# Patient Record
Sex: FEMALE | Race: BLACK OR AFRICAN AMERICAN | NOT HISPANIC OR LATINO | URBAN - METROPOLITAN AREA
[De-identification: names, ages, dates, MRNs, and addresses within clinical notes are randomized per-mention and may not be internally consistent; named-entity substitution may affect disease eponyms.]

---

## 2021-08-03 ENCOUNTER — EMERGENCY (EMERGENCY)
Facility: HOSPITAL | Age: 66
LOS: 1 days | Discharge: HOME | End: 2021-08-03
Attending: EMERGENCY MEDICINE
Payer: MEDICARE

## 2021-08-03 VITALS
DIASTOLIC BLOOD PRESSURE: 97 MMHG | HEART RATE: 73 BPM | OXYGEN SATURATION: 98 % | TEMPERATURE: 99 F | WEIGHT: 195.11 LBS | RESPIRATION RATE: 16 BRPM | SYSTOLIC BLOOD PRESSURE: 187 MMHG | HEIGHT: 67 IN

## 2021-08-03 DIAGNOSIS — Z98.89 OTHER SPECIFIED POSTPROCEDURAL STATES: Chronic | ICD-10-CM

## 2021-08-03 DIAGNOSIS — Z98.49 CATARACT EXTRACTION STATUS, UNSPECIFIED EYE: Chronic | ICD-10-CM

## 2021-08-03 DIAGNOSIS — Z99.2 DEPENDENCE ON RENAL DIALYSIS: Chronic | ICD-10-CM

## 2021-08-03 DIAGNOSIS — Z90.49 ACQUIRED ABSENCE OF OTHER SPECIFIED PARTS OF DIGESTIVE TRACT: Chronic | ICD-10-CM

## 2021-08-03 DIAGNOSIS — Z95.1 PRESENCE OF AORTOCORONARY BYPASS GRAFT: Chronic | ICD-10-CM

## 2021-08-03 DIAGNOSIS — Z95.5 PRESENCE OF CORONARY ANGIOPLASTY IMPLANT AND GRAFT: Chronic | ICD-10-CM

## 2021-08-03 PROCEDURE — 72125 CT NECK SPINE W/O DYE: CPT | Mod: 26,MH

## 2021-08-03 PROCEDURE — 70450 CT HEAD/BRAIN W/O DYE: CPT | Mod: 26,MH

## 2021-08-03 PROCEDURE — 73560 X-RAY EXAM OF KNEE 1 OR 2: CPT | Mod: 26,LT

## 2021-08-03 PROCEDURE — 70486 CT MAXILLOFACIAL W/O DYE: CPT | Mod: 26,MA

## 2021-08-03 PROCEDURE — 72125 CT NECK SPINE W/O DYE: CPT

## 2021-08-03 PROCEDURE — 73560 X-RAY EXAM OF KNEE 1 OR 2: CPT

## 2021-08-03 PROCEDURE — 12002 RPR S/N/AX/GEN/TRNK2.6-7.5CM: CPT

## 2021-08-03 PROCEDURE — 99284 EMERGENCY DEPT VISIT MOD MDM: CPT

## 2021-08-03 PROCEDURE — 13121 CMPLX RPR S/A/L 2.6-7.5 CM: CPT

## 2021-08-03 PROCEDURE — 12032 INTMD RPR S/A/T/EXT 2.6-7.5: CPT

## 2021-08-03 PROCEDURE — 99285 EMERGENCY DEPT VISIT HI MDM: CPT | Mod: 25

## 2021-08-03 PROCEDURE — 96374 THER/PROPH/DIAG INJ IV PUSH: CPT | Mod: XU

## 2021-08-03 PROCEDURE — 70450 CT HEAD/BRAIN W/O DYE: CPT

## 2021-08-03 PROCEDURE — 70486 CT MAXILLOFACIAL W/O DYE: CPT | Mod: MA

## 2021-08-03 PROCEDURE — 73562 X-RAY EXAM OF KNEE 3: CPT

## 2021-08-03 RX ORDER — HYDRALAZINE HCL 50 MG
1 TABLET ORAL
Qty: 0 | Refills: 0 | DISCHARGE

## 2021-08-03 RX ORDER — LABETALOL HCL 100 MG
1 TABLET ORAL
Qty: 0 | Refills: 0 | DISCHARGE

## 2021-08-03 RX ORDER — CLOPIDOGREL BISULFATE 75 MG/1
1 TABLET, FILM COATED ORAL
Qty: 0 | Refills: 0 | DISCHARGE

## 2021-08-03 RX ORDER — MONTELUKAST 4 MG/1
1 TABLET, CHEWABLE ORAL
Qty: 0 | Refills: 0 | DISCHARGE

## 2021-08-03 RX ORDER — SIMVASTATIN 20 MG/1
1 TABLET, FILM COATED ORAL
Qty: 0 | Refills: 0 | DISCHARGE

## 2021-08-03 RX ORDER — MORPHINE SULFATE 50 MG/1
2 CAPSULE, EXTENDED RELEASE ORAL ONCE
Refills: 0 | Status: DISCONTINUED | OUTPATIENT
Start: 2021-08-03 | End: 2021-08-03

## 2021-08-03 RX ORDER — ASPIRIN/CALCIUM CARB/MAGNESIUM 324 MG
1 TABLET ORAL
Qty: 0 | Refills: 0 | DISCHARGE

## 2021-08-03 RX ORDER — FOLIC ACID 0.8 MG
1 TABLET ORAL
Qty: 0 | Refills: 0 | DISCHARGE

## 2021-08-03 RX ADMIN — MORPHINE SULFATE 2 MILLIGRAM(S): 50 CAPSULE, EXTENDED RELEASE ORAL at 18:42

## 2021-08-03 NOTE — ED PROVIDER NOTE - CLINICAL SUMMARY MEDICAL DECISION MAKING FREE TEXT BOX
h/o esrd on plavix and aspirn with head/nasal injury; ct of head, maxillofacial , xray of left knee and pain meds

## 2021-08-03 NOTE — ED ADULT NURSE NOTE - OBJECTIVE STATEMENT
pt was done paying light bill and fell walking back to car on the sidewalk 'it was an uneven sidewalk'

## 2021-08-03 NOTE — ED PROVIDER NOTE - NSFOLLOWUPINSTRUCTIONS_ED_ALL_ED_FT
tylenol for pain  neosporin ointment 3 times a day  for 7 days  suture removal in 8 - 10 days  head injury instructions

## 2021-08-03 NOTE — ED ADULT NURSE NOTE - PMH
Asthma    CAD (coronary artery disease)    CKD (chronic kidney disease) requiring chronic dialysis    CVA (cerebral vascular accident)  2008  Diabetic retinopathy associated with diabetes mellitus due to underlying condition    HLD (hyperlipidemia)    Hypertension    Type 2 diabetes mellitus

## 2021-08-03 NOTE — ED ADULT TRIAGE NOTE - CHIEF COMPLAINT QUOTE
Pt A&Ox4 states "I fell on the side walk and hit my face and knees."  BIBA c/o fall having deep abrasion to left knee and small lac to bridge of nose. Patient denies LOC, no nausea no vomiting, denies syncopal episode, pt take asa 81 and Plavix.

## 2021-08-03 NOTE — ED ADULT NURSE NOTE - PSH
Acute hemodialysis patient  nothing in the left arm awaiting AV fistula placement - temporary cath in right upper chest quadrant  S/P CABG (coronary artery bypass graft)    S/P cataract extraction  OS with retained lens fragment 7/15/15  S/P  section    S/P cholecystectomy    S/P coronary artery stent placement  (1)

## 2021-08-03 NOTE — ED PROVIDER NOTE - PATIENT PORTAL LINK FT
You can access the FollowMyHealth Patient Portal offered by [facility] by registering at the following website: [email or web address]. By joining [employer]’s FollowMyHealth portal, you will also be able to view your health information using other applications (apps) compatible with our system.

## 2021-08-03 NOTE — ED PROVIDER NOTE - PHYSICAL EXAMINATION
Alert, lucid, and in no apparent distress. Pt is normocephalic, atraumatic.  Pupils are equal, round , nose 1cm laceration with bleeding lips pink, moist mucous membranes, tongue midline. Neck supple.   Lungs clear to auscultation. Heart regular rate and rhythm, normal S1, S2,   Abdomen is soft, nontender, no pulsatile mass, no masses,  Non-focal sensory, 5 out of 5 motor strength, left knee   deep abrasion with bleeding;   from of kneeno dysmetria, fluent, goal directed speech. CN2 to 12 intact. Skin without rash, purpura, eccyhmosis  No submandibular adenopathy. Normal mentation, does not appear agitated

## 2021-08-03 NOTE — ED PROVIDER NOTE - CARE PLAN
Principal Discharge DX:	Injury of nose, initial encounter  Secondary Diagnosis:	Abrasion of left knee, initial encounter

## 2022-09-20 ENCOUNTER — NON-APPOINTMENT (OUTPATIENT)
Age: 67
End: 2022-09-20

## 2022-09-20 ENCOUNTER — APPOINTMENT (OUTPATIENT)
Dept: TRANSPLANT | Facility: CLINIC | Age: 67
End: 2022-09-20

## 2022-09-20 ENCOUNTER — APPOINTMENT (OUTPATIENT)
Dept: NEPHROLOGY | Facility: CLINIC | Age: 67
End: 2022-09-20

## 2022-09-20 VITALS
SYSTOLIC BLOOD PRESSURE: 119 MMHG | RESPIRATION RATE: 16 BRPM | WEIGHT: 185 LBS | DIASTOLIC BLOOD PRESSURE: 59 MMHG | TEMPERATURE: 97.6 F | HEIGHT: 67 IN | BODY MASS INDEX: 29.03 KG/M2

## 2022-09-20 PROCEDURE — 99072 ADDL SUPL MATRL&STAF TM PHE: CPT

## 2022-09-20 PROCEDURE — 99205 OFFICE O/P NEW HI 60 MIN: CPT

## 2022-09-20 PROCEDURE — 99204 OFFICE O/P NEW MOD 45 MIN: CPT

## 2022-09-20 NOTE — PHYSICAL EXAM
[Well Developed] : well developed [No Acute Distress] : no acute distress [Scars] : scars [In Left Arm] : fistula/graft in left arm [] : right dorsalis pedis non-palpable [Normal] : normal

## 2022-09-20 NOTE — REVIEW OF SYSTEMS
[Recent Weight Loss (___ Lbs)] : recent [unfilled] ~Ulb weight loss [Can Walk (___ Blocks)] : can walk [unfilled] blocks [SOB] : shortness of breath [Urine Output: ____] : Urine Output: [unfilled] [Negative] : Neurological

## 2022-09-20 NOTE — ASSESSMENT
[FreeTextEntry1] : marginal candidate:\par  dialysis for 7 years\par  long term diabetes\par history of CVA\par CAD post CABG and multiple stenting\par however she has a reasonable functional status\par will proceed with transplant workup\par
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
## 2022-09-21 NOTE — PLAN
[FreeTextEntry1] : 1. ESRD on dialysis 2015 - Ms  EULOGIO NIELSEN  is a marginal candidate for kidney transplantation. \par diabetes for over 35 years, CAD s/p CABG and multiple PCI  most recently in 2021, CVA in 2008 with some residual weakness, functional status is fair) \par 2. Cardiac risk: Obtain echo, stress test and cardiac evaluation \par 3. Cancer screening: colonoscopy results,  Mammo, Pap \par 4. ID: Serology for acute and chronic viral infections. Screening for latent TB\par 5. Imaging: Renal/abdominal /chest /Iliac imaging\par 6. Diabetes Mellitus: check HbA1c \par  \par \par I have personally discussed the risks and benefits of transplantation and patient attended transplant education class where the following was disclosed:\par Reviewed factors affecting survival and morbidity while on dialysis, the transplant wait list and reviewed mandie-operative and long-term risk factors affecting outcome in kidney transplantation. \par One year SRTR outcomes for national and [facility] were discussed in regards to patient survival and graft survival after transplantation. \par Details of transplant surgery, including complications were discussed.\par Immunosuppression and complications including infection including life threatening sepsis and opportunistic infections, malignancy and new onset diabetes were discussed. \par Benefits of live donor transplantation as well as variability in wait times across regions and multiple listing were discussed. \par KDPI >85% and PHS high risk criteria donors were discussed. \par HCV kidney transplantation was discussed.\par Will proceed with completing/ updating work up and listing for transplant/ live donor transplant once work up is reviewed and found to be acceptable by multidisciplinary listing committee.\par
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
## 2022-09-22 ENCOUNTER — NON-APPOINTMENT (OUTPATIENT)
Age: 67
End: 2022-09-22

## 2022-09-28 LAB
ABO + RH PNL BLD: NORMAL
ABO + RH PNL BLD: NORMAL
ALBUMIN SERPL ELPH-MCNC: 4.2 G/DL
ALP BLD-CCNC: 100 U/L
ALT SERPL-CCNC: 10 U/L
ANION GAP SERPL CALC-SCNC: 20 MMOL/L
AST SERPL-CCNC: 14 U/L
BASOPHILS # BLD AUTO: 0.05 K/UL
BASOPHILS NFR BLD AUTO: 0.6 %
BILIRUB SERPL-MCNC: 0.2 MG/DL
BUN SERPL-MCNC: 70 MG/DL
C PEPTIDE SERPL-MCNC: 9.2 NG/ML
CALCIUM SERPL-MCNC: 9.9 MG/DL
CHLORIDE SERPL-SCNC: 96 MMOL/L
CHOLEST SERPL-MCNC: 225 MG/DL
CMV IGG SERPL QL: >10 U/ML
CMV IGG SERPL-IMP: POSITIVE
CO2 SERPL-SCNC: 25 MMOL/L
COVID-19 SPIKE DOMAIN ANTIBODY INTERPRETATION: POSITIVE
CREAT SERPL-MCNC: 8.46 MG/DL
EBV DNA SERPL NAA+PROBE-ACNC: NOT DETECTED IU/ML
EBV EA AB SER IA-ACNC: 36.4 U/ML
EBV EA AB TITR SER IF: POSITIVE
EBV EA IGG SER QL IA: 133 U/ML
EBV EA IGG SER-ACNC: POSITIVE
EBV EA IGM SER IA-ACNC: NEGATIVE
EBV PATRN SPEC IB-IMP: NORMAL
EBV VCA IGG SER IA-ACNC: >750 U/ML
EBV VCA IGM SER QL IA: <10 U/ML
EBVPCR LOG: NOT DETECTED LOG10IU/ML
EGFR: 5 ML/MIN/1.73M2
EOSINOPHIL # BLD AUTO: 0.19 K/UL
EOSINOPHIL NFR BLD AUTO: 2.4 %
EPSTEIN-BARR VIRUS CAPSID ANTIGEN IGG: POSITIVE
ESTIMATED AVERAGE GLUCOSE: 157 MG/DL
GLUCOSE SERPL-MCNC: 110 MG/DL
HAV IGM SER QL: NONREACTIVE
HBA1C MFR BLD HPLC: 7.1 %
HBV CORE IGG+IGM SER QL: NONREACTIVE
HBV SURFACE AB SER QL: REACTIVE
HBV SURFACE AB SERPL IA-ACNC: 39.8 MIU/ML
HBV SURFACE AG SER QL: NONREACTIVE
HCT VFR BLD CALC: 41.7 %
HCV AB SER QL: NONREACTIVE
HCV S/CO RATIO: 0.12 S/CO
HDLC SERPL-MCNC: 49 MG/DL
HGB BLD-MCNC: 12.4 G/DL
HIV1+2 AB SPEC QL IA.RAPID: NONREACTIVE
HSV 1+2 IGG SER IA-IMP: NEGATIVE
HSV 1+2 IGG SER IA-IMP: POSITIVE
HSV1 IGG SER QL: 43.9 INDEX
HSV2 IGG SER QL: 0.06 INDEX
IMM GRANULOCYTES NFR BLD AUTO: 0.4 %
LDLC SERPL CALC-MCNC: 130 MG/DL
LYMPHOCYTES # BLD AUTO: 1.97 K/UL
LYMPHOCYTES NFR BLD AUTO: 24.9 %
M TB IFN-G BLD-IMP: NEGATIVE
MAGNESIUM SERPL-MCNC: 2.4 MG/DL
MAN DIFF?: NORMAL
MCHC RBC-ENTMCNC: 28.4 PG
MCHC RBC-ENTMCNC: 29.7 GM/DL
MCV RBC AUTO: 95.4 FL
MONOCYTES # BLD AUTO: 0.53 K/UL
MONOCYTES NFR BLD AUTO: 6.7 %
NEUTROPHILS # BLD AUTO: 5.13 K/UL
NEUTROPHILS NFR BLD AUTO: 65 %
NONHDLC SERPL-MCNC: 177 MG/DL
PHOSPHATE SERPL-MCNC: 6.6 MG/DL
PLATELET # BLD AUTO: 365 K/UL
POTASSIUM SERPL-SCNC: 5.1 MMOL/L
PROT SERPL-MCNC: 7.4 G/DL
PSA SERPL-MCNC: <0.01 NG/ML
QUANTIFERON TB PLUS MITOGEN MINUS NIL: 2.82 IU/ML
QUANTIFERON TB PLUS NIL: 0.02 IU/ML
QUANTIFERON TB PLUS TB1 MINUS NIL: 0 IU/ML
QUANTIFERON TB PLUS TB2 MINUS NIL: 0 IU/ML
RBC # BLD: 4.37 M/UL
RBC # FLD: 13.2 %
RUBV IGG FLD-ACNC: 23.9 INDEX
RUBV IGG SER-IMP: POSITIVE
SARS-COV-2 AB SERPL IA-ACNC: >250 U/ML
SODIUM SERPL-SCNC: 141 MMOL/L
T GONDII AB SER-IMP: NEGATIVE
T GONDII IGG SER QL: <3 IU/ML
T PALLIDUM AB SER QL IA: NEGATIVE
TRIGL SERPL-MCNC: 235 MG/DL
URATE SERPL-MCNC: 6.5 MG/DL
VZV AB TITR SER: POSITIVE
VZV IGG SER IF-ACNC: 1789 INDEX
WBC # FLD AUTO: 7.9 K/UL

## 2022-11-10 ENCOUNTER — APPOINTMENT (OUTPATIENT)
Dept: CARDIOLOGY | Facility: CLINIC | Age: 67
End: 2022-11-10

## 2022-11-17 ENCOUNTER — APPOINTMENT (OUTPATIENT)
Dept: CARDIOLOGY | Facility: CLINIC | Age: 67
End: 2022-11-17

## 2022-11-17 ENCOUNTER — NON-APPOINTMENT (OUTPATIENT)
Age: 67
End: 2022-11-17

## 2022-11-17 VITALS
WEIGHT: 185 LBS | HEIGHT: 67 IN | HEART RATE: 67 BPM | SYSTOLIC BLOOD PRESSURE: 90 MMHG | DIASTOLIC BLOOD PRESSURE: 58 MMHG | BODY MASS INDEX: 29.03 KG/M2

## 2022-11-17 DIAGNOSIS — Z99.2 DEPENDENCE ON RENAL DIALYSIS: ICD-10-CM

## 2022-11-17 DIAGNOSIS — E11.9 TYPE 2 DIABETES MELLITUS W/OUT COMPLICATIONS: ICD-10-CM

## 2022-11-17 DIAGNOSIS — I10 ESSENTIAL (PRIMARY) HYPERTENSION: ICD-10-CM

## 2022-11-17 PROCEDURE — 93000 ELECTROCARDIOGRAM COMPLETE: CPT | Mod: NC

## 2022-11-17 PROCEDURE — 99205 OFFICE O/P NEW HI 60 MIN: CPT

## 2022-11-17 PROCEDURE — 99072 ADDL SUPL MATRL&STAF TM PHE: CPT

## 2022-11-22 NOTE — CARDIOLOGY SUMMARY
[de-identified] : 11/17/2022. Normal sinus rhythm with first degree AV delay, Jose = 220 msec. Normal axis. Fractionated QRS in inferior leads. RSR' in V1- nondiagnostic. Diffuse nonspecific T-abnormality. Poor R-wave progression.  [de-identified] : 8/2/2022. TTE. LVEF 55-60%. \par Moderate left ventricular hypertrophy. \par Hypokinesis of the basal mid anteroseptal myocardium. Akinesis of mid inferolateral myocardium. \par Right ventricle is mildly dilated. Right ventricular systolic function is mildly reduced. \par Trace mitral valve regurgitation.\par No pericardial effusion. \par
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
## 2022-11-22 NOTE — DISCUSSION/SUMMARY
[EKG obtained to assist in diagnosis and management of assessed problem(s)] : EKG obtained to assist in diagnosis and management of assessed problem(s) [FreeTextEntry1] : She is a 67 year old who presents today for preoperative cardiovascular evaluation prior to possible kidney transplant with known cardiac risk factors as above.\par \par ECG and recent echocardiogram as above.\par  A nuclear stress test will evaluate for any evidence of exercise induced ischemia. She has been provided a script to have this performed with her primary cardiologist and has been asked to forward the results of her test to this office for review.\par \par For a patient s/p CABG with multiple cardiac stents, would recommend high intensity statin therapy in addition to her daily low dose aspirin. She will discuss this with her primary cardiologist. \par \par Follow up pending results.

## 2022-11-22 NOTE — END OF VISIT
[FreeTextEntry3] : I saw the patient with Geovanna Smith NP and I agree with the findings and plan as above.  [Time Spent: ___ minutes] : I have spent [unfilled] minutes of time on the encounter.
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
## 2023-01-09 ENCOUNTER — NON-APPOINTMENT (OUTPATIENT)
Age: 68
End: 2023-01-09

## 2023-02-14 DIAGNOSIS — Z01.818 ENCOUNTER FOR OTHER PREPROCEDURAL EXAMINATION: ICD-10-CM

## 2023-02-25 ENCOUNTER — OUTPATIENT (OUTPATIENT)
Dept: OUTPATIENT SERVICES | Facility: HOSPITAL | Age: 68
LOS: 1 days | End: 2023-02-25
Payer: COMMERCIAL

## 2023-02-25 ENCOUNTER — APPOINTMENT (OUTPATIENT)
Dept: CT IMAGING | Facility: CLINIC | Age: 68
End: 2023-02-25
Payer: COMMERCIAL

## 2023-02-25 DIAGNOSIS — Z98.89 OTHER SPECIFIED POSTPROCEDURAL STATES: Chronic | ICD-10-CM

## 2023-02-25 DIAGNOSIS — Z01.818 ENCOUNTER FOR OTHER PREPROCEDURAL EXAMINATION: ICD-10-CM

## 2023-02-25 DIAGNOSIS — Z99.2 DEPENDENCE ON RENAL DIALYSIS: Chronic | ICD-10-CM

## 2023-02-25 DIAGNOSIS — Z98.49 CATARACT EXTRACTION STATUS, UNSPECIFIED EYE: Chronic | ICD-10-CM

## 2023-02-25 DIAGNOSIS — Z95.5 PRESENCE OF CORONARY ANGIOPLASTY IMPLANT AND GRAFT: Chronic | ICD-10-CM

## 2023-02-25 DIAGNOSIS — Z95.1 PRESENCE OF AORTOCORONARY BYPASS GRAFT: Chronic | ICD-10-CM

## 2023-02-25 DIAGNOSIS — Z90.49 ACQUIRED ABSENCE OF OTHER SPECIFIED PARTS OF DIGESTIVE TRACT: Chronic | ICD-10-CM

## 2023-02-25 PROCEDURE — 75635 CT ANGIO ABDOMINAL ARTERIES: CPT

## 2023-02-25 PROCEDURE — 75635 CT ANGIO ABDOMINAL ARTERIES: CPT | Mod: 26

## 2023-02-25 PROCEDURE — 71250 CT THORAX DX C-: CPT | Mod: 26

## 2023-02-25 PROCEDURE — 71250 CT THORAX DX C-: CPT

## 2023-03-10 ENCOUNTER — NON-APPOINTMENT (OUTPATIENT)
Age: 68
End: 2023-03-10

## 2023-07-10 ENCOUNTER — EMERGENCY (EMERGENCY)
Facility: HOSPITAL | Age: 68
LOS: 1 days | Discharge: HOME | End: 2023-07-10
Attending: EMERGENCY MEDICINE
Payer: MEDICARE

## 2023-07-10 VITALS
TEMPERATURE: 98 F | RESPIRATION RATE: 18 BRPM | SYSTOLIC BLOOD PRESSURE: 91 MMHG | HEART RATE: 102 BPM | WEIGHT: 186.95 LBS | OXYGEN SATURATION: 99 % | DIASTOLIC BLOOD PRESSURE: 60 MMHG | HEIGHT: 67 IN

## 2023-07-10 VITALS
SYSTOLIC BLOOD PRESSURE: 148 MMHG | HEART RATE: 70 BPM | RESPIRATION RATE: 18 BRPM | DIASTOLIC BLOOD PRESSURE: 67 MMHG | OXYGEN SATURATION: 100 % | TEMPERATURE: 98 F

## 2023-07-10 DIAGNOSIS — Z95.1 PRESENCE OF AORTOCORONARY BYPASS GRAFT: Chronic | ICD-10-CM

## 2023-07-10 DIAGNOSIS — Z90.49 ACQUIRED ABSENCE OF OTHER SPECIFIED PARTS OF DIGESTIVE TRACT: Chronic | ICD-10-CM

## 2023-07-10 DIAGNOSIS — Z99.2 DEPENDENCE ON RENAL DIALYSIS: Chronic | ICD-10-CM

## 2023-07-10 DIAGNOSIS — Z98.89 OTHER SPECIFIED POSTPROCEDURAL STATES: Chronic | ICD-10-CM

## 2023-07-10 DIAGNOSIS — Z95.5 PRESENCE OF CORONARY ANGIOPLASTY IMPLANT AND GRAFT: Chronic | ICD-10-CM

## 2023-07-10 DIAGNOSIS — Z98.49 CATARACT EXTRACTION STATUS, UNSPECIFIED EYE: Chronic | ICD-10-CM

## 2023-07-10 LAB
ALBUMIN SERPL ELPH-MCNC: 3.6 G/DL — SIGNIFICANT CHANGE UP (ref 3.3–5.2)
ALP SERPL-CCNC: 93 U/L — SIGNIFICANT CHANGE UP (ref 40–120)
ALT FLD-CCNC: <5 U/L — SIGNIFICANT CHANGE UP
ANION GAP SERPL CALC-SCNC: 15 MMOL/L — SIGNIFICANT CHANGE UP (ref 5–17)
AST SERPL-CCNC: 21 U/L — SIGNIFICANT CHANGE UP
BASOPHILS # BLD AUTO: 0.08 K/UL — SIGNIFICANT CHANGE UP (ref 0–0.2)
BASOPHILS NFR BLD AUTO: 0.8 % — SIGNIFICANT CHANGE UP (ref 0–2)
BILIRUB SERPL-MCNC: 0.4 MG/DL — SIGNIFICANT CHANGE UP (ref 0.4–2)
BUN SERPL-MCNC: 21.8 MG/DL — HIGH (ref 8–20)
CALCIUM SERPL-MCNC: 9.4 MG/DL — SIGNIFICANT CHANGE UP (ref 8.4–10.5)
CHLORIDE SERPL-SCNC: 95 MMOL/L — LOW (ref 96–108)
CO2 SERPL-SCNC: 28 MMOL/L — SIGNIFICANT CHANGE UP (ref 22–29)
CREAT SERPL-MCNC: 4.72 MG/DL — HIGH (ref 0.5–1.3)
EGFR: 10 ML/MIN/1.73M2 — LOW
EOSINOPHIL # BLD AUTO: 0.72 K/UL — HIGH (ref 0–0.5)
EOSINOPHIL NFR BLD AUTO: 7.5 % — HIGH (ref 0–6)
GLUCOSE SERPL-MCNC: 73 MG/DL — SIGNIFICANT CHANGE UP (ref 70–99)
HCT VFR BLD CALC: 31.6 % — LOW (ref 34.5–45)
HGB BLD-MCNC: 9.7 G/DL — LOW (ref 11.5–15.5)
IMM GRANULOCYTES NFR BLD AUTO: 0.5 % — SIGNIFICANT CHANGE UP (ref 0–0.9)
LYMPHOCYTES # BLD AUTO: 1.65 K/UL — SIGNIFICANT CHANGE UP (ref 1–3.3)
LYMPHOCYTES # BLD AUTO: 17.2 % — SIGNIFICANT CHANGE UP (ref 13–44)
MAGNESIUM SERPL-MCNC: 2.1 MG/DL — SIGNIFICANT CHANGE UP (ref 1.6–2.6)
MCHC RBC-ENTMCNC: 28.8 PG — SIGNIFICANT CHANGE UP (ref 27–34)
MCHC RBC-ENTMCNC: 30.7 GM/DL — LOW (ref 32–36)
MCV RBC AUTO: 93.8 FL — SIGNIFICANT CHANGE UP (ref 80–100)
MONOCYTES # BLD AUTO: 0.82 K/UL — SIGNIFICANT CHANGE UP (ref 0–0.9)
MONOCYTES NFR BLD AUTO: 8.6 % — SIGNIFICANT CHANGE UP (ref 2–14)
NEUTROPHILS # BLD AUTO: 6.26 K/UL — SIGNIFICANT CHANGE UP (ref 1.8–7.4)
NEUTROPHILS NFR BLD AUTO: 65.4 % — SIGNIFICANT CHANGE UP (ref 43–77)
NT-PROBNP SERPL-SCNC: HIGH PG/ML (ref 0–300)
PLATELET # BLD AUTO: 421 K/UL — HIGH (ref 150–400)
POTASSIUM SERPL-MCNC: 4 MMOL/L — SIGNIFICANT CHANGE UP (ref 3.5–5.3)
POTASSIUM SERPL-SCNC: 4 MMOL/L — SIGNIFICANT CHANGE UP (ref 3.5–5.3)
PROT SERPL-MCNC: 7.3 G/DL — SIGNIFICANT CHANGE UP (ref 6.6–8.7)
RBC # BLD: 3.37 M/UL — LOW (ref 3.8–5.2)
RBC # FLD: 15.1 % — HIGH (ref 10.3–14.5)
SODIUM SERPL-SCNC: 138 MMOL/L — SIGNIFICANT CHANGE UP (ref 135–145)
TROPONIN T SERPL-MCNC: 0.09 NG/ML — HIGH (ref 0–0.06)
TROPONIN T SERPL-MCNC: 0.1 NG/ML — HIGH (ref 0–0.06)
WBC # BLD: 9.58 K/UL — SIGNIFICANT CHANGE UP (ref 3.8–10.5)
WBC # FLD AUTO: 9.58 K/UL — SIGNIFICANT CHANGE UP (ref 3.8–10.5)

## 2023-07-10 PROCEDURE — 99285 EMERGENCY DEPT VISIT HI MDM: CPT | Mod: 25

## 2023-07-10 PROCEDURE — 71045 X-RAY EXAM CHEST 1 VIEW: CPT | Mod: 26

## 2023-07-10 PROCEDURE — 36415 COLL VENOUS BLD VENIPUNCTURE: CPT

## 2023-07-10 PROCEDURE — 99285 EMERGENCY DEPT VISIT HI MDM: CPT | Mod: GC

## 2023-07-10 PROCEDURE — 83735 ASSAY OF MAGNESIUM: CPT

## 2023-07-10 PROCEDURE — 94640 AIRWAY INHALATION TREATMENT: CPT

## 2023-07-10 PROCEDURE — 71045 X-RAY EXAM CHEST 1 VIEW: CPT

## 2023-07-10 PROCEDURE — 83880 ASSAY OF NATRIURETIC PEPTIDE: CPT

## 2023-07-10 PROCEDURE — 93010 ELECTROCARDIOGRAM REPORT: CPT

## 2023-07-10 PROCEDURE — 84484 ASSAY OF TROPONIN QUANT: CPT

## 2023-07-10 PROCEDURE — 85025 COMPLETE CBC W/AUTO DIFF WBC: CPT

## 2023-07-10 PROCEDURE — 93005 ELECTROCARDIOGRAM TRACING: CPT

## 2023-07-10 PROCEDURE — 80053 COMPREHEN METABOLIC PANEL: CPT

## 2023-07-10 RX ORDER — IPRATROPIUM/ALBUTEROL SULFATE 18-103MCG
3 AEROSOL WITH ADAPTER (GRAM) INHALATION ONCE
Refills: 0 | Status: COMPLETED | OUTPATIENT
Start: 2023-07-10 | End: 2023-07-10

## 2023-07-10 RX ADMIN — Medication 3 MILLILITER(S): at 15:01

## 2023-07-10 NOTE — ED PROVIDER NOTE - OBJECTIVE STATEMENT
Pt's guardian called to request a refill of      methylphenidate (CONCERTA) 27 MG CR tablet    Pharmacy: [address]   PH: [phone]  FAX: [phone]   Patient is a 68yo F with PMHx HTN, DMII, CAD s/p triple bypass, anemia, COPD, ESRD on dialysis MWF who presents to the ED complaining of shortness of breath after dialysis.  Patient states her oxygen was low before dialysis, the doctor put her on oxygen for dialysis, but she did not feel short of breath until after dialysis.  No associated chest pain, wheezing, headache, cough, fever.  Had some dizziness that she feels after dialysis normally, but improved after her blood pressure improved.  Still has a little bit of shortness of breath, but improved.  +RLE swelling after knee replacement recently (6/27), had a negative DVT study at [facility] today.     Cardiologist: Dr. Lyle, [place]

## 2023-07-10 NOTE — ED ADULT NURSE NOTE - NSFALLHARMRISKINTERV_ED_ALL_ED

## 2023-07-10 NOTE — ED PROVIDER NOTE - PATIENT PORTAL LINK FT
You can access the FollowMyHealth Patient Portal offered by [facility] by registering at the following website: [email or web address]. By joining [employer]’s FollowMyHealth portal, you will also be able to view your health information using other applications (apps) compatible with our system.

## 2023-07-10 NOTE — ED PROVIDER NOTE - NS ED ROS FT
General: No fever, chills.  Respiratory: + SOB  Cardiac: No chest pain  GI: No abdominal pain, nausea, vomiting  Neuro: No headache  MSK: No muscle pain. + chronic joint pain, back pain.  Psych: No known mental health issues.  Endocrine: No heat/cold intolerance, no polyuria/polydipsia.  Heme: No easy bruising or bleeding.  Allergic: No pruritis, dermatitis, or environmental allergies.

## 2023-07-10 NOTE — ED PROVIDER NOTE - NSICDXPASTMEDICALHX_GEN_ALL_CORE_FT
PAST MEDICAL HISTORY:  Asthma     CAD (coronary artery disease)     CKD (chronic kidney disease) requiring chronic dialysis     CVA (cerebral vascular accident) 2008    Diabetic retinopathy associated with diabetes mellitus due to underlying condition     HLD (hyperlipidemia)     Hypertension     Type 2 diabetes mellitus

## 2023-07-10 NOTE — ED PROVIDER NOTE - CLINICAL SUMMARY MEDICAL DECISION MAKING FREE TEXT BOX
Patient is a 68yo F with PMHx HTN, DMII, CAD s/p triple bypass, anemia, COPD, ESRD on dialysis MWF who presents to the ED complaining of shortness of breath after dialysis. Likely post dialysis side effects, symptoms improving with BP. Will do cardiac work up and trial duo-nebs.

## 2023-07-10 NOTE — ED PROVIDER NOTE - ATTENDING CONTRIBUTION TO CARE
Gaby: I performed a face to face evaluation of this patient and performed a full history and physical examination on the patient.  I agree with the resident's history, physical examination, and plan of the patient unless otherwise noted. My brief assessment is as follows: pmh as documented c/o sob after completing dialysis today. states had some dizziness/low bp as well. denies any pain. by my exam states feels completely better no sob. recent knee replacement end of june, had neg dvt study outside hospital today. no hx dvt/pe, on asa/plavix. non toxic, well appearing, nad, nl resp effort, clear lungs, nl rate, satting 99% on room air, rrr, abd benign, neuro intact. mild swelling LE, right knee c/d/i healing site Gaby: I performed a face to face evaluation of this patient and performed a full history and physical examination on the patient.  I agree with the resident's history, physical examination, and plan of the patient unless otherwise noted. My brief assessment is as follows: pmh as documented c/o sob after completing dialysis today. states had some dizziness/low bp as well. denies any pain. by my exam states feels completely better no sob. recent knee replacement end of june, had neg dvt study outside hospital today. no hx dvt/pe, on asa/plavix. non toxic, well appearing, nad, nl resp effort, clear lungs, nl rate, satting 99% on room air, rrr, abd benign, neuro intact. mild swelling LE, right knee c/d/i healing site. ekg non ischemic, will check cxr, labs, lower suspicion for pe given just neg dvt study today and symptoms only associated with brief period after dialysis with low bp and resolved shortly after.

## 2023-07-10 NOTE — ED PROVIDER NOTE - PROGRESS NOTE DETAILS
Gaby: pt has felt well entire time here, no sob. satting 100% entire time on RA. trops likely from ckd as peaked. return prefcautions. pt would like to go home.

## 2023-07-10 NOTE — ED PROVIDER NOTE - NSICDXPASTSURGICALHX_GEN_ALL_CORE_FT
PAST SURGICAL HISTORY:  Acute hemodialysis patient nothing in the left arm awaiting AV fistula placement - temporary cath in right upper chest quadrant    S/P CABG (coronary artery bypass graft)     S/P cataract extraction OS with retained lens fragment 7/15/15    S/P  section     S/P cholecystectomy     S/P coronary artery stent placement (1)

## 2023-07-10 NOTE — ED PROVIDER NOTE - PHYSICAL EXAMINATION
Gen: AAOx3, NAD, well nourished  HEENT: Normocephalic atraumatic. EOMI. No scleral icterus. Moist mucus membranes.  CV: RRR. Audible S1 and S2. No murmurs. 2+ radial and PT pulses.   Pulm: Clear to auscultation bilaterally. No wheezes, rales, or rhonchi. No accessory muscle use or respiratory distress.  Abdomen: soft, normoactive BS, non distended, nontender, no rebound, no guarding  Musculoskeletal:  Moving all extremities equally. +RLE with swelling, no tenderness. Knee incision site clean and well healing.   Neurologic: No focal neurological deficits. CN II-XII grossly intact.  Psych: Appropriate mood and affect. Cooperative.

## 2023-07-10 NOTE — ED ADULT NURSE NOTE - OBJECTIVE STATEMENT
pt care assumed @1330 in zone pt a&ox45 able to make needs known VSS on RA breathing regular and unlabored. Pt reports feeling sob after dialysis today denies chest pain or other pain. Currently able to speak in full sentences, lungs cta heart sounds normal +bs x4. Spo2 is 99% on RA. edema noted bilat +2 pt care assumed @1330 in zone pt a&ox45 able to make needs known VSS on RA breathing regular and unlabored. Pt reports feeling sob after dialysis today denies chest pain or other pain. Currently able to speak in full sentences, lungs cta heart sounds normal +bs x4. Spo2 is 99% on RA. edema noted bilat +2. cardiac monitor applied pt care assumed @1330 in zone pt a&ox4 able to make needs known VSS on RA breathing regular and unlabored. Pt reports feeling sob after dialysis today (MWF) denies chest pain or other pain. Currently able to speak in full sentences, lungs cta heart sounds normal +bs x4. Spo2 is 99% on RA. edema noted bilat +2. cardiac monitor applied

## 2023-07-17 ENCOUNTER — EMERGENCY (EMERGENCY)
Facility: HOSPITAL | Age: 68
LOS: 1 days | Discharge: HOME | End: 2023-07-17
Attending: EMERGENCY MEDICINE
Payer: MEDICARE

## 2023-07-17 VITALS
RESPIRATION RATE: 18 BRPM | TEMPERATURE: 98 F | OXYGEN SATURATION: 98 % | HEART RATE: 65 BPM | DIASTOLIC BLOOD PRESSURE: 85 MMHG | SYSTOLIC BLOOD PRESSURE: 140 MMHG | HEIGHT: 67 IN | WEIGHT: 186.95 LBS

## 2023-07-17 DIAGNOSIS — Z95.1 PRESENCE OF AORTOCORONARY BYPASS GRAFT: Chronic | ICD-10-CM

## 2023-07-17 DIAGNOSIS — Z99.2 DEPENDENCE ON RENAL DIALYSIS: Chronic | ICD-10-CM

## 2023-07-17 DIAGNOSIS — Z90.49 ACQUIRED ABSENCE OF OTHER SPECIFIED PARTS OF DIGESTIVE TRACT: Chronic | ICD-10-CM

## 2023-07-17 DIAGNOSIS — Z98.89 OTHER SPECIFIED POSTPROCEDURAL STATES: Chronic | ICD-10-CM

## 2023-07-17 DIAGNOSIS — Z95.5 PRESENCE OF CORONARY ANGIOPLASTY IMPLANT AND GRAFT: Chronic | ICD-10-CM

## 2023-07-17 DIAGNOSIS — Z98.49 CATARACT EXTRACTION STATUS, UNSPECIFIED EYE: Chronic | ICD-10-CM

## 2023-07-17 PROCEDURE — 99284 EMERGENCY DEPT VISIT MOD MDM: CPT | Mod: 25

## 2023-07-17 PROCEDURE — 99285 EMERGENCY DEPT VISIT HI MDM: CPT

## 2023-07-17 PROCEDURE — 93971 EXTREMITY STUDY: CPT | Mod: 26,RT

## 2023-07-17 PROCEDURE — 73562 X-RAY EXAM OF KNEE 3: CPT | Mod: 26,RT

## 2023-07-17 PROCEDURE — 93971 EXTREMITY STUDY: CPT

## 2023-07-17 PROCEDURE — 73562 X-RAY EXAM OF KNEE 3: CPT

## 2023-07-17 RX ORDER — MORPHINE SULFATE 50 MG/1
2 CAPSULE, EXTENDED RELEASE ORAL ONCE
Refills: 0 | Status: DISCONTINUED | OUTPATIENT
Start: 2023-07-17 | End: 2023-07-17

## 2023-07-17 RX ADMIN — MORPHINE SULFATE 2 MILLIGRAM(S): 50 CAPSULE, EXTENDED RELEASE ORAL at 13:20

## 2023-07-17 NOTE — ED STATDOCS - CLINICAL SUMMARY MEDICAL DECISION MAKING FREE TEXT BOX
pt s/p TKR on 6/27 with leg swelling and calf tenderness on exam, will obtain US to r/o DVT, x-ray to evaluate prosthesis pt s/p TKR on 6/27 with leg swelling and calf tenderness on exam, will obtain US to r/o DVT, x-ray to evaluate prosthesis    no evidence of DVT on US, indicated hematoma and effusion. meds given for pain.

## 2023-07-17 NOTE — ED STATDOCS - PATIENT PORTAL LINK FT
You can access the FollowMyHealth Patient Portal offered by [facility] by registering at the following website: [email or web address]. By joining [employer]’s FollowMyHealth portal, you will also be able to view your health information using other applications (apps) compatible with our system.

## 2023-07-17 NOTE — ED ADULT NURSE NOTE - NSFALLHARMRISKINTERV_ED_ALL_ED

## 2023-07-17 NOTE — ED STATDOCS - MUSCULOSKELETAL, MLM
2 + pitting edema right lower extremity, right calf tenderness, right knee effusion with warmth over anterior aspect of knee, decreased ROM, no erythema

## 2023-07-17 NOTE — ED STATDOCS - CARE PROVIDER_API CALL
Barak Lanier  Orthopaedic Surgery  [address] [place] [id number]  Phone: [phone]  Fax: [phone]  Follow Up Time:   
detailed exam

## 2023-07-17 NOTE — ED STATDOCS - NS_ ATTENDINGSCRIBEDETAILS _ED_A_ED_FT
I, William Smith, performed the initial face to face bedside interview with this patient regarding history of present illness, review of symptoms and relevant past medical, social and family history.  I completed an independent physical examination.  I was the provider who initially evaluated this patient.  The history, relevant review of systems, past medical and surgical history, medical decision making, and physical examination was documented by the scribe in my presence and I attest to the accuracy of the documentation. Follow-up on ordered tests (ie labs, radiologic studies) and re-evaluation of the patient's status has been communicated to the ACP.  Disposition of the patient will be based on test outcome and response to ED interventions.

## 2023-07-17 NOTE — ED STATDOCS - OBJECTIVE STATEMENT
66 y/o female with PMHx of HTN, DM, CAD s/p 3 vessel CABG on aspirin and plavix, COPD, CVAx2, cholecystectomy ESRD on dialysis, total knee replacement at [place] with Dr. Faizan Flores on 6/27 presents to the ED c/o right lower extremity pain and swelling, states she is unable to walk secondary to pain. Pt took 2 5mg oxycodone at 03:30. Pt did not attend her dialysis today 66 y/o female with PMHx of HTN, DM, CAD s/p 3 vessel CABG on aspirin and plavix, COPD, CVAx2, cholecystectomy ESRD on dialysis, total knee replacement at [place] with Dr. Faizan Flores on 6/27 presents to the ED c/o right lower extremity pain and swelling since yesterday. Denies fever. States she is unable to walk secondary to pain. Denies known trauma or injury.  Pt took 2 5mg oxycodone at 03:30. No prior problems with knee since surgery.  Called orthopedic surgeon and was advised to go to ER for evaluation.  Pt did not attend her dialysis today.  Takes plavix and baby aspirin

## 2023-07-17 NOTE — ED ADULT NURSE NOTE - OBJECTIVE STATEMENT
PMHx of HTN, DM, CAD s/p 3 vessel CABG on aspirin and plavix, COPD, CVAx2, cholecystectomy ESRD on dialysis, total knee replacement at [place] with Dr. Faizan Flores on 6/27 presents to the ED c/o right lower extremity pain and swelling, states she is unable to walk secondary to pain. Pt took 2 5mg oxycodone at 03:30. Pt did not attend her dialysis today

## 2023-07-22 ENCOUNTER — INPATIENT (INPATIENT)
Facility: HOSPITAL | Age: 68
LOS: 3 days | Discharge: NURSING, REHAB OR LONG-TERM CARE | DRG: 562 | End: 2023-07-26
Attending: HOSPITALIST | Admitting: INTERNAL MEDICINE
Payer: MEDICARE

## 2023-07-22 VITALS
SYSTOLIC BLOOD PRESSURE: 92 MMHG | WEIGHT: 186.95 LBS | DIASTOLIC BLOOD PRESSURE: 52 MMHG | TEMPERATURE: 98 F | RESPIRATION RATE: 18 BRPM | HEART RATE: 114 BPM | HEIGHT: 67 IN | OXYGEN SATURATION: 98 %

## 2023-07-22 DIAGNOSIS — Z99.2 DEPENDENCE ON RENAL DIALYSIS: Chronic | ICD-10-CM

## 2023-07-22 DIAGNOSIS — Z98.49 CATARACT EXTRACTION STATUS, UNSPECIFIED EYE: Chronic | ICD-10-CM

## 2023-07-22 DIAGNOSIS — Z95.5 PRESENCE OF CORONARY ANGIOPLASTY IMPLANT AND GRAFT: Chronic | ICD-10-CM

## 2023-07-22 DIAGNOSIS — Z98.89 OTHER SPECIFIED POSTPROCEDURAL STATES: Chronic | ICD-10-CM

## 2023-07-22 DIAGNOSIS — Z95.1 PRESENCE OF AORTOCORONARY BYPASS GRAFT: Chronic | ICD-10-CM

## 2023-07-22 DIAGNOSIS — Z90.49 ACQUIRED ABSENCE OF OTHER SPECIFIED PARTS OF DIGESTIVE TRACT: Chronic | ICD-10-CM

## 2023-07-22 LAB
ALBUMIN SERPL ELPH-MCNC: 3.3 G/DL — SIGNIFICANT CHANGE UP (ref 3.3–5.2)
ALP SERPL-CCNC: 124 U/L — HIGH (ref 40–120)
ALT FLD-CCNC: <5 U/L — SIGNIFICANT CHANGE UP
ANION GAP SERPL CALC-SCNC: 12 MMOL/L — SIGNIFICANT CHANGE UP (ref 5–17)
APTT BLD: 27.2 SEC — LOW (ref 27.5–35.5)
AST SERPL-CCNC: 17 U/L — SIGNIFICANT CHANGE UP
BASOPHILS # BLD AUTO: 0.05 K/UL — SIGNIFICANT CHANGE UP (ref 0–0.2)
BASOPHILS NFR BLD AUTO: 0.8 % — SIGNIFICANT CHANGE UP (ref 0–2)
BILIRUB SERPL-MCNC: 0.3 MG/DL — LOW (ref 0.4–2)
BLD GP AB SCN SERPL QL: SIGNIFICANT CHANGE UP
BUN SERPL-MCNC: 20.6 MG/DL — HIGH (ref 8–20)
CALCIUM SERPL-MCNC: 9.4 MG/DL — SIGNIFICANT CHANGE UP (ref 8.4–10.5)
CHLORIDE SERPL-SCNC: 96 MMOL/L — SIGNIFICANT CHANGE UP (ref 96–108)
CO2 SERPL-SCNC: 30 MMOL/L — HIGH (ref 22–29)
CREAT SERPL-MCNC: 5.6 MG/DL — HIGH (ref 0.5–1.3)
EGFR: 8 ML/MIN/1.73M2 — LOW
EOSINOPHIL # BLD AUTO: 0.71 K/UL — HIGH (ref 0–0.5)
EOSINOPHIL NFR BLD AUTO: 11.3 % — HIGH (ref 0–6)
GLUCOSE SERPL-MCNC: 94 MG/DL — SIGNIFICANT CHANGE UP (ref 70–99)
HCT VFR BLD CALC: 31 % — LOW (ref 34.5–45)
HGB BLD-MCNC: 9.2 G/DL — LOW (ref 11.5–15.5)
IMM GRANULOCYTES NFR BLD AUTO: 0.3 % — SIGNIFICANT CHANGE UP (ref 0–0.9)
INR BLD: 0.99 RATIO — SIGNIFICANT CHANGE UP (ref 0.88–1.16)
LYMPHOCYTES # BLD AUTO: 1.73 K/UL — SIGNIFICANT CHANGE UP (ref 1–3.3)
LYMPHOCYTES # BLD AUTO: 27.4 % — SIGNIFICANT CHANGE UP (ref 13–44)
MCHC RBC-ENTMCNC: 28.4 PG — SIGNIFICANT CHANGE UP (ref 27–34)
MCHC RBC-ENTMCNC: 29.7 GM/DL — LOW (ref 32–36)
MCV RBC AUTO: 95.7 FL — SIGNIFICANT CHANGE UP (ref 80–100)
MONOCYTES # BLD AUTO: 0.56 K/UL — SIGNIFICANT CHANGE UP (ref 0–0.9)
MONOCYTES NFR BLD AUTO: 8.9 % — SIGNIFICANT CHANGE UP (ref 2–14)
NEUTROPHILS # BLD AUTO: 3.24 K/UL — SIGNIFICANT CHANGE UP (ref 1.8–7.4)
NEUTROPHILS NFR BLD AUTO: 51.3 % — SIGNIFICANT CHANGE UP (ref 43–77)
PLATELET # BLD AUTO: 428 K/UL — HIGH (ref 150–400)
POTASSIUM SERPL-MCNC: 3.8 MMOL/L — SIGNIFICANT CHANGE UP (ref 3.5–5.3)
POTASSIUM SERPL-SCNC: 3.8 MMOL/L — SIGNIFICANT CHANGE UP (ref 3.5–5.3)
PROT SERPL-MCNC: 7.1 G/DL — SIGNIFICANT CHANGE UP (ref 6.6–8.7)
PROTHROM AB SERPL-ACNC: 11.5 SEC — SIGNIFICANT CHANGE UP (ref 10.5–13.4)
RBC # BLD: 3.24 M/UL — LOW (ref 3.8–5.2)
RBC # FLD: 15 % — HIGH (ref 10.3–14.5)
SODIUM SERPL-SCNC: 138 MMOL/L — SIGNIFICANT CHANGE UP (ref 135–145)
WBC # BLD: 6.31 K/UL — SIGNIFICANT CHANGE UP (ref 3.8–10.5)
WBC # FLD AUTO: 6.31 K/UL — SIGNIFICANT CHANGE UP (ref 3.8–10.5)

## 2023-07-22 PROCEDURE — 73564 X-RAY EXAM KNEE 4 OR MORE: CPT | Mod: 26,RT

## 2023-07-22 PROCEDURE — 99285 EMERGENCY DEPT VISIT HI MDM: CPT | Mod: GC

## 2023-07-22 RX ORDER — POLYETHYLENE GLYCOL 3350 17 G/17G
17 POWDER, FOR SOLUTION ORAL ONCE
Refills: 0 | Status: COMPLETED | OUTPATIENT
Start: 2023-07-22 | End: 2023-07-22

## 2023-07-22 RX ORDER — OXYCODONE HYDROCHLORIDE 5 MG/1
5 TABLET ORAL ONCE
Refills: 0 | Status: DISCONTINUED | OUTPATIENT
Start: 2023-07-22 | End: 2023-07-22

## 2023-07-22 RX ORDER — SENNA PLUS 8.6 MG/1
1 TABLET ORAL ONCE
Refills: 0 | Status: COMPLETED | OUTPATIENT
Start: 2023-07-22 | End: 2023-07-22

## 2023-07-22 RX ADMIN — OXYCODONE HYDROCHLORIDE 5 MILLIGRAM(S): 5 TABLET ORAL at 19:30

## 2023-07-22 RX ADMIN — POLYETHYLENE GLYCOL 3350 17 GRAM(S): 17 POWDER, FOR SOLUTION ORAL at 19:02

## 2023-07-22 RX ADMIN — SENNA PLUS 1 TABLET(S): 8.6 TABLET ORAL at 19:00

## 2023-07-22 RX ADMIN — OXYCODONE HYDROCHLORIDE 5 MILLIGRAM(S): 5 TABLET ORAL at 19:00

## 2023-07-22 NOTE — ED ADULT NURSE NOTE - NSFALLHARMRISKINTERV_ED_ALL_ED

## 2023-07-22 NOTE — ED ADULT NURSE NOTE - NSICDXPASTSURGICALHX_GEN_ALL_CORE_FT
Detail Level: Generalized
Detail Level: Detailed
PAST SURGICAL HISTORY:  Acute hemodialysis patient nothing in the left arm awaiting AV fistula placement - temporary cath in right upper chest quadrant    S/P CABG (coronary artery bypass graft)     S/P cataract extraction OS with retained lens fragment 7/15/15    S/P  section     S/P cholecystectomy     S/P coronary artery stent placement (1)

## 2023-07-22 NOTE — ED PROVIDER NOTE - PROGRESS NOTE DETAILS
Bo: lengthy d/w patient and primary orthopedic team; Dr. Simms; [facility] ; [facility] of [facility]; practice number [phone]; reviewed outpt notes [facility]; patient has known post operative periprosthetic frtacture of medial condyle of tibial plateau; plan is to maintain hinge brace and non weight bearing; patient will also need [facility] for rehab that can also accommodate dialysis, will plan to admit; d/w hospitalist

## 2023-07-22 NOTE — ED PROVIDER NOTE - OBJECTIVE STATEMENT
67yF with pmh ESRD (HD MWF), CAD s/p CABG, COPD (no home O2), HTN, DMII presenting with right knee pain. Patient reports that she had a total knee replacement at [place] with Dr. Faizan Flores on 6/27. She came to [facility] ED for right knee pain. U/S at the time did not show a DVT. Patient recently had xrays done by her surgeon for persistent knee pain and was found to have a fracture. She is unsure where the fracture is but was told that it would not need surgery to correct. She reports that despite taking oxycodone at home she has not been able to ambulate and has had difficulty taking care of herself. Patient is asking for rehab placement. She denies fevers, chills, chest pain, abd pain/N/V. Of note patient does endorses constipation since starting her pain medication regimen with the last BM occurring ~5 days ago.

## 2023-07-22 NOTE — ED PROVIDER NOTE - CLINICAL SUMMARY MEDICAL DECISION MAKING FREE TEXT BOX
67yF with pmh ESRD (HD MWF), CAD s/p CABG, COPD (no home O2), HTN, DMII presenting with right knee pain. Patient is overall well appearing and afebrile. Low suspicion for septic knee. Pain likely 2/2 fracture the patient developed after surgery. She is asking for rehab placement. She confirms that her last dialysis was Friday. Will check labs and xray of right knee

## 2023-07-22 NOTE — ED PROVIDER NOTE - ATTENDING CONTRIBUTION TO CARE
I performed a history and physical exam of the patient and discussed their management with the resident. I reviewed the resident's note and agree with the documented findings and plan of care. My medical decision making and observations are found below.     66yo F with PMH ESRD on HD, s/p R knee replacement approx 1 month ago at [facility] with R knee pain and inability to ambulate 2/2 pain. Patient requesting rehab. No fevers/chills/redness. Plan to obtain imaging R knee, likely will need admission for GENESIS arrangement and safe dispo planning. Dior Carlos DO

## 2023-07-22 NOTE — ED ADULT NURSE NOTE - OBJECTIVE STATEMENT
assumed care of pt at 1815. sent in by surgeon for eval. pt states" they said I have a fracture, my surgery was  6/27/23". c/o worsening right knee pain. denies numbness/tingling or falls. pt receives dialysis m,w,f, last dialysis Friday. pt also c/o no bowel movement for last 5 days. denies chest pain, sob or dizziness. denies abd pain, n, or vomiting. anox4. graft present In left upper extremity, pink band in place. pt educated on plan of care, pt able to successfully teach back plan of care to RN, RN will continue to reeducate pt during hospital stay.

## 2023-07-22 NOTE — ED PROVIDER NOTE - PHYSICAL EXAMINATION
Gen: no acute distress  Head: normocephalic, atraumatic  EENT: EOMI  Lung: no increased work of breathing, CTABL  CV: normal s1/s2,   Abd: soft, non-tender, non-distended  MSK: right knee without erythema; mildly swollen, no warmth; compartments are soft  Neuro: A&Ox4; No focal neurologic deficits

## 2023-07-23 DIAGNOSIS — M97.8XXA PERIPROSTHETIC FRACTURE AROUND OTHER INTERNAL PROSTHETIC JOINT, INITIAL ENCOUNTER: ICD-10-CM

## 2023-07-23 LAB
GLUCOSE BLDC GLUCOMTR-MCNC: 151 MG/DL — HIGH (ref 70–99)
GLUCOSE BLDC GLUCOMTR-MCNC: 158 MG/DL — HIGH (ref 70–99)
GLUCOSE BLDC GLUCOMTR-MCNC: 326 MG/DL — HIGH (ref 70–99)
GLUCOSE BLDC GLUCOMTR-MCNC: 90 MG/DL — SIGNIFICANT CHANGE UP (ref 70–99)

## 2023-07-23 PROCEDURE — 99222 1ST HOSP IP/OBS MODERATE 55: CPT

## 2023-07-23 PROCEDURE — 99223 1ST HOSP IP/OBS HIGH 75: CPT

## 2023-07-23 RX ORDER — MONTELUKAST 4 MG/1
10 TABLET, CHEWABLE ORAL DAILY
Refills: 0 | Status: DISCONTINUED | OUTPATIENT
Start: 2023-07-23 | End: 2023-07-26

## 2023-07-23 RX ORDER — SIMVASTATIN 20 MG/1
40 TABLET, FILM COATED ORAL AT BEDTIME
Refills: 0 | Status: DISCONTINUED | OUTPATIENT
Start: 2023-07-23 | End: 2023-07-26

## 2023-07-23 RX ORDER — CLOPIDOGREL BISULFATE 75 MG/1
75 TABLET, FILM COATED ORAL DAILY
Refills: 0 | Status: DISCONTINUED | OUTPATIENT
Start: 2023-07-23 | End: 2023-07-26

## 2023-07-23 RX ORDER — SENNA PLUS 8.6 MG/1
2 TABLET ORAL AT BEDTIME
Refills: 0 | Status: DISCONTINUED | OUTPATIENT
Start: 2023-07-23 | End: 2023-07-26

## 2023-07-23 RX ORDER — SODIUM CHLORIDE 9 MG/ML
1000 INJECTION, SOLUTION INTRAVENOUS
Refills: 0 | Status: DISCONTINUED | OUTPATIENT
Start: 2023-07-23 | End: 2023-07-26

## 2023-07-23 RX ORDER — LABETALOL HCL 100 MG
200 TABLET ORAL
Refills: 0 | Status: DISCONTINUED | OUTPATIENT
Start: 2023-07-23 | End: 2023-07-26

## 2023-07-23 RX ORDER — HYDRALAZINE HCL 50 MG
50 TABLET ORAL EVERY 8 HOURS
Refills: 0 | Status: DISCONTINUED | OUTPATIENT
Start: 2023-07-23 | End: 2023-07-26

## 2023-07-23 RX ORDER — ACETAMINOPHEN 500 MG
650 TABLET ORAL EVERY 6 HOURS
Refills: 0 | Status: DISCONTINUED | OUTPATIENT
Start: 2023-07-23 | End: 2023-07-26

## 2023-07-23 RX ORDER — INSULIN LISPRO 100/ML
VIAL (ML) SUBCUTANEOUS
Refills: 0 | Status: DISCONTINUED | OUTPATIENT
Start: 2023-07-23 | End: 2023-07-26

## 2023-07-23 RX ORDER — INSULIN LISPRO 100/ML
VIAL (ML) SUBCUTANEOUS AT BEDTIME
Refills: 0 | Status: DISCONTINUED | OUTPATIENT
Start: 2023-07-23 | End: 2023-07-26

## 2023-07-23 RX ORDER — HYDRALAZINE HCL 50 MG
10 TABLET ORAL EVERY 6 HOURS
Refills: 0 | Status: DISCONTINUED | OUTPATIENT
Start: 2023-07-23 | End: 2023-07-26

## 2023-07-23 RX ORDER — HYDROMORPHONE HYDROCHLORIDE 2 MG/ML
0.5 INJECTION INTRAMUSCULAR; INTRAVENOUS; SUBCUTANEOUS EVERY 6 HOURS
Refills: 0 | Status: DISCONTINUED | OUTPATIENT
Start: 2023-07-23 | End: 2023-07-24

## 2023-07-23 RX ORDER — DEXTROSE 50 % IN WATER 50 %
25 SYRINGE (ML) INTRAVENOUS ONCE
Refills: 0 | Status: DISCONTINUED | OUTPATIENT
Start: 2023-07-23 | End: 2023-07-26

## 2023-07-23 RX ORDER — DEXTROSE 50 % IN WATER 50 %
15 SYRINGE (ML) INTRAVENOUS ONCE
Refills: 0 | Status: DISCONTINUED | OUTPATIENT
Start: 2023-07-23 | End: 2023-07-26

## 2023-07-23 RX ORDER — HYDRALAZINE HCL 50 MG
10 TABLET ORAL ONCE
Refills: 0 | Status: COMPLETED | OUTPATIENT
Start: 2023-07-23 | End: 2023-07-23

## 2023-07-23 RX ORDER — OXYCODONE HYDROCHLORIDE 5 MG/1
10 TABLET ORAL EVERY 6 HOURS
Refills: 0 | Status: DISCONTINUED | OUTPATIENT
Start: 2023-07-23 | End: 2023-07-24

## 2023-07-23 RX ORDER — ASPIRIN/CALCIUM CARB/MAGNESIUM 324 MG
81 TABLET ORAL DAILY
Refills: 0 | Status: DISCONTINUED | OUTPATIENT
Start: 2023-07-23 | End: 2023-07-26

## 2023-07-23 RX ORDER — MAGNESIUM HYDROXIDE 400 MG/1
30 TABLET, CHEWABLE ORAL DAILY
Refills: 0 | Status: DISCONTINUED | OUTPATIENT
Start: 2023-07-23 | End: 2023-07-26

## 2023-07-23 RX ORDER — ALBUTEROL 90 UG/1
2 AEROSOL, METERED ORAL
Refills: 0 | DISCHARGE

## 2023-07-23 RX ORDER — HEPARIN SODIUM 5000 [USP'U]/ML
5000 INJECTION INTRAVENOUS; SUBCUTANEOUS EVERY 12 HOURS
Refills: 0 | Status: DISCONTINUED | OUTPATIENT
Start: 2023-07-23 | End: 2023-07-26

## 2023-07-23 RX ORDER — ERYTHROPOIETIN 10000 [IU]/ML
5000 INJECTION, SOLUTION INTRAVENOUS; SUBCUTANEOUS
Refills: 0 | Status: DISCONTINUED | OUTPATIENT
Start: 2023-07-24 | End: 2023-07-26

## 2023-07-23 RX ORDER — POLYETHYLENE GLYCOL 3350 17 G/17G
17 POWDER, FOR SOLUTION ORAL DAILY
Refills: 0 | Status: DISCONTINUED | OUTPATIENT
Start: 2023-07-23 | End: 2023-07-24

## 2023-07-23 RX ORDER — DEXTROSE 50 % IN WATER 50 %
12.5 SYRINGE (ML) INTRAVENOUS ONCE
Refills: 0 | Status: DISCONTINUED | OUTPATIENT
Start: 2023-07-23 | End: 2023-07-26

## 2023-07-23 RX ORDER — GLUCAGON INJECTION, SOLUTION 0.5 MG/.1ML
1 INJECTION, SOLUTION SUBCUTANEOUS ONCE
Refills: 0 | Status: DISCONTINUED | OUTPATIENT
Start: 2023-07-23 | End: 2023-07-26

## 2023-07-23 RX ORDER — HYDRALAZINE HCL 50 MG
50 TABLET ORAL
Refills: 0 | Status: DISCONTINUED | OUTPATIENT
Start: 2023-07-23 | End: 2023-07-23

## 2023-07-23 RX ORDER — INSULIN DETEMIR 100/ML (3)
10 INSULIN PEN (ML) SUBCUTANEOUS
Qty: 0 | Refills: 0 | DISCHARGE

## 2023-07-23 RX ORDER — ALBUTEROL 90 UG/1
3 AEROSOL, METERED ORAL
Refills: 0 | DISCHARGE

## 2023-07-23 RX ORDER — INSULIN ASPART 100 [IU]/ML
0 INJECTION, SOLUTION SUBCUTANEOUS
Qty: 0 | Refills: 0 | DISCHARGE

## 2023-07-23 RX ORDER — FOLIC ACID 0.8 MG
1 TABLET ORAL DAILY
Refills: 0 | Status: DISCONTINUED | OUTPATIENT
Start: 2023-07-23 | End: 2023-07-26

## 2023-07-23 RX ORDER — ONDANSETRON 8 MG/1
4 TABLET, FILM COATED ORAL EVERY 8 HOURS
Refills: 0 | Status: DISCONTINUED | OUTPATIENT
Start: 2023-07-23 | End: 2023-07-26

## 2023-07-23 RX ADMIN — POLYETHYLENE GLYCOL 3350 17 GRAM(S): 17 POWDER, FOR SOLUTION ORAL at 11:54

## 2023-07-23 RX ADMIN — HEPARIN SODIUM 5000 UNIT(S): 5000 INJECTION INTRAVENOUS; SUBCUTANEOUS at 18:15

## 2023-07-23 RX ADMIN — Medication 50 MILLIGRAM(S): at 06:47

## 2023-07-23 RX ADMIN — CLOPIDOGREL BISULFATE 75 MILLIGRAM(S): 75 TABLET, FILM COATED ORAL at 11:53

## 2023-07-23 RX ADMIN — SIMVASTATIN 40 MILLIGRAM(S): 20 TABLET, FILM COATED ORAL at 21:41

## 2023-07-23 RX ADMIN — Medication 4: at 18:11

## 2023-07-23 RX ADMIN — Medication 10 MILLIGRAM(S): at 11:53

## 2023-07-23 RX ADMIN — Medication 81 MILLIGRAM(S): at 11:54

## 2023-07-23 RX ADMIN — MONTELUKAST 10 MILLIGRAM(S): 4 TABLET, CHEWABLE ORAL at 11:53

## 2023-07-23 RX ADMIN — Medication 200 MILLIGRAM(S): at 06:47

## 2023-07-23 RX ADMIN — Medication 1: at 13:19

## 2023-07-23 RX ADMIN — Medication 1 MILLIGRAM(S): at 11:54

## 2023-07-23 RX ADMIN — SENNA PLUS 2 TABLET(S): 8.6 TABLET ORAL at 21:41

## 2023-07-23 RX ADMIN — HEPARIN SODIUM 5000 UNIT(S): 5000 INJECTION INTRAVENOUS; SUBCUTANEOUS at 06:47

## 2023-07-23 NOTE — H&P ADULT - HISTORY OF PRESENT ILLNESS
67yoF hx ESRD on HD, CAD s/p CABG, HTN, DM, R-knee replacement on 6/27/2023 performed by Dr. Chico Simms at [facility] presenting with intractable knee pain.  Pt states she was discharged the following day after surgery to home with outpatient PT. Pt eventually developed severe knee pain for the past few weeks and went to follow up with Dr. Simms in mid July who ordered X ray that showed a fracture and pt was placed in a large brace to be worn for about 6 weeks and prescribed short course of oxycodone 5mg and 10mg PRN.  Pt came to [facility] due to persistent R-knee pain and is also reporting constipation.

## 2023-07-23 NOTE — CONSULT NOTE ADULT - SUBJECTIVE AND OBJECTIVE BOX
67 year old female with PMH of DM, HTN, CAD s/p CABG, COPD and ESRD on HD MWF with recent R total knee replacement at [place] in 2023 presents with R knee pain. Imaging showed fracture of both the medial and lateral tibial plateau in the region of the prosthetic, with medial angulation. There is loosening of the prosthetic laterally. Admitted for further work up. Nephrology is consulted for resumption of hemodialysis.     PAST MEDICAL & SURGICAL HISTORY:  CAD (coronary artery disease)      CKD (chronic kidney disease) requiring chronic dialysis      Type 2 diabetes mellitus      Diabetic retinopathy associated with diabetes mellitus due to underlying condition      Hypertension      HLD (hyperlipidemia)      CVA (cerebral vascular accident)        Asthma      S/P CABG (coronary artery bypass graft)      S/P coronary artery stent placement  (1)       S/P cataract extraction  OS with retained lens fragment 7/15/15      Acute hemodialysis patient  nothing in the left arm awaiting AV fistula placement - temporary cath in right upper chest quadrant      S/P  section      S/P cholecystectomy    Allergies    shellfish (Swelling; Hives)  erythromycin (Vomiting)  lip swelling with lobster (Swelling)    Intolerances    Home Medications:  Albuterol (Eqv-ProAir HFA) 90 mcg/inh inhalation aerosol: 2 puff(s) inhaled every 6 hours as needed for  shortness of breath and/or wheezing (2023 05:40)  albuterol 2.5 mg/3 mL (0.083%) inhalation solution: 3 milliliter(s) by nebulizer every 6 hours as needed for  shortness of breath and/or wheezing (2023 05:40)  Aspir 81 oral delayed release tablet: 1 tab(s) orally once a day (03 Aug 2021 18:50)  folic acid 1 mg oral tablet: 1 tab(s) orally once a day (03 Aug 2021 18:50)  hydrALAZINE 50 mg oral tablet: 1 tab(s) orally 2 times a day (03 Aug 2021 18:50)  labetalol 200 mg oral tablet: 1 tab(s) orally 2 times a day (03 Aug 2021 18:50)  Levemir 100 units/mL subcutaneous solution: 6 unit(s) subcutaneous once a day (at bedtime) (2023 05:41)  NovoLOG 100 units/mL subcutaneous solution: subcutaneous sliding scale (2023 05:41)  Plavix 75 mg oral tablet: 1 tab(s) orally once a day (03 Aug 2021 18:50)  Singulair 10 mg oral tablet: 1 tab(s) orally once a day (03 Aug 2021 18:50)  Zocor 40 mg oral tablet: 1 tab(s) orally once a day (at bedtime) (03 Aug 2021 18:50)    I&O's Summary    Physical Exam  General: WDWN female in NAD  HEENT: Normocephalic  Cardiac: S1S2 RRR  Respiratory: CTAB  Abdomen: Soft, NT  Extremities: No appreciable edema  Neuro: AAOx3  Psych: Calm         138  |  96  |  20.6<H>  ----------------------------<  94  3.8   |  30.0<H>  |  5.60<H>    Ca    9.4      2023 18:35    TPro  7.1  /  Alb  3.3  /  TBili  0.3<L>  /  DBili  x   /  AST  17  /  ALT  <5  /  AlkPhos  124<H>                          9.2    6.31  )-----------( 428      ( 2023 18:35 )             31.0     MEDICATIONS  (STANDING):  aspirin enteric coated 81 milliGRAM(s) Oral daily  clopidogrel Tablet 75 milliGRAM(s) Oral daily  dextrose 5%. 1000 milliLiter(s) (50 mL/Hr) IV Continuous <Continuous>  dextrose 5%. 1000 milliLiter(s) (100 mL/Hr) IV Continuous <Continuous>  dextrose 50% Injectable 25 Gram(s) IV Push once  dextrose 50% Injectable 12.5 Gram(s) IV Push once  dextrose 50% Injectable 25 Gram(s) IV Push once  folic acid 1 milliGRAM(s) Oral daily  glucagon  Injectable 1 milliGRAM(s) IntraMuscular once  heparin   Injectable 5000 Unit(s) SubCutaneous every 12 hours  hydrALAZINE 50 milliGRAM(s) Oral every 8 hours  insulin lispro (ADMELOG) corrective regimen sliding scale   SubCutaneous three times a day before meals  insulin lispro (ADMELOG) corrective regimen sliding scale   SubCutaneous at bedtime  labetalol 200 milliGRAM(s) Oral two times a day  montelukast 10 milliGRAM(s) Oral daily  polyethylene glycol 3350 17 Gram(s) Oral daily  senna 2 Tablet(s) Oral at bedtime  simvastatin 40 milliGRAM(s) Oral at bedtime    MEDICATIONS  (PRN):  acetaminophen     Tablet .. 650 milliGRAM(s) Oral every 6 hours PRN Temp greater or equal to 38C (100.4F), Mild Pain (1 - 3)  bisacodyl Suppository 10 milliGRAM(s) Rectal daily PRN Constipation  dextrose Oral Gel 15 Gram(s) Oral once PRN Blood Glucose LESS THAN 70 milliGRAM(s)/deciliter  hydrALAZINE Injectable 10 milliGRAM(s) IV Push every 6 hours PRN SBP >160  HYDROmorphone  Injectable 0.5 milliGRAM(s) IV Push every 6 hours PRN Severe Pain (7 - 10)  magnesium hydroxide Suspension 30 milliLiter(s) Oral daily PRN Constipation  ondansetron Injectable 4 milliGRAM(s) IV Push every 8 hours PRN Nausea and/or Vomiting  oxyCODONE    IR 10 milliGRAM(s) Oral every 6 hours PRN Moderate Pain (4 - 6)

## 2023-07-23 NOTE — H&P ADULT - NSHPPHYSICALEXAM_GEN_ALL_CORE
Vital Signs Last 24 Hrs  T(C): 36.8 (23 Jul 2023 06:40), Max: 36.8 (23 Jul 2023 00:00)  T(F): 98.2 (23 Jul 2023 06:40), Max: 98.2 (23 Jul 2023 00:00)  HR: 69 (23 Jul 2023 06:40) (65 - 114)  BP: 192/67 (23 Jul 2023 06:40) (92/52 - 192/67)  BP(mean): 108 (23 Jul 2023 06:40) (108 - 108)  RR: 18 (23 Jul 2023 06:40) (18 - 18)  SpO2: 100% (23 Jul 2023 06:40) (95% - 100%)    Parameters below as of 23 Jul 2023 06:40  Patient On (Oxygen Delivery Method): room air    GENERAL:  Well-appearing, not in acute distress  EYES:  Clear conjunctiva, extraocular movement intact  ENT: Moist mucous membranes  RESP:  Non-labored breathing pattern, lungs clear to ausculation in anterior fields  CV: Regular rate and rhythm, no murmurs appreciated, no lower extremity edema, LUE AV graft with palpable thrill   GI: Soft, non-tender, non-distended  MSK: RLE in full brace  NEURO: Awake, alert, conversant, UE strength 5/5, able to move R-foot, but unable to assess full strength as in full brace, LLE 5/5, light touch sensation grossly intact  PSYCH: Calm, cooperative  SKIN: No rash or lesions, warm and dry

## 2023-07-23 NOTE — H&P ADULT - ASSESSMENT
67yoF hx ESRD on HD, CAD s/p CABG, HTN, DM, R-knee replacement on 6/27/2023 performed by Dr. Chico Simms at [facility] presenting with intractable knee pain    Intractable knee pain due to knee fracture  -ED attending, Dr. Soriano spoke with orthopedic surgeon, Dr. Simms who performed surgery, who is aware of post-operative periprosthetic fracture, recommended hinge brace, NWB and GENESIS  -RLE US shows small cystic structure, suspect post-surgical collection, probable small hematoma  -Pain control with oxycodone 10mg PRN and IV dilaudid 0.5mg PRN  -Bowel regimen (senna, miralax) for constipation  -PT consult    Hx ESRD with anemia of chronic disease  -Hgb around baseline, monitor   -Nephrology consulted by ED    Hx CAD  -Resume ASA, statin and plavix    Hx COPD  -Resume montelukast     Hx HTN  -Labetalol 200mg BID  -Hydralazine 50mg BID    Hx DM  -On Levemir 6U daily, will hold due to low normal serum glucose  -ISS for now, monitor FS closely    Prophylactic measure   -Heparin 5000units q12hr 67yoF hx ESRD on HD, CAD s/p CABG, HTN, DM, R-knee replacement on 6/27/2023 performed by Dr. Chico Simms at [facility] presenting with intractable knee pain    Intractable knee pain due to knee fracture  -ED attending, Dr. Soriano spoke with orthopedic surgeon, Dr. Simms who performed surgery, who is aware of post-operative periprosthetic fracture, recommended hinge brace, NWB and GENESIS  -RLE US shows small cystic structure, suspect post-surgical collection, probable small hematoma  -Pain control with oxycodone 10mg PRN and IV dilaudid 0.5mg PRN  -Bowel regimen (senna, miralax) for constipation  -PT consult    Hx ESRD with anemia of chronic disease  -Hgb around baseline, monitor   -Nephrology consulted by ED    Hx CAD  -Resume ASA, statin and plavix    Hx COPD  -Resume montelukast     Hx HTN  -Labetalol 200mg BID  -Hydralazine 50mg BID    Hx DM  -On Levemir 6U daily, will hold due to low normal serum glucose  -ISS for now, monitor FS closely    Prophylactic measure   -Heparin 5000units q12hr

## 2023-07-23 NOTE — PATIENT PROFILE ADULT - FALL HARM RISK - HARM RISK INTERVENTIONS

## 2023-07-23 NOTE — CONSULT NOTE ADULT - ASSESSMENT
67 year old female with PMH of DM, HTN, CAD s/p CABG, COPD and ESRD on HD MWF with recent R total knee replacement at [place] in June 2023 presents with R knee pain. Imaging showed fracture of both the medial and lateral tibial plateau in the region of the prosthetic, with medial angulation. There is loosening of the prosthetic laterally. Admitted for further work up. Nephrology is consulted for resumption of hemodialysis.  67 year old female with PMH of DM, HTN, CAD s/p CABG, COPD and ESRD on HD MWF with recent R total knee replacement at [place] in June 2023 presents with R knee pain. Imaging showed fracture of both the medial and lateral tibial plateau in the region of the prosthetic, with medial angulation. There is loosening of the prosthetic laterally. Admitted for further work up. Nephrology is consulted for resumption of hemodialysis.     -Access: L AV graft  -Outpatient dialysis days are Mondays Wednesdays Fridays  -Outpatient dialysis unit is at [place]; outpatient nephrologist is Dr Brandon  -Will dialyze tomorrow as per her usual outpatient dialysis schedule  -BP suboptimal - will add   -Anemia - hemoglobin is below goal; however BP suboptimal - will hold HOWARD for now    Adonis Delgado DO  Nephrology  67 year old female with PMH of DM, HTN, CAD s/p CABG, COPD and ESRD on HD MWF with recent R total knee replacement at [place] in June 2023 presents with R knee pain. Imaging showed fracture of both the medial and lateral tibial plateau in the region of the prosthetic, with medial angulation. There is loosening of the prosthetic laterally. Admitted for further work up. Nephrology is consulted for resumption of hemodialysis.     -Access: L AV graft  -Outpatient dialysis days are Mondays Wednesdays Fridays  -Outpatient dialysis unit is at [place]; outpatient nephrologist is Dr Brandon  -Will dialyze tomorrow as per her usual outpatient dialysis schedule  -BP suboptimal - will add   -Anemia - hemoglobin is below goal; however BP suboptimal - will hold HOWARD for now  -Consent for dialysis was signed by patient, witnessed by staff, scanned into chart and placed into paper chart    Adonis Delgado DO  Nephrology  67 year old female with PMH of DM, HTN, CAD s/p CABG, COPD and ESRD on HD MWF with recent R total knee replacement at [place] in June 2023 presents with R knee pain. Imaging showed fracture of both the medial and lateral tibial plateau in the region of the prosthetic, with medial angulation. There is loosening of the prosthetic laterally. Admitted for further work up. Nephrology is consulted for resumption of hemodialysis.     -Access: L AV graft  -Outpatient dialysis days are Mondays Wednesdays Fridays  -Outpatient dialysis unit is at [place]; outpatient nephrologist is Dr Brandon  -Will dialyze tomorrow as per her usual outpatient dialysis schedule  -BP - latest SBP in the 130s mmHg - continue current antihypertensive regimen   -Anemia - will add HOWARD with HD   -Consent for dialysis was signed by patient, witnessed by staff, scanned into chart and placed into paper chart    Adonis Delgado DO  Nephrology

## 2023-07-23 NOTE — H&P ADULT - NSHPLABSRESULTS_GEN_ALL_CORE
07-22    138  |  96  |  20.6<H>  ----------------------------<  94  3.8   |  30.0<H>  |  5.60<H>    Ca    9.4      22 Jul 2023 18:35    TPro  7.1  /  Alb  3.3  /  TBili  0.3<L>  /  DBili  x   /  AST  17  /  ALT  <5  /  AlkPhos  124<H>  07-22                            9.2    6.31  )-----------( 428      ( 22 Jul 2023 18:35 )             31.0

## 2023-07-23 NOTE — CHART NOTE - NSCHARTNOTEFT_GEN_A_CORE
admitted overnight  feels better  no BM  pain improved  requesting Jnaia placement    labs/imaging/vitals reviewed    increase hydralazine to 50mg TID  renal to see to restart MWF HD  d/w sw, needs 3 day stay for JANIA

## 2023-07-24 LAB
A1C WITH ESTIMATED AVERAGE GLUCOSE RESULT: 5.5 % — SIGNIFICANT CHANGE UP (ref 4–5.6)
ANION GAP SERPL CALC-SCNC: 15 MMOL/L — SIGNIFICANT CHANGE UP (ref 5–17)
BASOPHILS # BLD AUTO: 0.04 K/UL — SIGNIFICANT CHANGE UP (ref 0–0.2)
BASOPHILS NFR BLD AUTO: 0.7 % — SIGNIFICANT CHANGE UP (ref 0–2)
BUN SERPL-MCNC: 38.9 MG/DL — HIGH (ref 8–20)
CALCIUM SERPL-MCNC: 8.9 MG/DL — SIGNIFICANT CHANGE UP (ref 8.4–10.5)
CHLORIDE SERPL-SCNC: 96 MMOL/L — SIGNIFICANT CHANGE UP (ref 96–108)
CO2 SERPL-SCNC: 27 MMOL/L — SIGNIFICANT CHANGE UP (ref 22–29)
CREAT SERPL-MCNC: 8.13 MG/DL — HIGH (ref 0.5–1.3)
EGFR: 5 ML/MIN/1.73M2 — LOW
EOSINOPHIL # BLD AUTO: 0.89 K/UL — HIGH (ref 0–0.5)
EOSINOPHIL NFR BLD AUTO: 15.3 % — HIGH (ref 0–6)
ESTIMATED AVERAGE GLUCOSE: 111 MG/DL — SIGNIFICANT CHANGE UP (ref 68–114)
GLUCOSE BLDC GLUCOMTR-MCNC: 105 MG/DL — HIGH (ref 70–99)
GLUCOSE BLDC GLUCOMTR-MCNC: 112 MG/DL — HIGH (ref 70–99)
GLUCOSE BLDC GLUCOMTR-MCNC: 115 MG/DL — HIGH (ref 70–99)
GLUCOSE SERPL-MCNC: 136 MG/DL — HIGH (ref 70–99)
HCT VFR BLD CALC: 31.5 % — LOW (ref 34.5–45)
HGB BLD-MCNC: 9.2 G/DL — LOW (ref 11.5–15.5)
IMM GRANULOCYTES NFR BLD AUTO: 0.3 % — SIGNIFICANT CHANGE UP (ref 0–0.9)
LYMPHOCYTES # BLD AUTO: 1.69 K/UL — SIGNIFICANT CHANGE UP (ref 1–3.3)
LYMPHOCYTES # BLD AUTO: 29.1 % — SIGNIFICANT CHANGE UP (ref 13–44)
MCHC RBC-ENTMCNC: 28.3 PG — SIGNIFICANT CHANGE UP (ref 27–34)
MCHC RBC-ENTMCNC: 29.2 GM/DL — LOW (ref 32–36)
MCV RBC AUTO: 96.9 FL — SIGNIFICANT CHANGE UP (ref 80–100)
MONOCYTES # BLD AUTO: 0.58 K/UL — SIGNIFICANT CHANGE UP (ref 0–0.9)
MONOCYTES NFR BLD AUTO: 10 % — SIGNIFICANT CHANGE UP (ref 2–14)
MRSA PCR RESULT.: SIGNIFICANT CHANGE UP
NEUTROPHILS # BLD AUTO: 2.59 K/UL — SIGNIFICANT CHANGE UP (ref 1.8–7.4)
NEUTROPHILS NFR BLD AUTO: 44.6 % — SIGNIFICANT CHANGE UP (ref 43–77)
PLATELET # BLD AUTO: 397 K/UL — SIGNIFICANT CHANGE UP (ref 150–400)
POTASSIUM SERPL-MCNC: 4.6 MMOL/L — SIGNIFICANT CHANGE UP (ref 3.5–5.3)
POTASSIUM SERPL-SCNC: 4.6 MMOL/L — SIGNIFICANT CHANGE UP (ref 3.5–5.3)
RBC # BLD: 3.25 M/UL — LOW (ref 3.8–5.2)
RBC # FLD: 15.2 % — HIGH (ref 10.3–14.5)
S AUREUS DNA NOSE QL NAA+PROBE: SIGNIFICANT CHANGE UP
SODIUM SERPL-SCNC: 137 MMOL/L — SIGNIFICANT CHANGE UP (ref 135–145)
WBC # BLD: 5.81 K/UL — SIGNIFICANT CHANGE UP (ref 3.8–10.5)
WBC # FLD AUTO: 5.81 K/UL — SIGNIFICANT CHANGE UP (ref 3.8–10.5)

## 2023-07-24 PROCEDURE — 99232 SBSQ HOSP IP/OBS MODERATE 35: CPT

## 2023-07-24 PROCEDURE — 90935 HEMODIALYSIS ONE EVALUATION: CPT

## 2023-07-24 RX ORDER — OXYCODONE HYDROCHLORIDE 5 MG/1
5 TABLET ORAL EVERY 4 HOURS
Refills: 0 | Status: DISCONTINUED | OUTPATIENT
Start: 2023-07-24 | End: 2023-07-26

## 2023-07-24 RX ORDER — CHLORHEXIDINE GLUCONATE 213 G/1000ML
1 SOLUTION TOPICAL
Refills: 0 | Status: DISCONTINUED | OUTPATIENT
Start: 2023-07-24 | End: 2023-07-26

## 2023-07-24 RX ORDER — OXYCODONE HYDROCHLORIDE 5 MG/1
10 TABLET ORAL EVERY 6 HOURS
Refills: 0 | Status: DISCONTINUED | OUTPATIENT
Start: 2023-07-24 | End: 2023-07-26

## 2023-07-24 RX ORDER — POLYETHYLENE GLYCOL 3350 17 G/17G
17 POWDER, FOR SOLUTION ORAL
Refills: 0 | Status: DISCONTINUED | OUTPATIENT
Start: 2023-07-24 | End: 2023-07-26

## 2023-07-24 RX ORDER — HYDROMORPHONE HYDROCHLORIDE 2 MG/ML
0.5 INJECTION INTRAMUSCULAR; INTRAVENOUS; SUBCUTANEOUS EVERY 4 HOURS
Refills: 0 | Status: DISCONTINUED | OUTPATIENT
Start: 2023-07-24 | End: 2023-07-26

## 2023-07-24 RX ADMIN — POLYETHYLENE GLYCOL 3350 17 GRAM(S): 17 POWDER, FOR SOLUTION ORAL at 18:51

## 2023-07-24 RX ADMIN — Medication 200 MILLIGRAM(S): at 05:06

## 2023-07-24 RX ADMIN — Medication 1 MILLIGRAM(S): at 12:16

## 2023-07-24 RX ADMIN — HYDROMORPHONE HYDROCHLORIDE 0.5 MILLIGRAM(S): 2 INJECTION INTRAMUSCULAR; INTRAVENOUS; SUBCUTANEOUS at 13:48

## 2023-07-24 RX ADMIN — Medication 10 MILLIGRAM(S): at 06:07

## 2023-07-24 RX ADMIN — HEPARIN SODIUM 5000 UNIT(S): 5000 INJECTION INTRAVENOUS; SUBCUTANEOUS at 18:51

## 2023-07-24 RX ADMIN — HEPARIN SODIUM 5000 UNIT(S): 5000 INJECTION INTRAVENOUS; SUBCUTANEOUS at 05:07

## 2023-07-24 RX ADMIN — CLOPIDOGREL BISULFATE 75 MILLIGRAM(S): 75 TABLET, FILM COATED ORAL at 12:16

## 2023-07-24 RX ADMIN — HYDROMORPHONE HYDROCHLORIDE 0.5 MILLIGRAM(S): 2 INJECTION INTRAMUSCULAR; INTRAVENOUS; SUBCUTANEOUS at 10:41

## 2023-07-24 RX ADMIN — Medication 81 MILLIGRAM(S): at 12:16

## 2023-07-24 RX ADMIN — MAGNESIUM HYDROXIDE 30 MILLILITER(S): 400 TABLET, CHEWABLE ORAL at 12:16

## 2023-07-24 RX ADMIN — HYDROMORPHONE HYDROCHLORIDE 0.5 MILLIGRAM(S): 2 INJECTION INTRAMUSCULAR; INTRAVENOUS; SUBCUTANEOUS at 14:18

## 2023-07-24 RX ADMIN — ERYTHROPOIETIN 5000 UNIT(S): 10000 INJECTION, SOLUTION INTRAVENOUS; SUBCUTANEOUS at 20:43

## 2023-07-24 RX ADMIN — HYDROMORPHONE HYDROCHLORIDE 0.5 MILLIGRAM(S): 2 INJECTION INTRAMUSCULAR; INTRAVENOUS; SUBCUTANEOUS at 11:11

## 2023-07-24 RX ADMIN — Medication 50 MILLIGRAM(S): at 05:05

## 2023-07-24 RX ADMIN — MONTELUKAST 10 MILLIGRAM(S): 4 TABLET, CHEWABLE ORAL at 12:15

## 2023-07-24 NOTE — PHYSICAL THERAPY INITIAL EVALUATION ADULT - PERTINENT HX OF CURRENT PROBLEM, REHAB EVAL
67yoF hx ESRD on HD, CAD s/p CABG, HTN, DM, R-knee replacement on 6/27/2023 performed by Dr. Faizan Simms at [facility] presenting with intractable knee pain. Pt. with tibial plateau fx and brace from outpatient ortho visit with Dr. Simms

## 2023-07-25 LAB
GLUCOSE BLDC GLUCOMTR-MCNC: 109 MG/DL — HIGH (ref 70–99)
GLUCOSE BLDC GLUCOMTR-MCNC: 119 MG/DL — HIGH (ref 70–99)
GLUCOSE BLDC GLUCOMTR-MCNC: 122 MG/DL — HIGH (ref 70–99)
GLUCOSE BLDC GLUCOMTR-MCNC: 135 MG/DL — HIGH (ref 70–99)
GLUCOSE BLDC GLUCOMTR-MCNC: 143 MG/DL — HIGH (ref 70–99)
GLUCOSE BLDC GLUCOMTR-MCNC: 99 MG/DL — SIGNIFICANT CHANGE UP (ref 70–99)
HBV CORE AB SER-ACNC: SIGNIFICANT CHANGE UP
HBV CORE IGM SER-ACNC: SIGNIFICANT CHANGE UP
HBV SURFACE AB SER-ACNC: 33.6 MIU/ML — SIGNIFICANT CHANGE UP
HBV SURFACE AG SER-ACNC: SIGNIFICANT CHANGE UP
HCV AB S/CO SERPL IA: 0.33 S/CO — SIGNIFICANT CHANGE UP (ref 0–0.99)
HCV AB SERPL-IMP: SIGNIFICANT CHANGE UP

## 2023-07-25 PROCEDURE — 71045 X-RAY EXAM CHEST 1 VIEW: CPT | Mod: 26

## 2023-07-25 PROCEDURE — 99232 SBSQ HOSP IP/OBS MODERATE 35: CPT

## 2023-07-25 RX ORDER — BACITRACIN ZINC 500 UNIT/G
1 OINTMENT IN PACKET (EA) TOPICAL ONCE
Refills: 0 | Status: COMPLETED | OUTPATIENT
Start: 2023-07-25 | End: 2023-07-25

## 2023-07-25 RX ORDER — MINERAL OIL
133 OIL (ML) MISCELLANEOUS DAILY
Refills: 0 | Status: DISCONTINUED | OUTPATIENT
Start: 2023-07-25 | End: 2023-07-26

## 2023-07-25 RX ADMIN — Medication 50 MILLIGRAM(S): at 00:50

## 2023-07-25 RX ADMIN — POLYETHYLENE GLYCOL 3350 17 GRAM(S): 17 POWDER, FOR SOLUTION ORAL at 05:23

## 2023-07-25 RX ADMIN — Medication 200 MILLIGRAM(S): at 05:31

## 2023-07-25 RX ADMIN — SIMVASTATIN 40 MILLIGRAM(S): 20 TABLET, FILM COATED ORAL at 00:50

## 2023-07-25 RX ADMIN — OXYCODONE HYDROCHLORIDE 5 MILLIGRAM(S): 5 TABLET ORAL at 09:08

## 2023-07-25 RX ADMIN — Medication 50 MILLIGRAM(S): at 13:29

## 2023-07-25 RX ADMIN — Medication 133 MILLILITER(S): at 11:00

## 2023-07-25 RX ADMIN — Medication 81 MILLIGRAM(S): at 12:17

## 2023-07-25 RX ADMIN — MONTELUKAST 10 MILLIGRAM(S): 4 TABLET, CHEWABLE ORAL at 12:17

## 2023-07-25 RX ADMIN — Medication 50 MILLIGRAM(S): at 05:23

## 2023-07-25 RX ADMIN — Medication 1 APPLICATION(S): at 21:25

## 2023-07-25 RX ADMIN — OXYCODONE HYDROCHLORIDE 5 MILLIGRAM(S): 5 TABLET ORAL at 08:08

## 2023-07-25 RX ADMIN — CLOPIDOGREL BISULFATE 75 MILLIGRAM(S): 75 TABLET, FILM COATED ORAL at 12:17

## 2023-07-25 RX ADMIN — HEPARIN SODIUM 5000 UNIT(S): 5000 INJECTION INTRAVENOUS; SUBCUTANEOUS at 05:23

## 2023-07-25 RX ADMIN — HEPARIN SODIUM 5000 UNIT(S): 5000 INJECTION INTRAVENOUS; SUBCUTANEOUS at 21:25

## 2023-07-25 RX ADMIN — SENNA PLUS 2 TABLET(S): 8.6 TABLET ORAL at 00:50

## 2023-07-25 RX ADMIN — Medication 1 MILLIGRAM(S): at 12:17

## 2023-07-25 RX ADMIN — CHLORHEXIDINE GLUCONATE 1 APPLICATION(S): 213 SOLUTION TOPICAL at 05:23

## 2023-07-25 RX ADMIN — MAGNESIUM HYDROXIDE 30 MILLILITER(S): 400 TABLET, CHEWABLE ORAL at 21:25

## 2023-07-25 RX ADMIN — SIMVASTATIN 40 MILLIGRAM(S): 20 TABLET, FILM COATED ORAL at 21:25

## 2023-07-25 RX ADMIN — Medication 650 MILLIGRAM(S): at 21:24

## 2023-07-25 RX ADMIN — Medication 650 MILLIGRAM(S): at 22:24

## 2023-07-25 RX ADMIN — Medication 50 MILLIGRAM(S): at 21:25

## 2023-07-25 RX ADMIN — SENNA PLUS 2 TABLET(S): 8.6 TABLET ORAL at 21:25

## 2023-07-25 RX ADMIN — Medication 200 MILLIGRAM(S): at 17:17

## 2023-07-26 ENCOUNTER — TRANSCRIPTION ENCOUNTER (OUTPATIENT)
Age: 68
End: 2023-07-26

## 2023-07-26 VITALS
SYSTOLIC BLOOD PRESSURE: 148 MMHG | OXYGEN SATURATION: 96 % | DIASTOLIC BLOOD PRESSURE: 80 MMHG | HEART RATE: 86 BPM | RESPIRATION RATE: 16 BRPM

## 2023-07-26 LAB
GLUCOSE BLDC GLUCOMTR-MCNC: 92 MG/DL — SIGNIFICANT CHANGE UP (ref 70–99)
HAV IGM SER-ACNC: SIGNIFICANT CHANGE UP
HBV CORE IGM SER-ACNC: SIGNIFICANT CHANGE UP
HBV SURFACE AG SER-ACNC: SIGNIFICANT CHANGE UP
HCV AB S/CO SERPL IA: 0.21 S/CO — SIGNIFICANT CHANGE UP (ref 0–0.99)
HCV AB SERPL-IMP: SIGNIFICANT CHANGE UP

## 2023-07-26 PROCEDURE — 85610 PROTHROMBIN TIME: CPT

## 2023-07-26 PROCEDURE — 85025 COMPLETE CBC W/AUTO DIFF WBC: CPT

## 2023-07-26 PROCEDURE — 86704 HEP B CORE ANTIBODY TOTAL: CPT

## 2023-07-26 PROCEDURE — 82962 GLUCOSE BLOOD TEST: CPT

## 2023-07-26 PROCEDURE — 99261: CPT

## 2023-07-26 PROCEDURE — 90935 HEMODIALYSIS ONE EVALUATION: CPT

## 2023-07-26 PROCEDURE — 80053 COMPREHEN METABOLIC PANEL: CPT

## 2023-07-26 PROCEDURE — 86705 HEP B CORE ANTIBODY IGM: CPT

## 2023-07-26 PROCEDURE — 86803 HEPATITIS C AB TEST: CPT

## 2023-07-26 PROCEDURE — 99239 HOSP IP/OBS DSCHRG MGMT >30: CPT

## 2023-07-26 PROCEDURE — 86900 BLOOD TYPING SEROLOGIC ABO: CPT

## 2023-07-26 PROCEDURE — 86850 RBC ANTIBODY SCREEN: CPT

## 2023-07-26 PROCEDURE — 87340 HEPATITIS B SURFACE AG IA: CPT

## 2023-07-26 PROCEDURE — 87641 MR-STAPH DNA AMP PROBE: CPT

## 2023-07-26 PROCEDURE — 36415 COLL VENOUS BLD VENIPUNCTURE: CPT

## 2023-07-26 PROCEDURE — 85730 THROMBOPLASTIN TIME PARTIAL: CPT

## 2023-07-26 PROCEDURE — 83036 HEMOGLOBIN GLYCOSYLATED A1C: CPT

## 2023-07-26 PROCEDURE — 71045 X-RAY EXAM CHEST 1 VIEW: CPT

## 2023-07-26 PROCEDURE — 87640 STAPH A DNA AMP PROBE: CPT

## 2023-07-26 PROCEDURE — 80074 ACUTE HEPATITIS PANEL: CPT

## 2023-07-26 PROCEDURE — 99285 EMERGENCY DEPT VISIT HI MDM: CPT | Mod: 25

## 2023-07-26 PROCEDURE — 86901 BLOOD TYPING SEROLOGIC RH(D): CPT

## 2023-07-26 PROCEDURE — 86706 HEP B SURFACE ANTIBODY: CPT

## 2023-07-26 PROCEDURE — 73564 X-RAY EXAM KNEE 4 OR MORE: CPT

## 2023-07-26 PROCEDURE — 80048 BASIC METABOLIC PNL TOTAL CA: CPT

## 2023-07-26 RX ORDER — INSULIN ASPART 100 [IU]/ML
0 INJECTION, SOLUTION SUBCUTANEOUS
Refills: 0 | DISCHARGE

## 2023-07-26 RX ORDER — SENNA PLUS 8.6 MG/1
2 TABLET ORAL
Qty: 0 | Refills: 0 | DISCHARGE
Start: 2023-07-26

## 2023-07-26 RX ORDER — OXYCODONE HYDROCHLORIDE 5 MG/1
1 TABLET ORAL
Qty: 0 | Refills: 0 | DISCHARGE
Start: 2023-07-26

## 2023-07-26 RX ORDER — INSULIN DETEMIR 100/ML (3)
6 INSULIN PEN (ML) SUBCUTANEOUS
Refills: 0 | DISCHARGE

## 2023-07-26 RX ORDER — ACETAMINOPHEN 500 MG
2 TABLET ORAL
Qty: 0 | Refills: 0 | DISCHARGE
Start: 2023-07-26

## 2023-07-26 RX ORDER — POLYETHYLENE GLYCOL 3350 17 G/17G
17 POWDER, FOR SOLUTION ORAL
Qty: 0 | Refills: 0 | DISCHARGE
Start: 2023-07-26

## 2023-07-26 RX ORDER — MAGNESIUM HYDROXIDE 400 MG/1
30 TABLET, CHEWABLE ORAL
Qty: 0 | Refills: 0 | DISCHARGE
Start: 2023-07-26

## 2023-07-26 RX ADMIN — ERYTHROPOIETIN 5000 UNIT(S): 10000 INJECTION, SOLUTION INTRAVENOUS; SUBCUTANEOUS at 10:26

## 2023-07-26 RX ADMIN — Medication 1 MILLIGRAM(S): at 14:21

## 2023-07-26 RX ADMIN — CHLORHEXIDINE GLUCONATE 1 APPLICATION(S): 213 SOLUTION TOPICAL at 05:08

## 2023-07-26 RX ADMIN — Medication 50 MILLIGRAM(S): at 14:20

## 2023-07-26 RX ADMIN — MONTELUKAST 10 MILLIGRAM(S): 4 TABLET, CHEWABLE ORAL at 14:21

## 2023-07-26 RX ADMIN — Medication 10 MILLIGRAM(S): at 14:21

## 2023-07-26 RX ADMIN — Medication 81 MILLIGRAM(S): at 14:20

## 2023-07-26 RX ADMIN — Medication 200 MILLIGRAM(S): at 05:06

## 2023-07-26 RX ADMIN — Medication 50 MILLIGRAM(S): at 05:06

## 2023-07-26 RX ADMIN — CLOPIDOGREL BISULFATE 75 MILLIGRAM(S): 75 TABLET, FILM COATED ORAL at 14:21

## 2023-07-26 NOTE — ADVANCED PRACTICE NURSE CONSULT - ASSESSMENT
Patient laying supine in bed, turned to left (pillow under side), HOB elevated 30 degrees. Pt awake, A&Ox3, made aware of purpose of WOCN visit, agreeable to consult.  Ordered for DASH/TLC diet, current Jarrod score of 17. With assistance, patient turned patient to side for skin assessment. Skin assessment as below:    ·	Right Buttock - Stage 2 Pressure Injury (1.5x1cm) - red/pink moist wound bed with well-defined edges, moderate serosanguinous drainage.  Periwound red/pink with blanching hyperpigmentation (chronic tissue injury), moist, fissuring, skin with evolving blister formation.

## 2023-07-26 NOTE — PROGRESS NOTE ADULT - ASSESSMENT
67F with PMH of DM, HTN, CAD s/p CABG, COPD and ESRD on HD MWF with recent R total knee replacement at [place] in June 2023 presents with R knee pain. Imaging showed fracture of both the medial and lateral tibial plateau in the region of the prosthetic, with medial angulation. There is loosening of the prosthetic laterally. Admitted for further work up. Nephrology is consulted for resumption of hemodialysis.     -Access: L AV graft  -Outpatient dialysis days are Mondays Wednesdays Fridays  -Outpatient dialysis unit is at [place]; outpatient nephrologist is Dr Brandon  -Will dialyze today as per her usual outpatient dialysis schedule  -BP - WNL - continue current antihypertensive regimen   -Anemia - HOWARD with HD     Later, Seen on HD.  Qd, Qb, UF rate reviewed.  Vitals stable.  Access working well.  Patient tolerating HD well.
67yoF hx ESRD on HD, CAD s/p CABG, HTN, DM, R-knee replacement on 6/27/2023 performed by Dr. Faizan Simms at [facility] presenting with intractable knee pain.     Intractable knee pain due to knee fracture  -ED attending, Dr. Soriano spoke with orthopedic surgeon, Dr. Simms who performed surgery, who is aware of post-operative periprosthetic fracture, recommended hinge brace, NWB and PATRICIA  -RLE US shows small cystic structure, suspect post-surgical collection, probable small hematoma  -Pain control with oxycodone 5mg/10mg PRN and IV dilaudid 0.5mg PRN  aggressive bowel regimen   enema today  -PT consult--patricia     Hx ESRD with anemia of chronic disease  -Hgb around baseline, monitor   nephrology following     Hx CAD  -ASA, statin and plavix    Hx COPD  -montelukast     Hx HTN  -Labetalol 200mg BID  -Hydralazine 50mg BID--> 50TID    Hx DM  -On Levemir 6U daily, will hold due to low normal serum glucose  sliding scale     Prophylactic measure   -Heparin 5000units q12hr    medically stable for dc  dc planning 
67F with PMH of DM, HTN, CAD s/p CABG, COPD and ESRD on HD MWF with recent R total knee replacement at [place] in June 2023 presents with R knee pain. Imaging showed fracture of both the medial and lateral tibial plateau in the region of the prosthetic, with medial angulation. There is loosening of the prosthetic laterally. Admitted for further work up. Nephrology is consulted for resumption of hemodialysis.     -Access: L AV graft  -Outpatient dialysis days are Mondays Wednesdays Fridays  -Outpatient dialysis unit is at [place]; outpatient nephrologist is Dr Brandon  -Will dialyze today as per her usual outpatient dialysis schedule  -BP - WNL - continue current antihypertensive regimen   -Anemia - HOWARD with HD     Seen on HD.  Qd, Qb, UF rate reviewed.  Vitals stable.  Access working well.  Patient tolerating HD well.
67yoF hx ESRD on HD, CAD s/p CABG, HTN, DM, R-knee replacement on 6/27/2023 performed by Dr. Faizan Simms at [facility] presenting with intractable knee pain.     Intractable knee pain due to knee fracture  -ED attending, Dr. Soriano spoke with orthopedic surgeon, Dr. Simms who performed surgery, who is aware of post-operative periprosthetic fracture, recommended hinge brace, NWB and GENESIS  -RLE US shows small cystic structure, suspect post-surgical collection, probable small hematoma  -Pain control with oxycodone 5mg/10mg PRN and IV dilaudid 0.5mg PRN  aggressive bowel regimen  -PT consult    Hx ESRD with anemia of chronic disease  -Hgb around baseline, monitor   nephrology following     Hx CAD  -Resume ASA, statin and plavix    Hx COPD  -Resume montelukast     Hx HTN  -Labetalol 200mg BID  -Hydralazine 50mg BID--> 50TID    Hx DM  -On Levemir 6U daily, will hold due to low normal serum glucose  sliding scale     Prophylactic measure   -Heparin 5000units q12hr

## 2023-07-26 NOTE — DISCHARGE NOTE PROVIDER - NSDCCPCAREPLAN_GEN_ALL_CORE_FT
PRINCIPAL DISCHARGE DIAGNOSIS  Diagnosis: Periprosthetic fracture of proximal end of tibia  Assessment and Plan of Treatment: discharge to [facility]  follow up with your orthopedic surgeon      SECONDARY DISCHARGE DIAGNOSES  Diagnosis: Diabetes mellitus  Assessment and Plan of Treatment: please use insulin sliding scale and monitor blood glucose before meals    Diagnosis: ESRD on dialysis  Assessment and Plan of Treatment: continue dialysis per your schedule

## 2023-07-26 NOTE — PROGRESS NOTE ADULT - REASON FOR ADMISSION
Intractable knee pain, periprosthetic fracture

## 2023-07-26 NOTE — DISCHARGE NOTE PROVIDER - YES NO FOR MLM POSITIVE OR NEGATIVE COVID RESULT
VIDEO VISIT PROGRESS NOTE      CHIEF COMPLAINT  Fever 9 Weeks to 74 years      SUBJECTIVE  The patient is a 65 year old male who is requesting a Video Visit (V-Visit) for evaluation of  Fever 9 Weeks to 74 years   started 3 days ago.  Symptoms are worsened.  Associated symptoms include cough, sinus pressure.  For symptom relief Aayan Guerra has tried over-the-counter Tylenol and tessalon perles, mucinex. Did get flu vaccine. Fever up to 102, just started today.    Reports getting bronchitis once a year. Uses rescue inhaler as needed. History of blood clot in 90's, not recent but on warfarin. No recent travel. No contact with anyone +COVID19.      MEDICATIONS    The medication list in the medical record as well as updates to the medication list submitted by the patient with this V-Visit were reviewed and updated today.      HISTORIES  I have personally reviewed and updated the following electronic medical record sections: Current medications, Allergies and Problem list    REVIEW OF SYSTEMS  Ears:  Denies ear symptoms in Both ears  Nose:  clear drainage  Throat:  Normal  Cough:  Dry, nonproductive. Denies wheezing and SOB. Tried albuterol dose just today. Maybe helped a little.  Sinus:  No pain     Family/Social History:  not ill    Energy:  No Change  Appetite:  No Change    PHYSICAL EXAM  He is alert, nontoxic with fluent speech      ASSESSMENT/PLAN  Acute bronchitis, unspecified organism  - benzonatate (TESSALON PERLES) 200 MG capsule; Take 1 capsule by mouth 3 times daily as needed for Cough.  Dispense: 21 capsule; Refill: 0    Acute bacterial rhinosinusitis  - amoxicillin-clavulanate (AUGMENTIN) 875-125 MG per tablet; Take 1 tablet by mouth 2 times daily for 10 days.  Dispense: 20 tablet; Refill: 0    Does not need refill of albuterol.  FOLLOW UP  Return if symptoms worsen or fail to improve.  
,

## 2023-07-26 NOTE — DISCHARGE NOTE NURSING/CASE MANAGEMENT/SOCIAL WORK - PATIENT PORTAL LINK FT
You can access the FollowMyHealth Patient Portal offered by [facility] by registering at the following website: [email or web address]. By joining [facility]’s FollowMyHealth portal, you will also be able to view your health information using other applications (apps) compatible with our system.

## 2023-07-26 NOTE — PROGRESS NOTE ADULT - SUBJECTIVE AND OBJECTIVE BOX
Reason for visit: ESRD    Subjective: No acute overnight event. Pain controlled. Seen on HD     ROS: All systems were reviewed in detail pertinent positive and negative mentioned above, rest are negative.    Physical Exam:  Gen: no acute distress  MS: alert, conversing normally  HENT: NCAT, MMM  CV: rhythm reg reg, rate normal, no m/g/r  Chest: CTAB, no w/r/r,  Abd: soft, NT, ND  Extremities: No edema    =======================================================  Vital Signs Last 24 Hrs  T(C): 36.9 (26 Jul 2023 08:04), Max: 37.1 (25 Jul 2023 11:14)  T(F): 98.4 (26 Jul 2023 08:04), Max: 98.8 (25 Jul 2023 11:14)  HR: 70 (26 Jul 2023 08:04) (69 - 92)  BP: 126/62 (26 Jul 2023 08:04) (95/55 - 178/72)  BP(mean): 107 (26 Jul 2023 04:02) (107 - 107)  RR: 18 (26 Jul 2023 08:04) (17 - 18)  SpO2: 98% (26 Jul 2023 08:04) (96% - 100%)    Parameters below as of 26 Jul 2023 08:04  Patient On (Oxygen Delivery Method): room air      I&O's Summary    25 Jul 2023 07:01  -  26 Jul 2023 07:00  --------------------------------------------------------  IN: 120 mL / OUT: 0 mL / NET: 120 mL      =======================================================  Current Antibiotics:    Other medications:  aspirin enteric coated 81 milliGRAM(s) Oral daily  chlorhexidine 2% Cloths 1 Application(s) Topical <User Schedule>  clopidogrel Tablet 75 milliGRAM(s) Oral daily  dextrose 5%. 1000 milliLiter(s) IV Continuous <Continuous>  dextrose 5%. 1000 milliLiter(s) IV Continuous <Continuous>  dextrose 50% Injectable 12.5 Gram(s) IV Push once  dextrose 50% Injectable 25 Gram(s) IV Push once  dextrose 50% Injectable 25 Gram(s) IV Push once  epoetin dereck-epbx (RETACRIT) Injectable 5000 Unit(s) IV Push <User Schedule>  folic acid 1 milliGRAM(s) Oral daily  glucagon  Injectable 1 milliGRAM(s) IntraMuscular once  heparin   Injectable 5000 Unit(s) SubCutaneous every 12 hours  hydrALAZINE 50 milliGRAM(s) Oral every 8 hours  insulin lispro (ADMELOG) corrective regimen sliding scale   SubCutaneous at bedtime  insulin lispro (ADMELOG) corrective regimen sliding scale   SubCutaneous three times a day before meals  labetalol 200 milliGRAM(s) Oral two times a day  montelukast 10 milliGRAM(s) Oral daily  polyethylene glycol 3350 17 Gram(s) Oral two times a day  senna 2 Tablet(s) Oral at bedtime  simvastatin 40 milliGRAM(s) Oral at bedtime    =======================================================        Creatinine: 8.13 mg/dL (07-24-23 @ 02:40)  Creatinine: 5.60 mg/dL (07-22-23 @ 18:35)      =======================================================      
  pain ok  constipated  ros negative    MEDICATIONS  (STANDING):  aspirin enteric coated 81 milliGRAM(s) Oral daily  chlorhexidine 2% Cloths 1 Application(s) Topical <User Schedule>  clopidogrel Tablet 75 milliGRAM(s) Oral daily  dextrose 5%. 1000 milliLiter(s) (50 mL/Hr) IV Continuous <Continuous>  dextrose 5%. 1000 milliLiter(s) (100 mL/Hr) IV Continuous <Continuous>  dextrose 50% Injectable 25 Gram(s) IV Push once  dextrose 50% Injectable 12.5 Gram(s) IV Push once  dextrose 50% Injectable 25 Gram(s) IV Push once  epoetin dereck-epbx (RETACRIT) Injectable 5000 Unit(s) IV Push <User Schedule>  folic acid 1 milliGRAM(s) Oral daily  glucagon  Injectable 1 milliGRAM(s) IntraMuscular once  heparin   Injectable 5000 Unit(s) SubCutaneous every 12 hours  hydrALAZINE 50 milliGRAM(s) Oral every 8 hours  insulin lispro (ADMELOG) corrective regimen sliding scale   SubCutaneous three times a day before meals  insulin lispro (ADMELOG) corrective regimen sliding scale   SubCutaneous at bedtime  labetalol 200 milliGRAM(s) Oral two times a day  montelukast 10 milliGRAM(s) Oral daily  polyethylene glycol 3350 17 Gram(s) Oral two times a day  senna 2 Tablet(s) Oral at bedtime  simvastatin 40 milliGRAM(s) Oral at bedtime    MEDICATIONS  (PRN):  acetaminophen     Tablet .. 650 milliGRAM(s) Oral every 6 hours PRN Temp greater or equal to 38C (100.4F), Mild Pain (1 - 3)  bisacodyl Suppository 10 milliGRAM(s) Rectal daily PRN Constipation  dextrose Oral Gel 15 Gram(s) Oral once PRN Blood Glucose LESS THAN 70 milliGRAM(s)/deciliter  hydrALAZINE Injectable 10 milliGRAM(s) IV Push every 6 hours PRN SBP >160  HYDROmorphone  Injectable 0.5 milliGRAM(s) IV Push every 6 hours PRN Severe Pain (7 - 10)  magnesium hydroxide Suspension 30 milliLiter(s) Oral daily PRN Constipation  ondansetron Injectable 4 milliGRAM(s) IV Push every 8 hours PRN Nausea and/or Vomiting  oxyCODONE    IR 10 milliGRAM(s) Oral every 6 hours PRN Moderate Pain (4 - 6)      Allergies    shellfish (Swelling; Hives)  erythromycin (Vomiting)  lip swelling with lobster (Swelling)    Vital Signs Last 24 Hrs  T(C): 36.7 (24 Jul 2023 10:59), Max: 36.9 (24 Jul 2023 03:50)  T(F): 98 (24 Jul 2023 10:59), Max: 98.4 (24 Jul 2023 03:50)  HR: 64 (24 Jul 2023 10:59) (60 - 76)  BP: 117/71 (24 Jul 2023 10:59) (104/57 - 174/72)  BP(mean): 72 (23 Jul 2023 17:06) (72 - 72)  RR: 17 (24 Jul 2023 11:01) (16 - 187)  SpO2: 99% (24 Jul 2023 10:59) (94% - 100%)    Parameters below as of 24 Jul 2023 11:01  Patient On (Oxygen Delivery Method): room air        PHYSICAL EXAM:    GENERAL: NAD  CHEST/LUNG: Clear to ausculation bilaterally  HEART: Regular rate and rhythm; S1 S2  ABDOMEN: Soft, Nontender,  Bowel sounds present  EXTREMITIES: no edema   right knee incision c/d/i   NERVOUS SYSTEM:  Alert & Oriented X3, nonfocal      LABS:                        9.2    5.81  )-----------( 397      ( 24 Jul 2023 02:40 )             31.5     07-24    137  |  96  |  38.9<H>  ----------------------------<  136<H>  4.6   |  27.0  |  8.13<H>    Ca    8.9      24 Jul 2023 02:40    TPro  7.1  /  Alb  3.3  /  TBili  0.3<L>  /  DBili  x   /  AST  17  /  ALT  <5  /  AlkPhos  124<H>  07-22    PT/INR - ( 22 Jul 2023 18:35 )   PT: 11.5 sec;   INR: 0.99 ratio         PTT - ( 22 Jul 2023 18:35 )  PTT:27.2 sec  Urinalysis Basic - ( 24 Jul 2023 02:40 )    Color: x / Appearance: x / SG: x / pH: x  Gluc: 136 mg/dL / Ketone: x  / Bili: x / Urobili: x   Blood: x / Protein: x / Nitrite: x   Leuk Esterase: x / RBC: x / WBC x   Sq Epi: x / Non Sq Epi: x / Bacteria: x        CAPILLARY BLOOD GLUCOSE      POCT Blood Glucose.: 115 mg/dL (24 Jul 2023 09:25)  POCT Blood Glucose.: 151 mg/dL (23 Jul 2023 21:37)  POCT Blood Glucose.: 326 mg/dL (23 Jul 2023 17:43)  POCT Blood Glucose.: 158 mg/dL (23 Jul 2023 13:17)        RADIOLOGY & ADDITIONAL TESTS:  
Reason for visit: ESRD    Subjective: No acute overnight event. Pain controlled. Seen on HD     ROS: All systems were reviewed in detail pertinent positive and negative mentioned above, rest are negative.    Physical Exam:  Gen: no acute distress  MS: alert, conversing normally  HENT: NCAT, MMM  CV: rhythm reg reg, rate normal, no m/g/r  Chest: CTAB, no w/r/r,  Abd: soft, NT, ND  Extremities: No edema    =======================================================  Vital Signs Last 24 Hrs  T(C): 36.6 (24 Jul 2023 23:10), Max: 36.9 (24 Jul 2023 03:50)  T(F): 97.8 (24 Jul 2023 23:10), Max: 98.4 (24 Jul 2023 03:50)  HR: 77 (24 Jul 2023 23:10) (64 - 77)  BP: 126/57 (24 Jul 2023 23:10) (117/71 - 174/72)  BP(mean): 97 (24 Jul 2023 16:33) (97 - 97)  RR: 18 (24 Jul 2023 23:10) (16 - 187)  SpO2: 98% (24 Jul 2023 23:10) (93% - 100%)    Parameters below as of 24 Jul 2023 23:10  Patient On (Oxygen Delivery Method): room air      I&O's Summary    24 Jul 2023 07:01  -  24 Jul 2023 23:27  --------------------------------------------------------  IN: 0 mL / OUT: 2000 mL / NET: -2000 mL      =======================================================  Current Antibiotics:    Other medications:  aspirin enteric coated 81 milliGRAM(s) Oral daily  chlorhexidine 2% Cloths 1 Application(s) Topical <User Schedule>  clopidogrel Tablet 75 milliGRAM(s) Oral daily  dextrose 5%. 1000 milliLiter(s) IV Continuous <Continuous>  dextrose 5%. 1000 milliLiter(s) IV Continuous <Continuous>  dextrose 50% Injectable 25 Gram(s) IV Push once  dextrose 50% Injectable 12.5 Gram(s) IV Push once  dextrose 50% Injectable 25 Gram(s) IV Push once  epoetin dereck-epbx (RETACRIT) Injectable 5000 Unit(s) IV Push <User Schedule>  folic acid 1 milliGRAM(s) Oral daily  glucagon  Injectable 1 milliGRAM(s) IntraMuscular once  heparin   Injectable 5000 Unit(s) SubCutaneous every 12 hours  hydrALAZINE 50 milliGRAM(s) Oral every 8 hours  insulin lispro (ADMELOG) corrective regimen sliding scale   SubCutaneous three times a day before meals  insulin lispro (ADMELOG) corrective regimen sliding scale   SubCutaneous at bedtime  labetalol 200 milliGRAM(s) Oral two times a day  montelukast 10 milliGRAM(s) Oral daily  polyethylene glycol 3350 17 Gram(s) Oral two times a day  senna 2 Tablet(s) Oral at bedtime  simvastatin 40 milliGRAM(s) Oral at bedtime    =======================================================  07-24    137  |  96  |  38.9<H>  ----------------------------<  136<H>  4.6   |  27.0  |  8.13<H>    Ca    8.9      24 Jul 2023 02:40      Creatinine: 8.13 mg/dL (07-24-23 @ 02:40)  Creatinine: 5.60 mg/dL (07-22-23 @ 18:35)    Urinalysis Basic - ( 24 Jul 2023 02:40 )    Color: x / Appearance: x / SG: x / pH: x  Gluc: 136 mg/dL / Ketone: x  / Bili: x / Urobili: x   Blood: x / Protein: x / Nitrite: x   Leuk Esterase: x / RBC: x / WBC x   Sq Epi: x / Non Sq Epi: x / Bacteria: x      =======================================================      
  seen for esrd, knee pain    + constipation  pain controlled  ros negative     MEDICATIONS  (STANDING):  aspirin enteric coated 81 milliGRAM(s) Oral daily  chlorhexidine 2% Cloths 1 Application(s) Topical <User Schedule>  clopidogrel Tablet 75 milliGRAM(s) Oral daily  dextrose 5%. 1000 milliLiter(s) (50 mL/Hr) IV Continuous <Continuous>  dextrose 5%. 1000 milliLiter(s) (100 mL/Hr) IV Continuous <Continuous>  dextrose 50% Injectable 25 Gram(s) IV Push once  dextrose 50% Injectable 12.5 Gram(s) IV Push once  dextrose 50% Injectable 25 Gram(s) IV Push once  epoetin dereck-epbx (RETACRIT) Injectable 5000 Unit(s) IV Push <User Schedule>  folic acid 1 milliGRAM(s) Oral daily  glucagon  Injectable 1 milliGRAM(s) IntraMuscular once  heparin   Injectable 5000 Unit(s) SubCutaneous every 12 hours  hydrALAZINE 50 milliGRAM(s) Oral every 8 hours  insulin lispro (ADMELOG) corrective regimen sliding scale   SubCutaneous three times a day before meals  insulin lispro (ADMELOG) corrective regimen sliding scale   SubCutaneous at bedtime  labetalol 200 milliGRAM(s) Oral two times a day  montelukast 10 milliGRAM(s) Oral daily  polyethylene glycol 3350 17 Gram(s) Oral two times a day  senna 2 Tablet(s) Oral at bedtime  simvastatin 40 milliGRAM(s) Oral at bedtime    MEDICATIONS  (PRN):  acetaminophen     Tablet .. 650 milliGRAM(s) Oral every 6 hours PRN Temp greater or equal to 38C (100.4F), Mild Pain (1 - 3)  bisacodyl Suppository 10 milliGRAM(s) Rectal daily PRN Constipation  dextrose Oral Gel 15 Gram(s) Oral once PRN Blood Glucose LESS THAN 70 milliGRAM(s)/deciliter  hydrALAZINE Injectable 10 milliGRAM(s) IV Push every 6 hours PRN SBP >160  HYDROmorphone  Injectable 0.5 milliGRAM(s) IV Push every 4 hours PRN breakthrough pain  magnesium hydroxide Suspension 30 milliLiter(s) Oral daily PRN Constipation  mineral oil enema 133 milliLiter(s) Rectal daily PRN constipatoin  ondansetron Injectable 4 milliGRAM(s) IV Push every 8 hours PRN Nausea and/or Vomiting  oxyCODONE    IR 10 milliGRAM(s) Oral every 6 hours PRN Severe Pain (7 - 10)  oxyCODONE    IR 5 milliGRAM(s) Oral every 4 hours PRN Moderate Pain (4 - 6)      Allergies    shellfish (Swelling; Hives)  erythromycin (Vomiting)  lip swelling with lobster (Swelling)      Vital Signs Last 24 Hrs  T(C): 36.9 (25 Jul 2023 07:53), Max: 36.9 (24 Jul 2023 19:34)  T(F): 98.4 (25 Jul 2023 07:53), Max: 98.5 (25 Jul 2023 04:53)  HR: 69 (25 Jul 2023 07:53) (64 - 77)  BP: 149/72 (25 Jul 2023 07:53) (117/71 - 158/66)  BP(mean): 97 (24 Jul 2023 16:33) (97 - 97)  RR: 17 (25 Jul 2023 07:53) (16 - 187)  SpO2: 95% (25 Jul 2023 07:53) (93% - 100%)    Parameters below as of 25 Jul 2023 07:53  Patient On (Oxygen Delivery Method): room air        PHYSICAL EXAM:    GENERAL: NAD  CHEST/LUNG: Clear to ausculation bilaterall  HEART: Regular rate and rhythm; S1 S2;  ABDOMEN: Soft, Nontender,  Bowel sounds present  EXTREMITIES: no edema   NERVOUS SYSTEM:  Alert & Oriented X3, gen weakness    LABS:                        9.2    5.81  )-----------( 397      ( 24 Jul 2023 02:40 )             31.5     07-24    137  |  96  |  38.9<H>  ----------------------------<  136<H>  4.6   |  27.0  |  8.13<H>    Ca    8.9      24 Jul 2023 02:40        Urinalysis Basic - ( 24 Jul 2023 02:40 )    Color: x / Appearance: x / SG: x / pH: x  Gluc: 136 mg/dL / Ketone: x  / Bili: x / Urobili: x   Blood: x / Protein: x / Nitrite: x   Leuk Esterase: x / RBC: x / WBC x   Sq Epi: x / Non Sq Epi: x / Bacteria: x        CAPILLARY BLOOD GLUCOSE      POCT Blood Glucose.: 99 mg/dL (25 Jul 2023 08:19)  POCT Blood Glucose.: 122 mg/dL (25 Jul 2023 00:44)  POCT Blood Glucose.: 143 mg/dL (24 Jul 2023 23:03)  POCT Blood Glucose.: 105 mg/dL (24 Jul 2023 17:48)  POCT Blood Glucose.: 112 mg/dL (24 Jul 2023 12:13)        RADIOLOGY & ADDITIONAL TESTS:

## 2023-07-26 NOTE — DISCHARGE NOTE PROVIDER - ATTENDING DISCHARGE PHYSICAL EXAMINATION:
Vital Signs Last 24 Hrs  T(C): 36.6 (26 Jul 2023 12:13), Max: 36.9 (26 Jul 2023 08:04)  T(F): 97.8 (26 Jul 2023 12:13), Max: 98.4 (26 Jul 2023 08:04)  HR: 86 (26 Jul 2023 13:57) (69 - 90)  BP: 148/80 (26 Jul 2023 13:57) (95/55 - 178/72)  BP(mean): 107 (26 Jul 2023 04:02) (107 - 107)  RR: 16 (26 Jul 2023 13:57) (16 - 18)  SpO2: 96% (26 Jul 2023 13:57) (93% - 100%)    Parameters below as of 26 Jul 2023 13:57  Patient On (Oxygen Delivery Method): room air    PHYSICAL EXAM:  Constitutional: No acute distress, alert and oriented by 3  HEENT: AT/NC, EOMI, PERRLA, Normal conjunctiva, no pharyngeal erythema, moist oral mucosa  Respiratory: CTA BL, equal breath sounds, no crackles or wheezing  Cardiovascular: RRR, no edema  Gastrointestinal: soft, Non-tender, Non-distended + Bowel sounds, no rebound or guarding  Genitourinary: no steward  Musculoskeletal: rigtht knee swelling, incision healing  Neurological: CN 2-12 grossly intact, no focal deficits  Skin: warm, dry and intact  Psychiatric: normal mood and affect

## 2023-07-26 NOTE — DISCHARGE NOTE PROVIDER - NSDCMRMEDTOKEN_GEN_ALL_CORE_FT
acetaminophen 325 mg oral tablet: 2 tab(s) orally every 6 hours As needed Temp greater or equal to 38C (100.4F), Mild Pain (1 - 3)  Albuterol (Eqv-ProAir HFA) 90 mcg/inh inhalation aerosol: 2 puff(s) inhaled every 6 hours as needed for  shortness of breath and/or wheezing  albuterol 2.5 mg/3 mL (0.083%) inhalation solution: 3 milliliter(s) by nebulizer every 6 hours as needed for  shortness of breath and/or wheezing  Aspir 81 oral delayed release tablet: 1 tab(s) orally once a day  bisacodyl 10 mg rectal suppository: 1 suppository(ies) rectal once a day As needed Constipation  bisacodyl 10 mg rectal suppository: 1 suppository(ies) rectal once  folic acid 1 mg oral tablet: 1 tab(s) orally once a day  hydrALAZINE 50 mg oral tablet: 1 tab(s) orally 2 times a day  labetalol 200 mg oral tablet: 1 tab(s) orally 2 times a day  magnesium hydroxide 8% oral suspension: 30 milliliter(s) orally once a day As needed Constipation  oxyCODONE 5 mg oral tablet: 1 tab(s) orally every 4 hours As needed Moderate Pain (4 - 6)  Plavix 75 mg oral tablet: 1 tab(s) orally once a day  polyethylene glycol 3350 oral powder for reconstitution: 17 gram(s) orally 2 times a day  senna leaf extract oral tablet: 2 tab(s) orally once a day (at bedtime)  Singulair 10 mg oral tablet: 1 tab(s) orally once a day  Zocor 40 mg oral tablet: 1 tab(s) orally once a day (at bedtime)

## 2023-07-26 NOTE — DISCHARGE NOTE PROVIDER - HOSPITAL COURSE
67yoF hx ESRD on HD, CAD s/p CABG, HTN, DM, R-knee replacement on 6/27/2023 performed by Dr. Chico Simms at [facility] presenting with intractable knee pain.  Pt states she was discharged the following day after surgery to home with outpatient PT. Pt eventually developed severe knee pain for the past few weeks and went to follow up with Dr. Simms in mid July who ordered X ray that showed a fracture and pt was placed in a large brace to be worn for about 6 weeks and prescribed short course of oxycodone 5mg and 10mg PRN.  Pt came to [facility] due to persistent R-knee pain and is also reporting constipation. ED attending, Dr. Soriano spoke with orthopedic surgeon, Dr. Simms who performed surgery, who is aware of post-operative periprosthetic fracture, recommended hinge brace, NWB and GENESIS. RLE US shows small cystic structure, suspect post-surgical collection, probable small hematoma. Pain control with oxycodone 5mg/10mg PRN and IV dilaudid 0.5mg PRN. Patient seen by PT who rec GENESIS. Patient discharged to [facility] with HD.

## 2023-07-26 NOTE — ADVANCED PRACTICE NURSE CONSULT - RECOMMEDATIONS
·	Cavilon ADVANCED liquid skin barrier - daily  ·	If patient has profuse incontinence - Cleanse with normal saline, Pat dry, paint periwound with Cavilon liquid skin barrier, cover wound with XEROFORM nonadherent dressing, secure with Allevyn - date and initial dressing changes - (DO NOT PUT ALLEVYN DIRECTLY ONTO OPEN SKIN).    ·	Turn and Reposition Q2-4H  ·	Offload sacral-coccygeal area with positioner pillow, alternate sides Q2-4H  ·	Incontinence care with repositioning Q2-4H  ·	Apply Heel-Offloading cAIRboots or vertical pillows under each leg at all times while in bed. - provide skin checks and foot placement Q8H  ·	Green Waffle Seat cushion at all times when patient is out of bed in chair    -Continue turning/positioning patient from side-to-side q2h while in bed, q1h when/if OOB chair, or in accordance w/ pt's plan of care. Utilize pillows and/or Spry positioner pillow to assist w/ turning/positioning. When/if OOB chair, utilize pillows or chair cushion to offload pressure.   -Continue to offload heels from bed surface with soft pillow under calfs or by applying offloading boots to BLEs.    -Continue utilizing one underpad underneath patient to contain incontinence episodes; change pad when saturated/soiled.    -Continue nutrition consult for optimal wound healing & nutritional status.   -Assess skin/wound qshift, report changes to primary provider.     Plan of Care: Primary RN made aware of above recs. Spoke w/ provider, Dr. Queen in regards to above. No further needs/recs from [facility] service at this time. Staff RN to perform routine skin/wound assessment and manage wound care. Questions or concerns or if wound worsens and reconsult needed, please contact CN.

## 2023-07-26 NOTE — DISCHARGE NOTE NURSING/CASE MANAGEMENT/SOCIAL WORK - NSDCPEFALRISK_GEN_ALL_CORE
For information on Fall & Injury Prevention, visit: [email or web address] OR  [email or web address] OR  https://www.cdc.gov/steadi/patient.html

## 2023-08-05 ENCOUNTER — EMERGENCY (EMERGENCY)
Facility: HOSPITAL | Age: 68
LOS: 1 days | Discharge: OTHER INSTITUTION | End: 2023-08-05
Attending: EMERGENCY MEDICINE
Payer: MEDICARE

## 2023-08-05 VITALS
TEMPERATURE: 99 F | RESPIRATION RATE: 18 BRPM | SYSTOLIC BLOOD PRESSURE: 164 MMHG | OXYGEN SATURATION: 100 % | DIASTOLIC BLOOD PRESSURE: 70 MMHG | HEART RATE: 64 BPM

## 2023-08-05 VITALS
RESPIRATION RATE: 16 BRPM | HEIGHT: 67 IN | OXYGEN SATURATION: 99 % | HEART RATE: 76 BPM | DIASTOLIC BLOOD PRESSURE: 54 MMHG | SYSTOLIC BLOOD PRESSURE: 139 MMHG | TEMPERATURE: 98 F

## 2023-08-05 DIAGNOSIS — Z95.1 PRESENCE OF AORTOCORONARY BYPASS GRAFT: Chronic | ICD-10-CM

## 2023-08-05 DIAGNOSIS — Z95.5 PRESENCE OF CORONARY ANGIOPLASTY IMPLANT AND GRAFT: Chronic | ICD-10-CM

## 2023-08-05 DIAGNOSIS — Z98.49 CATARACT EXTRACTION STATUS, UNSPECIFIED EYE: Chronic | ICD-10-CM

## 2023-08-05 DIAGNOSIS — Z99.2 DEPENDENCE ON RENAL DIALYSIS: Chronic | ICD-10-CM

## 2023-08-05 DIAGNOSIS — Z90.49 ACQUIRED ABSENCE OF OTHER SPECIFIED PARTS OF DIGESTIVE TRACT: Chronic | ICD-10-CM

## 2023-08-05 DIAGNOSIS — Z98.89 OTHER SPECIFIED POSTPROCEDURAL STATES: Chronic | ICD-10-CM

## 2023-08-05 PROCEDURE — 73562 X-RAY EXAM OF KNEE 3: CPT | Mod: 26,RT

## 2023-08-05 PROCEDURE — 99285 EMERGENCY DEPT VISIT HI MDM: CPT | Mod: GC

## 2023-08-05 PROCEDURE — 73562 X-RAY EXAM OF KNEE 3: CPT

## 2023-08-05 PROCEDURE — 99283 EMERGENCY DEPT VISIT LOW MDM: CPT

## 2023-08-05 PROCEDURE — 99285 EMERGENCY DEPT VISIT HI MDM: CPT

## 2023-08-05 NOTE — CONSULT NOTE ADULT - NS ATTEND AMEND GEN_ALL_CORE FT
Patient with distal femur fracture with increasing pain after event this am and presented to ER for further eval.  Patient with known fracture and treating with private ortho.  Patient to continue NWB and knee immobilizer and will follow up as outpatient.

## 2023-08-05 NOTE — PROGRESS NOTE ADULT - SUBJECTIVE AND OBJECTIVE BOX
Pt Name: EULOGIO NIELSEN  MRN: [id number]      Patient is a being followed for right total knee arthroplasty periprosthetic fracture, in KI. Patient seen and examined, doing well. Pain controlled. Denies CP, SOB. Patient denies motor or sensory changes. No other orthopedic complaint       PAST MEDICAL & SURGICAL HISTORY:  PAST MEDICAL & SURGICAL HISTORY:  CAD (coronary artery disease)      CKD (chronic kidney disease) requiring chronic dialysis      Type 2 diabetes mellitus      Diabetic retinopathy associated with diabetes mellitus due to underlying condition      Hypertension      HLD (hyperlipidemia)      CVA (cerebral vascular accident)        Asthma      S/P CABG (coronary artery bypass graft)      S/P coronary artery stent placement  (1)       S/P cataract extraction  OS with retained lens fragment 7/15/15      Acute hemodialysis patient  nothing in the left arm awaiting AV fistula placement - temporary cath in right upper chest quadrant      S/P  section      S/P cholecystectomy          Allergies: lip swelling with lobster (Swelling)  shellfish (Swelling; Hives)  erythromycin (Vomiting)          PHYSICAL EXAM:    Vital Signs Last 24 Hrs  T(C): 36.8 (05 Aug 2023 08:28), Max: 36.8 (05 Aug 2023 02:55)  T(F): 98.2 (05 Aug 2023 08:28), Max: 98.3 (05 Aug 2023 02:55)  HR: 71 (05 Aug 2023 08:28) (71 - 76)  BP: 137/65 (05 Aug 2023 08:28) (137/65 - 139/54)  BP(mean): --  RR: 18 (05 Aug 2023 08:28) (16 - 18)  SpO2: 100% (05 Aug 2023 08:28) (99% - 100%)    Parameters below as of 05 Aug 2023 08:28  Patient On (Oxygen Delivery Method): room air      Daily Height in cm: 170.18 (05 Aug 2023 02:55)    Daily     Appearance: Alert, responsive, in no acute distress.  Musculoskeletal:    RLE:  Skin: no rash on visible skin. Skin is clean, dry and intact. No bleeding. No abrasions. No ulcerations. Incision healing well. No drainage or discharge. +TTP knee  Neurological: Sensation is grossly intact to light touch.   motor: +DF/PF/EHL/FHL No focal deficits or weaknesses found.  Vascular: 2+ distal pulses. Cap refill < 2 sec. No signs of venous insufficiency or stasis. No extremity ulcerations. No cyanosis.    SPLINTING   PROCEDURE NOTE: Splinting    Performed by: Az Bajwa PA-C     Indication: right knee periprosthetic fracture  The RLE was appropriately positioned. A posterior splint was applied. Distally, the extremity was neurovascular intact following the procedure. The patient tolerated the procedure well.      A/P:  Pt is a 67y Female with right total knee arthroplasty periprosthetic fracture, now with a posterior splint     PLAN:   - NWB RLE   - Posterior splint   - Continue splint as applied  - Pain control  - Final plan pending  Pt Name: EULOGIO NIELSEN  MRN: [id number]      Patient is a being followed for right total knee arthroplasty periprosthetic fracture, in KI. Patient seen and examined, doing well. Pain controlled. Denies CP, SOB. Patient denies motor or sensory changes. No other orthopedic complaint       PAST MEDICAL & SURGICAL HISTORY:  PAST MEDICAL & SURGICAL HISTORY:  CAD (coronary artery disease)      CKD (chronic kidney disease) requiring chronic dialysis      Type 2 diabetes mellitus      Diabetic retinopathy associated with diabetes mellitus due to underlying condition      Hypertension      HLD (hyperlipidemia)      CVA (cerebral vascular accident)        Asthma      S/P CABG (coronary artery bypass graft)      S/P coronary artery stent placement  (1)       S/P cataract extraction  OS with retained lens fragment 7/15/15      Acute hemodialysis patient  nothing in the left arm awaiting AV fistula placement - temporary cath in right upper chest quadrant      S/P  section      S/P cholecystectomy          Allergies: lip swelling with lobster (Swelling)  shellfish (Swelling; Hives)  erythromycin (Vomiting)          PHYSICAL EXAM:    Vital Signs Last 24 Hrs  T(C): 36.8 (05 Aug 2023 08:28), Max: 36.8 (05 Aug 2023 02:55)  T(F): 98.2 (05 Aug 2023 08:28), Max: 98.3 (05 Aug 2023 02:55)  HR: 71 (05 Aug 2023 08:28) (71 - 76)  BP: 137/65 (05 Aug 2023 08:28) (137/65 - 139/54)  BP(mean): --  RR: 18 (05 Aug 2023 08:28) (16 - 18)  SpO2: 100% (05 Aug 2023 08:28) (99% - 100%)    Parameters below as of 05 Aug 2023 08:28  Patient On (Oxygen Delivery Method): room air      Daily Height in cm: 170.18 (05 Aug 2023 02:55)    Daily     Appearance: Alert, responsive, in no acute distress.  Musculoskeletal:    RLE:  Skin: no rash on visible skin. Skin is clean, dry and intact. No bleeding. No abrasions. No ulcerations. Incision healing well. No drainage or discharge. +TTP knee  Neurological: Sensation is grossly intact to light touch.   motor: +DF/PF/EHL/FHL No focal deficits or weaknesses found.  Vascular: 2+ distal pulses. Cap refill < 2 sec. No signs of venous insufficiency or stasis. No extremity ulcerations. No cyanosis.    SPLINTING   PROCEDURE NOTE: Splinting    Performed by: Az Bajwa PA-C     Indication: right knee periprosthetic fracture  The RLE was appropriately positioned. A posterior splint was applied. Distally, the extremity was neurovascular intact following the procedure. The patient tolerated the procedure well.      A/P:  Pt is a 67y Female with right total knee arthroplasty periprosthetic fracture, now with a posterior splint     PLAN:   - NWB RLE   - Posterior splint   - Continue splint as applied  - Pain control  - Transfer to Dr. Simms at [facility]

## 2023-08-05 NOTE — ED ADULT NURSE REASSESSMENT NOTE - NS ED NURSE REASSESS COMMENT FT1
patient received from PM RN; care assumed at this time. patient resting in hospital stretcher reports her pain has partially been relieved and she is comfortable at this time. patient alert and oriented x, breathing even and unlabored. r/ leg immobilizer in place

## 2023-08-05 NOTE — ED PROVIDER NOTE - MDM ORDERS SUBMITTED SELECTION
[facility]  Referral was made to [facility] for home PT and OT and was accepted  They were informed that the patient will be staying at her daughter's house at [facility] [address], [place], [address]   Patient has elected to secure a RW on her own  DME [facility] was wheelchair and platform commode  Patient financial responsibility for wheelchair rental is $37 10 initial and $10 monthly after  Platform attachment for walker is $56 self pay  Patient was made aware of all of the above and stated that she was agreeable to co-pays  Imaging Studies/Medications

## 2023-08-05 NOTE — CONSULT NOTE ADULT - SUBJECTIVE AND OBJECTIVE BOX
Pt Name: EULOGIO NIELSEN  MRN: [id number]      Patient is a 67y Female presenting to the emergency department with a chief complaint of right knee pain. Patient reports that she had a total knee replacement with Dr. Simms in 2023. Post op course complicated with periprosthetic fracture after fall. Patient was seen by Dr. Simms after fall and was treated non op, nonweight bearing of the RLE. Patient reports that she was transferring from wheel chair to bed this morning and felt discomfort in knee and came to ER for eval. Denies acute sensory/motor changes. no other orthopedic complaints at this time. No new trauma since initial fall.       REVIEW OF SYSTEMS  General: Alert, responsive, in NAD  Skin/Breast: No rashes, no pruritis 	  Ophthalmologic: No visual changes. No redness. 	  ENMT:	No discharge. No swelling.  Respiratory and Thorax: No difficulty breathing. No cough.	   Cardiovascular:	No chest pain. No palpitations.  Gastrointestinal:	 No abdominal pain. No diarrhea.   Genitourinary: No dysuria. No bleeding.  Musculoskeletal: SEE HPI.  Neurological: No sensory or motor changes.   ROS is otherwise negative.    PAST MEDICAL & SURGICAL HISTORY:  PAST MEDICAL & SURGICAL HISTORY:  CAD (coronary artery disease)  CKD (chronic kidney disease) requiring chronic dialysi  Type 2 diabetes mellitus  Diabetic retinopathy associated with diabetes mellitus due to underlying condition  Hypertension  HLD (hyperlipidemia)  CVA (cerebral vascular accident)    Asthma  S/P CABG (coronary artery bypass graft)  S/P coronary artery stent placement  (1)   S/P cataract extraction  OS with retained lens fragment 7/15/15  Acute hemodialysis patient  nothing in the left arm awaiting AV fistula placement - temporary cath in right upper chest quadrant  S/P  section  S/P cholecystectomy          Allergies: lip swelling with lobster (Swelling)  shellfish (Swelling; Hives)  erythromycin (Vomiting)      Medications:     FAMILY HISTORY:  No pertinent family history in first degree relatives    : non-contributory    Social History:         Vital Signs Last 24 Hrs  T(C): 36.8 (05 Aug 2023 02:55), Max: 36.8 (05 Aug 2023 02:55)  T(F): 98.3 (05 Aug 2023 02:55), Max: 98.3 (05 Aug 2023 02:55)  HR: 76 (05 Aug 2023 02:55) (76 - 76)  BP: 139/54 (05 Aug 2023 02:55) (139/54 - 139/54)  BP(mean): --  RR: 16 (05 Aug 2023 02:55) (16 - 16)  SpO2: 99% (05 Aug 2023 02:55) (99% - 99%)    Parameters below as of 05 Aug 2023 02:55  Patient On (Oxygen Delivery Method): room air        Daily Height in cm: 170.18 (05 Aug 2023 02:55)    Daily       PHYSICAL EXAM:  Appearance: Alert, responsive, in no acute distress.    RLE:  Skin: no rash on visible skin. Skin is clean, dry and intact. No bleeding. No abrasions. No ulcerations. Incision healing well. No drainage or discharge. +TTP knee  Neurological: Sensation is grossly intact to light touch.   motor: +DF/PF/EHL/FHL No focal deficits or weaknesses found.  Vascular: 2+ distal pulses. Cap refill < 2 sec. No signs of venous insufficiency or stasis. No extremity ulcerations. No cyanosis.    Imaging Studies:    A/P:  Pt is a 67y Female with right tka periprosthetic fracture      PLAN:   * KI in place  * Pain control  * NWB RLE  * will update note

## 2023-08-05 NOTE — ED PROVIDER NOTE - PROGRESS NOTE DETAILS
Waqas: X-rays reviewed. Case discussed with ortho team; they will contact pt's primary surgeon Dr. Renee to determine final recommendations.

## 2023-08-05 NOTE — ED ADULT TRIAGE NOTE - CHIEF COMPLAINT QUOTE
right knee pain, twisted it while attempting to stand up, had knee immobilizer on and took off prior, no fall,

## 2023-08-05 NOTE — ED PROVIDER NOTE - ATTENDING CONTRIBUTION TO CARE
67y F w/ hx ESRD on HD, CAD s/p CABG, HTN, DM, [facility]. Pt just had R knee replacement done on 6/27 by Dr. Flores at [place]; afterward was diagnosed with periprosthetic fracture on 7/17 and was advised to be in knee brace for 6 weeks. Pt was later admitted to this hospital and discharged to rehab. Pt says that she had finished PT at rehab yesterday, and had taken off her brace. She went to transfer from wheelchair to bed when she felt increased pain in her R knee. Denies any fall. Was given oxycodone and motrin at rehab without relief. Last completed dialysis yesterday. On exam, pt in no acute distress. +Swelling and tenderness to R knee over medial aspect, no obvious deformity, distal neurosensory exam intact. Limited ROM 2/2 pain and swelling. Surgical incisions appear clean/dry/intact. Will treat for pain, obtain repeat X-rays, reassess.

## 2023-08-05 NOTE — ED ADULT NURSE NOTE - OBJECTIVE STATEMENT
assumed care of pt from stephanie, pt AAOX3, resp. even and unlabored on RA, pt states she was getting up from her wheelchair to get back into bed when she hit her knee on the bed, pt denies fall, denies LOC, pt had a RT knee replacement on 06/27/23, pt states she saw the surgeon this week after hitting it another time previously and he told her she has a patella fx, pt endorses she is due to followup on 08/31/23, pt currently in rehab at [facility], pos. swelling to RT knee, pos. hematoma to right shin

## 2023-08-05 NOTE — ED ADULT NURSE NOTE - NS ED NOTE ABUSE RESPONSE YN
Patient Education        Please decrease Brandon's basal insulin overnight by 30%. Please call endocrinology tomorrow or return with worsening symptoms. Nausea and Vomiting in Children: Care Instructions  Your Care Instructions    Most of the time, nausea and vomiting in children is not serious. It often is caused by a viral stomach flu. A child with the stomach flu also may have other symptoms. These may include diarrhea, fever, and stomach cramps. With home treatment, the vomiting will likely stop within 12 hours. Diarrhea may last for a few days or more. In most cases, home treatment will ease nausea and vomiting. With babies, vomiting should not be confused with spitting up. Vomiting is forceful. The child often keeps vomiting. And he or she may feel some pain. Spitting up may seem forceful. But it often occurs shortly after feeding. And it doesn't continue. Spitting up is effortless. The doctor has checked your child carefully, but problems can develop later. If you notice any problems or new symptoms, get medical treatment right away. Follow-up care is a key part of your child's treatment and safety. Be sure to make and go to all appointments, and call your doctor if your child is having problems. It's also a good idea to know your child's test results and keep a list of the medicines your child takes. How can you care for your child at home? [place] to 6 months  · Be sure to watch your baby closely for dehydration. These signs include sunken eyes with few tears, a dry mouth with little or no spit, and no wet diapers for 6 hours. · Do not give your baby plain water. · If your baby is , keep breastfeeding. Offer each breast to your baby for 1 to 2 minutes every 10 minutes. · If your baby still isn't getting enough fluids from the breast or from formula, ask your doctor if you need to use an oral rehydration solution (ORS). Examples are Pedialyte and Infalyte.  These drinks contain a mix of salt, sugar, and minerals. You can buy them at drugstores or grocery stores. · The amount of ORS your baby needs depends on your baby's age and size. You can give the ORS in a dropper, spoon, or bottle. · Do not give your child over-the-counter antidiarrhea or upset-stomach medicines without talking to your doctor first. Arnold Rist not give Pepto-Bismol or other medicines that contain salicylates, a form of aspirin, or aspirin. Aspirin has been linked to Reye syndrome, a serious illness. 7 months to 3 years  · Offer your child small sips of water. Let your child drink as much as he or she wants. · Ask your doctor if your child needs an oral rehydration solution (ORS) such as Pedialyte or Infalyte. These drinks contain a mix of salt, sugar, and minerals. You can buy them at drugstores or grocery stores. · Slowly start to offer your child regular foods after 6 hours with no vomiting.  ? Offer your child solid foods if he or she usually eats solid foods. ? Allow your child to eat small amounts of what he or she prefers. ? Avoid high-fiber foods, such as beans. And avoid foods with a lot of sugar, such as candy or ice cream.  · Do not give your child over-the-counter antidiarrhea or upset-stomach medicines without talking to your doctor first. Arnold Rist not give Pepto-Bismol or other medicines that contain salicylates, a form of aspirin, or aspirin. Aspirin has been linked to Reye syndrome, a serious illness. Over 3 years  · Watch for and treat signs of dehydration, which means that the body has lost too much water. Your child's mouth may feel very dry. He or she may have sunken eyes with few tears when crying. Your child may lack energy and want to be held a lot. He or she may not urinate as often as usual.  · Offer your child small sips of water. Let your child drink as much as he or she wants. · Ask your doctor if your child needs an oral rehydration solution (ORS) such as Pedialyte or Infalyte.  These drinks contain a mix of salt, sugar, and minerals. You can buy them at drugstores or grocery stores. · Have your child rest in bed until he or she feels better. · When your child is feeling better, offer the type of food he or she usually eats. Avoid high-fiber foods, such as beans. And avoid foods with a lot of sugar, such as candy or ice cream.  · Do not give your child over-the-counter antidiarrhea or upset-stomach medicines without talking to your doctor first. Jerry Ge not give Pepto-Bismol or other medicines that contain salicylates, a form of aspirin, or aspirin. Aspirin has been linked to Reye syndrome, a serious illness. When should you call for help? Call 911 anytime you think your child may need emergency care. For example, call if:    · Your child passes out (loses consciousness).     · Your child seems very sick or is hard to wake up.   [facility] your doctor now or seek immediate medical care if:    · Your child has new or worse belly pain.     · Your child has a fever with a stiff neck or a severe headache.     · Your child has signs of needing more fluids. These signs include sunken eyes with few tears, a dry mouth with little or no spit, and little or no urine for 6 hours.     · Your child vomits blood or what looks like coffee grounds.     · Your child's vomiting gets worse.    Watch closely for changes in your child's health, and be sure to contact your doctor if:    · The vomiting is not better in 1 day (24 hours).     · Your child does not get better as expected. Where can you learn more? Go to http://rosalinda-thom.info/. Enter M674 in the search box to learn more about \"Nausea and Vomiting in Children: Care Instructions. \"  Current as of: September 23, 2018  Content Version: 11.9  © 7954-0548 [employer]. Care instructions adapted under license by [employer] (which disclaims liability or warranty for this information).  If you have questions about a medical condition or this instruction, always ask your healthcare professional. [place] [address] any warranty or liability for your use of this information. Patient Education        Learning About Low Blood Sugar (Hypoglycemia) in Diabetes  What is low blood sugar (hypoglycemia)? Hypoglycemia means that your blood sugar is low and your body (especially your brain) is not getting enough fuel. If you have diabetes, your blood sugar can go too low if you take too much of some diabetes medicines. It can also go too low if you miss a meal. And it can happen if you exercise too hard without eating enough food. Some medicines used to treat other health problems can cause low blood sugar too. What are the symptoms? Symptoms of low blood sugar can start quickly. It may take just 10 to 15 minutes. If you have had diabetes for many years, you may not realize that your blood sugar is low until it drops very low. · If your blood sugar level drops below 70 (mild low blood sugar), you may feel tired, anxious, dizzy, weak, shaky, or sweaty. You may have a fast heartbeat or blurry vision. · If your blood sugar level continues to drop (usually below 40), your behavior may change. You may feel more irritable. You may find it hard to concentrate or talk. And you may feel unsteady when you stand or walk. You may become too weak or confused to eat something with sugar to raise your blood sugar level. · If your blood sugar level drops very low (usually below 20), you may pass out (lose consciousness). Or you may have a seizure or stroke. If you have symptoms of severe low blood sugar, you need to get medical care right away. If you had a low blood sugar level during the night, you may wake up tired or with a headache. Or you may sweat so much during the night that your pajamas or sheets are damp when you wake up. How is low blood sugar treated?   You can treat low blood sugar by eating or drinking something that has 15 grams of carbohydrate. These should be quick-sugar foods. Check your blood sugar level again 15 minutes after having a quick-sugar food to make sure your level is getting back to your target range. Here are examples of quick-sugar foods that have 15 grams of carbohydrate:  · 3 to 4 glucose tablets  · 1 tube of glucose gel  · Hard candy (such as 3 Jolly Ranchers or 5 to 7 Life Savers)  · 1 tablespoon honey  · 2 tablespoons of raisins  · ½ cup to ¾ cup (4 to 6 ounces) of fruit juice or regular (not diet) soda  · 1 tablespoon of sugar  · 1 cup of fat-free milk  If you have problems with severe low blood sugar, someone else may have to give you a shot of glucagon. This is a hormone that raises blood sugar levels quickly. How can you prevent low blood sugar? You can take steps to prevent low blood sugar. · Follow your treatment plan. Take your insulin or other diabetes medicine exactly as your doctor prescribed it. Talk with your doctor if you're having low blood sugar often. Your medicine may need to be adjusted if it's causing your low blood sugar. · Check your blood sugar levels often. This helps you find early changes before an emergency happens. · Keep a quick-sugar food with you in case your blood sugar level drops low. · Eat small meals more often so that you don't get too hungry between meals. Don't skip meals. · Balance extra exercise with eating more. Check your blood sugar and learn how it changes after exercise. If your blood sugar stays at a normal level, you may not need to eat after you exercise. · Limit how much alcohol you drink. Alcohol can make low blood sugar go even lower. Don't drink alcohol if you have problems recognizing the early signs of low blood sugar. · Keep a diary of your symptoms. This helps you learn when changes in your body may signal low blood sugar. And keep track of how often you have low blood sugar, including when you last ate and what you ate.  This will help you learn what causes your blood sugar to drop. · Learn about diabetes and low blood sugar. Support groups or a diabetes education center can help you understand how medicines, diet, and exercise affect your blood sugar levels. Since low blood sugar levels can quickly become an emergency, be sure to wear medical alert jewelry, such as a medical alert bracelet. This is to let people know you have diabetes so they can get help for you. You can buy this at most drugstores. And make sure your family, friends, and coworkers know the symptoms of low blood sugar. Teach them what to do to get your sugar level up. Follow-up care is a key part of your treatment and safety. Be sure to make and go to all appointments, and call your doctor if you are having problems. It's also a good idea to know your test results and keep a list of the medicines you take. Where can you learn more? Go to http://rosalinda-thom.info/. Enter D762 in the search box to learn more about \"Learning About Low Blood Sugar (Hypoglycemia) in Diabetes. \"  Current as of: July 25, 2018  Content Version: 11.9  © 0767-7251 [employer]. Care instructions adapted under license by [employer] (which disclaims liability or warranty for this information). If you have questions about a medical condition or this instruction, always ask your healthcare professional. [address] any warranty or liability for your use of this information. Yes

## 2023-08-05 NOTE — ED PROVIDER NOTE - PHYSICAL EXAMINATION
Gen: Well appearing in NAD  Head: NC/AT  Neck: trachea midline  Card: regular rate and rhythm  Resp:  CTAB  Abd: soft, non-distended, non-tender  Ext: TTP R lateral knee, reduced ROM 2/2 pain  Neuro:  A&O, no motor or sensory deficits above reported baseline  Skin:  Warm and dry as visualized  Psych:  Normal affect and mood

## 2023-08-05 NOTE — ED ADULT NURSE REASSESSMENT NOTE - NS ED NURSE REASSESS COMMENT FT1
Pt resting comfortably in stretcher, pt AAOX3, resp even and unlabored on RA, pt endorses relief after pain med administration, pt waiting for ortho consult, pt educated about plan of care, pt demonstrates understanding through teach back method, safety maintained

## 2023-08-05 NOTE — ED PROVIDER NOTE - OBJECTIVE STATEMENT
68 y/o F pt w/ PMHx of ESRD on dialysis Tues, Thurs, Sat presents w/ R knee pain. Pt has known periprosthetic fx and is at rehab. She is supposed to wear a brace but took it off when trying to get into bed this evening. pt had sudden onset of R knee pain on the medial side. She did not fall or hit her knee on anything. She denies fever, chills, numbness, tingling, focal weakness, or any other complaints.

## 2023-08-05 NOTE — ED ADULT NURSE NOTE - NSFALLHARMRISKINTERV_ED_ALL_ED

## 2023-09-05 NOTE — CONSULT NOTE ADULT - CONSULT REASON
Resumption of Hemodialysis Cephalexin Counseling: I counseled the patient regarding use of cephalexin as an antibiotic for prophylactic and/or therapeutic purposes. Cephalexin (commonly prescribed under brand name Keflex) is a cephalosporin antibiotic which is active against numerous classes of bacteria, including most skin bacteria. Side effects may include nausea, diarrhea, gastrointestinal upset, rash, hives, yeast infections, and in rare cases, hepatitis, kidney disease, seizures, fever, confusion, neurologic symptoms, and others. Patients with severe allergies to penicillin medications are cautioned that there is about a 10% incidence of cross-reactivity with cephalosporins. When possible, patients with penicillin allergies should use alternatives to cephalosporins for antibiotic therapy.

## 2025-01-13 ENCOUNTER — APPOINTMENT (OUTPATIENT)
Dept: ORTHOPEDIC SURGERY | Facility: CLINIC | Age: 70
End: 2025-01-13
Payer: MEDICARE

## 2025-01-13 VITALS
HEIGHT: 67 IN | DIASTOLIC BLOOD PRESSURE: 84 MMHG | BODY MASS INDEX: 26.68 KG/M2 | SYSTOLIC BLOOD PRESSURE: 142 MMHG | HEART RATE: 72 BPM | WEIGHT: 170 LBS

## 2025-01-13 DIAGNOSIS — Z96.651 PRESENCE OF RIGHT ARTIFICIAL KNEE JOINT: ICD-10-CM

## 2025-01-13 DIAGNOSIS — S83.004A UNSPECIFIED DISLOCATION OF RIGHT PATELLA, INITIAL ENCOUNTER: ICD-10-CM

## 2025-01-13 DIAGNOSIS — T84.012A BROKEN INTERNAL RIGHT KNEE PROSTHESIS, INITIAL ENCOUNTER: ICD-10-CM

## 2025-01-13 PROCEDURE — G2211 COMPLEX E/M VISIT ADD ON: CPT

## 2025-01-13 PROCEDURE — 73562 X-RAY EXAM OF KNEE 3: CPT | Mod: RT

## 2025-01-13 PROCEDURE — 99205 OFFICE O/P NEW HI 60 MIN: CPT

## 2025-02-05 ENCOUNTER — APPOINTMENT (OUTPATIENT)
Age: 70
End: 2025-02-05
Payer: MEDICARE

## 2025-02-05 VITALS
HEIGHT: 67 IN | DIASTOLIC BLOOD PRESSURE: 52 MMHG | BODY MASS INDEX: 26.68 KG/M2 | SYSTOLIC BLOOD PRESSURE: 91 MMHG | WEIGHT: 170 LBS

## 2025-02-05 DIAGNOSIS — T84.012A BROKEN INTERNAL RIGHT KNEE PROSTHESIS, INITIAL ENCOUNTER: ICD-10-CM

## 2025-02-05 DIAGNOSIS — Z79.01 LONG TERM (CURRENT) USE OF ANTICOAGULANTS: ICD-10-CM

## 2025-02-05 DIAGNOSIS — S83.004A UNSPECIFIED DISLOCATION OF RIGHT PATELLA, INITIAL ENCOUNTER: ICD-10-CM

## 2025-02-05 DIAGNOSIS — Z96.651 PRESENCE OF RIGHT ARTIFICIAL KNEE JOINT: ICD-10-CM

## 2025-02-05 DIAGNOSIS — E11.9 TYPE 2 DIABETES MELLITUS W/OUT COMPLICATIONS: ICD-10-CM

## 2025-02-05 PROCEDURE — 99215 OFFICE O/P EST HI 40 MIN: CPT | Mod: 25

## 2025-02-05 PROCEDURE — 20610 DRAIN/INJ JOINT/BURSA W/O US: CPT | Mod: RT

## 2025-02-15 ENCOUNTER — INPATIENT (INPATIENT)
Facility: HOSPITAL | Age: 70
LOS: 16 days | Discharge: NURSING, REHAB OR LONG-TERM CARE | DRG: 552 | End: 2025-03-04
Attending: STUDENT IN AN ORGANIZED HEALTH CARE EDUCATION/TRAINING PROGRAM | Admitting: HOSPITALIST
Payer: MEDICARE

## 2025-02-15 VITALS
TEMPERATURE: 98 F | DIASTOLIC BLOOD PRESSURE: 91 MMHG | RESPIRATION RATE: 16 BRPM | OXYGEN SATURATION: 95 % | SYSTOLIC BLOOD PRESSURE: 189 MMHG | HEART RATE: 70 BPM

## 2025-02-15 DIAGNOSIS — Z95.1 PRESENCE OF AORTOCORONARY BYPASS GRAFT: Chronic | ICD-10-CM

## 2025-02-15 DIAGNOSIS — Z99.2 DEPENDENCE ON RENAL DIALYSIS: Chronic | ICD-10-CM

## 2025-02-15 DIAGNOSIS — Z95.5 PRESENCE OF CORONARY ANGIOPLASTY IMPLANT AND GRAFT: Chronic | ICD-10-CM

## 2025-02-15 DIAGNOSIS — Z98.89 OTHER SPECIFIED POSTPROCEDURAL STATES: Chronic | ICD-10-CM

## 2025-02-15 DIAGNOSIS — Z90.49 ACQUIRED ABSENCE OF OTHER SPECIFIED PARTS OF DIGESTIVE TRACT: Chronic | ICD-10-CM

## 2025-02-15 DIAGNOSIS — Z98.49 CATARACT EXTRACTION STATUS, UNSPECIFIED EYE: Chronic | ICD-10-CM

## 2025-02-15 LAB
ANION GAP SERPL CALC-SCNC: 14 MMOL/L — SIGNIFICANT CHANGE UP (ref 5–17)
BASOPHILS # BLD AUTO: 0.02 K/UL — SIGNIFICANT CHANGE UP (ref 0–0.2)
BASOPHILS NFR BLD AUTO: 0.4 % — SIGNIFICANT CHANGE UP (ref 0–2)
BUN SERPL-MCNC: 33.7 MG/DL — HIGH (ref 8–20)
CALCIUM SERPL-MCNC: 8.5 MG/DL — SIGNIFICANT CHANGE UP (ref 8.4–10.5)
CHLORIDE SERPL-SCNC: 94 MMOL/L — LOW (ref 96–108)
CO2 SERPL-SCNC: 27 MMOL/L — SIGNIFICANT CHANGE UP (ref 22–29)
CREAT SERPL-MCNC: 6.37 MG/DL — HIGH (ref 0.5–1.3)
CRP SERPL-MCNC: 10 MG/L — HIGH
EGFR: 7 ML/MIN/1.73M2 — LOW
EGFR: 7 ML/MIN/1.73M2 — LOW
EOSINOPHIL # BLD AUTO: 0.16 K/UL — SIGNIFICANT CHANGE UP (ref 0–0.5)
EOSINOPHIL NFR BLD AUTO: 3.4 % — SIGNIFICANT CHANGE UP (ref 0–6)
ERYTHROCYTE [SEDIMENTATION RATE] IN BLOOD: 60 MM/HR — HIGH (ref 0–20)
FLUAV AG NPH QL: SIGNIFICANT CHANGE UP
FLUBV AG NPH QL: SIGNIFICANT CHANGE UP
GLUCOSE SERPL-MCNC: 196 MG/DL — HIGH (ref 70–99)
HCT VFR BLD CALC: 33.3 % — LOW (ref 34.5–45)
HGB BLD-MCNC: 9.9 G/DL — LOW (ref 11.5–15.5)
IMM GRANULOCYTES # BLD AUTO: 0.02 K/UL — SIGNIFICANT CHANGE UP (ref 0–0.07)
IMM GRANULOCYTES NFR BLD AUTO: 0.4 % — SIGNIFICANT CHANGE UP (ref 0–0.9)
LYMPHOCYTES # BLD AUTO: 1.33 K/UL — SIGNIFICANT CHANGE UP (ref 1–3.3)
LYMPHOCYTES NFR BLD AUTO: 28.1 % — SIGNIFICANT CHANGE UP (ref 13–44)
MCHC RBC-ENTMCNC: 27.4 PG — SIGNIFICANT CHANGE UP (ref 27–34)
MCHC RBC-ENTMCNC: 29.7 G/DL — LOW (ref 32–36)
MCV RBC AUTO: 92.2 FL — SIGNIFICANT CHANGE UP (ref 80–100)
MONOCYTES # BLD AUTO: 0.43 K/UL — SIGNIFICANT CHANGE UP (ref 0–0.9)
MONOCYTES NFR BLD AUTO: 9.1 % — SIGNIFICANT CHANGE UP (ref 2–14)
NEUTROPHILS # BLD AUTO: 2.77 K/UL — SIGNIFICANT CHANGE UP (ref 1.8–7.4)
NEUTROPHILS NFR BLD AUTO: 58.6 % — SIGNIFICANT CHANGE UP (ref 43–77)
NRBC # BLD AUTO: 0 K/UL — SIGNIFICANT CHANGE UP (ref 0–0)
NRBC # FLD: 0 K/UL — SIGNIFICANT CHANGE UP (ref 0–0)
NRBC BLD AUTO-RTO: 0 /100 WBCS — SIGNIFICANT CHANGE UP (ref 0–0)
PLATELET # BLD AUTO: 330 K/UL — SIGNIFICANT CHANGE UP (ref 150–400)
PMV BLD: 10.9 FL — SIGNIFICANT CHANGE UP (ref 7–13)
POTASSIUM SERPL-MCNC: 4.4 MMOL/L — SIGNIFICANT CHANGE UP (ref 3.5–5.3)
POTASSIUM SERPL-SCNC: 4.4 MMOL/L — SIGNIFICANT CHANGE UP (ref 3.5–5.3)
RBC # BLD: 3.61 M/UL — LOW (ref 3.8–5.2)
RBC # FLD: 14.9 % — HIGH (ref 10.3–14.5)
RSV RNA NPH QL NAA+NON-PROBE: SIGNIFICANT CHANGE UP
SARS-COV-2 RNA SPEC QL NAA+PROBE: SIGNIFICANT CHANGE UP
SODIUM SERPL-SCNC: 135 MMOL/L — SIGNIFICANT CHANGE UP (ref 135–145)
WBC # BLD: 4.73 K/UL — SIGNIFICANT CHANGE UP (ref 3.8–10.5)
WBC # FLD AUTO: 4.73 K/UL — SIGNIFICANT CHANGE UP (ref 3.8–10.5)

## 2025-02-15 PROCEDURE — 99285 EMERGENCY DEPT VISIT HI MDM: CPT

## 2025-02-15 PROCEDURE — 73562 X-RAY EXAM OF KNEE 3: CPT | Mod: 26,50

## 2025-02-15 PROCEDURE — 72131 CT LUMBAR SPINE W/O DYE: CPT | Mod: 26

## 2025-02-15 PROCEDURE — 93970 EXTREMITY STUDY: CPT | Mod: 26

## 2025-02-15 PROCEDURE — 72192 CT PELVIS W/O DYE: CPT | Mod: 26

## 2025-02-15 RX ORDER — ACETAMINOPHEN 500 MG/5ML
975 LIQUID (ML) ORAL ONCE
Refills: 0 | Status: COMPLETED | OUTPATIENT
Start: 2025-02-15 | End: 2025-02-15

## 2025-02-15 RX ORDER — LIDOCAINE HYDROCHLORIDE 20 MG/ML
2 JELLY TOPICAL ONCE
Refills: 0 | Status: COMPLETED | OUTPATIENT
Start: 2025-02-15 | End: 2025-02-15

## 2025-02-15 RX ORDER — METHOCARBAMOL 500 MG/1
750 TABLET, FILM COATED ORAL ONCE
Refills: 0 | Status: COMPLETED | OUTPATIENT
Start: 2025-02-15 | End: 2025-02-15

## 2025-02-15 RX ORDER — OXYCODONE HYDROCHLORIDE AND ACETAMINOPHEN 10; 325 MG/1; MG/1
1 TABLET ORAL ONCE
Refills: 0 | Status: DISCONTINUED | OUTPATIENT
Start: 2025-02-15 | End: 2025-02-15

## 2025-02-15 RX ADMIN — OXYCODONE HYDROCHLORIDE AND ACETAMINOPHEN 1 TABLET(S): 10; 325 TABLET ORAL at 14:00

## 2025-02-15 RX ADMIN — METHOCARBAMOL 750 MILLIGRAM(S): 500 TABLET, FILM COATED ORAL at 13:03

## 2025-02-15 RX ADMIN — Medication 975 MILLIGRAM(S): at 13:03

## 2025-02-15 RX ADMIN — Medication 975 MILLIGRAM(S): at 14:00

## 2025-02-15 RX ADMIN — OXYCODONE HYDROCHLORIDE AND ACETAMINOPHEN 1 TABLET(S): 10; 325 TABLET ORAL at 13:03

## 2025-02-15 RX ADMIN — LIDOCAINE HYDROCHLORIDE 2 PATCH: 20 JELLY TOPICAL at 13:04

## 2025-02-15 NOTE — ED ADULT NURSE NOTE - OBJECTIVE STATEMENT
Pt reports 3 days of mid lower back pain when attempting to get up from sitting and ambulating with walker.  She denies any recently trauma to the area.  No signs of trauma to lower back present. moves all four extremities.  Has right knee pain s/p replacement last year.

## 2025-02-15 NOTE — ED ADULT NURSE REASSESSMENT NOTE - NSFALLHARMRISKINTERV_ED_ALL_ED
Assistance OOB with selected safe patient handling equipment if applicable/Assistance with ambulation/Communicate risk of Fall with Harm to all staff, patient, and family/Monitor gait and stability/Provide patient with walking aids/Provide visual cue: red socks, yellow wristband, yellow gown, etc/Reinforce activity limits and safety measures with patient and family/Use of alarms - bed, stretcher, chair and/or video monitoring/Bed in lowest position, wheels locked, appropriate side rails in place/Call bell, personal items and telephone in reach/Instruct patient to call for assistance before getting out of bed/chair/stretcher/Non-slip footwear applied when patient is off stretcher/[place] to call system/Physically safe environment - no spills, clutter or unnecessary equipment/Purposeful Proactive Rounding/Room/bathroom lighting operational, light cord in reach

## 2025-02-15 NOTE — ED PROVIDER NOTE - OBJECTIVE STATEMENT
The patient is a 69 year old female presents with low back pain for few days.  The patient has history of DM, HTN, ESRD on HD (Monday, Wed, Friday)  No trauma No fever, No chill, No CP, No SOB, No abd pain, No motor No sensory loss  The patient had knee surgery 2 years ago and now ambulating off balance due to knee pain and now giving low back pain  No B/B dysfunction, No saddle anesthesia

## 2025-02-15 NOTE — ED PROVIDER NOTE - MUSCULOSKELETAL, MLM
Spine appears normal, range of motion is not limited, no muscle or joint tenderness +Surgical scar seen in the right knee

## 2025-02-15 NOTE — ED PROVIDER NOTE - PROGRESS NOTE DETAILS
Alan: Pt received in signout from Dr. Camarena. CT lumbar spine showing destructive changes about the L4-5 disc space with erosions and sclerosis, concerning for possible discitis/osteomyelitis. Ortho spine consulted. Waqas: Ortho requesting MRI w/wo IV contrast. Per radiology, MR may be obtained with coordination from nephrology for dialysis. Admitted to medicine. Will send blood cultures but hold antibiotics for now given no signs of sepsis.

## 2025-02-15 NOTE — ED ADULT NURSE REASSESSMENT NOTE - NS ED NURSE REASSESS COMMENT FT1
Back from radiology testing.  Alert and oriented X 4.  Comfortable.  Side rails up with family at bedside.

## 2025-02-15 NOTE — CONSULT NOTE ADULT - NS ATTEND AMEND GEN_ALL_CORE FT
Orthopaedic Spine/Trauma Addendum:    I have personally reviewed the patients chart, imaging and lab results. I have reviewed the physician assistant note and agree with the history, exam, and plan of care, except as noted.    Darrick Wilkins DO  Orthopaedic Spine/Trauma Surgeon  [facility] [place]

## 2025-02-15 NOTE — ED ADULT NURSE NOTE - NSFALLRISKINTERV_ED_ALL_ED

## 2025-02-15 NOTE — ED ADULT NURSE NOTE - CHIEF COMPLAINT QUOTE
Pt is here for body aches, pt states she also has back pain, pt is on dialysis M/W/F, pt denies any fevers or chills, nausea, vomiting.

## 2025-02-15 NOTE — CONSULT NOTE ADULT - SUBJECTIVE AND OBJECTIVE BOX
Pt Name: EULOGIO NIELSEN  MRN: [id number]    Patient is a 70 yo female with chief complaint of low back pain. Patient reports chronic back pain x 2 years that has been worsening over the last few days. Patient reports pain has worsened following her HD session on Wednesday and mostly occurs with bending forward or sitting on the side of her bed. Endorses occasional shooting pain down the anterior thighs bilaterally when bending over. CT lumbar spine showing degenerative disc changes L4-L5. Denies recent fever, chills, SOB, CP, numbness, tingling, motor weakness.    REVIEW OF SYSTEMS  General: Alert, responsive, in NAD  Musculoskeletal: SEE HPI.  Neurological: No sensory or motor changes.   Endocrine: No Hx of diabetes.    PAST MEDICAL & SURGICAL HISTORY:  CAD (coronary artery disease)  CKD (chronic kidney disease) requiring chronic dialysis  Type 2 diabetes mellitus  Diabetic retinopathy associated with diabetes mellitus due to underlying condition  Hypertension  HLD (hyperlipidemia)  CVA (cerebral vascular accident)    Asthma  S/P CABG (coronary artery bypass graft)  S/P coronary artery stent placement  (1)   S/P cataract extraction  OS with retained lens fragment 7/15/15  Acute hemodialysis patient  nothing in the left arm awaiting AV fistula placement - temporary cath in right upper chest quadrant  S/P  section  S/P cholecystectomy    Allergies: lip swelling with lobster (Swelling)  erythromycin (Vomiting)  shellfish (Swelling; Hives)    PHYSICAL EXAM:    Vital Signs Last 24 Hrs  T(C): 36.9 (15 Feb 2025 19:36), Max: 36.9 (15 Feb 2025 19:36)  T(F): 98.4 (15 Feb 2025 19:36), Max: 98.4 (15 Feb 2025 19:36)  HR: 78 (15 Feb 2025 19:36) (70 - 80)  BP: 176/64 (15 Feb 2025 19:36) (169/74 - 189/91)  BP(mean): --  RR: 16 (15 Feb 2025 19:36) (16 - 17)  SpO2: 100% (15 Feb 2025 19:36) (95% - 100%)    Parameters below as of 15 Feb 2025 19:36  Patient On (Oxygen Delivery Method): room air    Daily     Appearance: Alert, responsive, in no acute distress.  Vascular     Lower Extremities: palpable distal pulses. Cap refill < 2 sec.  Musculoskeletal:        Lower Extremities: atraumatic with normal alignment, NROM of b/l knees and ankles, +DF/PF. No crepitus. No bony tenderness.  Neuro:     Lower Extremities neurovascular intact, sensation intact grossly to light touch throughout. No focal deficits or weaknesses found.    Imaging Studies:    < from: CT Lumbar Spine No Cont (02.15.25 @ 18:37) >  ACC: [id number] EXAM:  CT LUMBAR SPINE   ORDERED BY: VIVI SHARMA     PROCEDURE DATE:  02/15/2025          INTERPRETATION:  CT LUMBAR SPINE    CLINICAL INFORMATION: LBP    TECHNIQUE:  Noncontrast CT.  Axial acquisition. Sagittal and coronal reformations.    COMPARISON:  Exam is compared to prior abdominal CT of 2023    FINDINGS:  SPINAL COLUMN:  Destructive process centered on the L4-5 disc space with endplate   erosions, adjacent sclerosis, and new grade 1 anterolisthesis. Erosions   also involve the anterior aspect of the L5 vertebral body.  Additional multifocal lucencies within the T12-L4 vertebra with   associated Schmorl's nodes.  While findings may be represent dialysis related spondyloarthropathy a   discitis/osteomyelitis at the L4-5 level could not be excluded.   Correlation with inflammatory markers are recommended.    DISC LEVELS:  T12/L1: No disc bulge or protrusion. No canal or neural foramina   narrowing.  L1-2: No disc bulge or protrusion. No canal or neural foramina narrowing.  L2-3: No disc bulge or protrusion. Mild facet DJD. No canal or neural   foramina narrowing.  L3-4: Mild disc bulge. Mild facet degenerative changes. Mild bilateral   neural foramina narrowing.  L4-5: Destructive changes about the L4-5disc space with erosions and   sclerosis. Associated erosive changes of the L4-5 facet joints. Grade 1   anterolisthesis. Moderate to severe canal and severe bilateral neural   foramina narrowing.  L5-S1: Mild bulge. Mild facet degenerative changes. No significant spinal   canal narrowing. Moderate bilateral neural foramina narrowing    OTHER:  Atrophic kidneys with multiple cysts.  Heavy atherosclerotic calcification of the visualized vasculature.  No gross paraspinal collection seen.    IMPRESSION:  Destructive process centered at the L4-5 disc space with endplate   erosions, sclerosis, and grade 1 anterolisthesis. Erosion involving the   anterior aspect of the L5 vertebral body. Additional multifocal lucencies   within the T12-L4 vertebra with associated Schmorl's nodes.  While findings may be represent dialysis related spondyloarthropathy a   discitis/osteomyelitis at L4-5 cannot be excluded. Correlation with   inflammatory/infectious markers recommended. MRI can be obtained for   further evaluation if needed.    --- End of Report ---      LI IVEY MD; Attending Radiologist  This document has been electronically signed. Feb 15 2025  7:01PM    < end of copied text >    A/P:  Pt is a 69F w/ low back pain    PLAN:  * Case and plan discussed with Dr. Wilkins  * Dr. Wilkins reviewed prior imaging  * Pain control  * MRI w/ and w/out contrast needed--will need setup prior to HD  * Treatment plan to be finalized after review of pending imaging studies

## 2025-02-15 NOTE — CONSULT NOTE ADULT - SUBJECTIVE AND OBJECTIVE BOX
Pt Name: EULOGIO NIELSEN    MRN: [id number]      Patient is a 67y Female presenting to the emergency department with a chief complaint of low back pain. ED called for abnormal findings on right knee XRAY. Patient reports that she had a right total knee replacement with Dr. Simms in 2023, complicated by periprosthetic fracture seen by ortho in [facility] ED and was transferred to [facility] for revision by Dr Simms in 2023 as well. Patient reports she has chronically subluxed patella that has been that way since her revision surgery. Patient reports no new injury to right knee. Patient has been unable to extend knee or straight leg raise since her revision. Denies any recent trauma. No new complaints with regards to right knee. Patient has been seen by Dr Rodriguez outpatient in 2025 who recommended revision right knee surgery.    PAST MEDICAL & SURGICAL HISTORY:  PAST MEDICAL & SURGICAL HISTORY:  CAD (coronary artery disease)      CKD (chronic kidney disease) requiring chronic dialysis      Type 2 diabetes mellitus      Diabetic retinopathy associated with diabetes mellitus due to underlying condition      Hypertension      HLD (hyperlipidemia)      CVA (cerebral vascular accident)        Asthma      S/P CABG (coronary artery bypass graft)      S/P coronary artery stent placement  (1)       S/P cataract extraction  OS with retained lens fragment 7/15/15      Acute hemodialysis patient  nothing in the left arm awaiting AV fistula placement - temporary cath in right upper chest quadrant      S/P  section      S/P cholecystectomy      Allergies: lip swelling with lobster (Swelling)  erythromycin (Vomiting)  shellfish (Swelling; Hives)      Medications:     FAMILY HISTORY:  No pertinent family history in first degree relatives    : non-contributory    Social History:     Vital Signs Last 24 Hrs  T(C): 36.4 (15 Feb 2025 15:44), Max: 36.5 (15 Feb 2025 11:07)  T(F): 97.5 (15 Feb 2025 15:44), Max: 97.7 (15 Feb 2025 11:07)  HR: 80 (15 Feb 2025 15:44) (70 - 80)  BP: 169/74 (15 Feb 2025 15:44) (169/74 - 189/91)  BP(mean): --  RR: 17 (15 Feb 2025 15:44) (16 - 17)  SpO2: 97% (15 Feb 2025 15:44) (95% - 97%)    Parameters below as of 15 Feb 2025 15:44  Patient On (Oxygen Delivery Method): room air      Daily     Daily     PHYSICAL EXAM:    Appearance: Alert, responsive, in no acute distress.    RLE: right knee incision well healed, skin intact throughout, patella palpable on lateral aspect of knee where it is chronically dislocated, patient able to flex knee while in bed, unable to actively extend knee or straight leg raise due to patellar dislocation, +PF/DF/EHL/FHL, SILT, DP intact, BCR, no cyanosis    Imaging Studies:    XR done    A/P:  Pt is a  69y Female with  found to have chronic patellar dislocation s/p right total knee arthroplasty with revision by Dr Simms in     PLAN:   * WBAT, PT/OT  * Pain Control  * Cont. care per ED  * d/w Dr Solomon  * cont. follow up with Dr Rodriguez outpatient

## 2025-02-15 NOTE — ED ADULT NURSE REASSESSMENT NOTE - NSFALLRISKFACTORS_ED_ALL_ED
on plavix/Coagulation: Bleeding disorder either through use of anticoagulants or underlying clinical condition(s)

## 2025-02-15 NOTE — ED PROVIDER NOTE - CARE PLAN
Principal Discharge DX:	Back pain   1 Principal Discharge DX:	Back pain  Secondary Diagnosis:	ESRD on dialysis

## 2025-02-15 NOTE — ED ADULT NURSE NOTE - CAS TRG GEN SKIN COLOR
How Severe Is Your Psoriasis?: mild Do You Have A Family History Of Psoriasis?: no Is This A New Presentation, Or A Follow-Up?: Follow Up Psoriasis Additional History: Pt currently taking enbrel prescribed through his rheumatologist. Normal for race

## 2025-02-15 NOTE — ED PROVIDER NOTE - CLINICAL SUMMARY MEDICAL DECISION MAKING FREE TEXT BOX
69y F w/ hx ESRD on HD M/W/F, CAD s/p CABG, HTN, DM, R-knee replacement; presents for worsening low back pain. Also with chronic right knee pain. Pt neuro intact. CT showing possible lumbar discitis/osteomyelitis. Ortho/spine consulted, will obtain MRI.

## 2025-02-16 DIAGNOSIS — M54.9 DORSALGIA, UNSPECIFIED: ICD-10-CM

## 2025-02-16 LAB
A1C WITH ESTIMATED AVERAGE GLUCOSE RESULT: 6.9 % — HIGH (ref 4–5.6)
ANION GAP SERPL CALC-SCNC: 17 MMOL/L — SIGNIFICANT CHANGE UP (ref 5–17)
BUN SERPL-MCNC: 40.6 MG/DL — HIGH (ref 8–20)
CALCIUM SERPL-MCNC: 8.7 MG/DL — SIGNIFICANT CHANGE UP (ref 8.4–10.5)
CHLORIDE SERPL-SCNC: 95 MMOL/L — LOW (ref 96–108)
CO2 SERPL-SCNC: 27 MMOL/L — SIGNIFICANT CHANGE UP (ref 22–29)
CREAT SERPL-MCNC: 7.34 MG/DL — HIGH (ref 0.5–1.3)
EGFR: 6 ML/MIN/1.73M2 — LOW
EGFR: 6 ML/MIN/1.73M2 — LOW
ESTIMATED AVERAGE GLUCOSE: 151 MG/DL — HIGH (ref 68–114)
GLUCOSE BLDC GLUCOMTR-MCNC: 104 MG/DL — HIGH (ref 70–99)
GLUCOSE BLDC GLUCOMTR-MCNC: 184 MG/DL — HIGH (ref 70–99)
GLUCOSE BLDC GLUCOMTR-MCNC: 90 MG/DL — SIGNIFICANT CHANGE UP (ref 70–99)
GLUCOSE SERPL-MCNC: 110 MG/DL — HIGH (ref 70–99)
HCT VFR BLD CALC: 34.2 % — LOW (ref 34.5–45)
HGB BLD-MCNC: 10.2 G/DL — LOW (ref 11.5–15.5)
MCHC RBC-ENTMCNC: 27.3 PG — SIGNIFICANT CHANGE UP (ref 27–34)
MCHC RBC-ENTMCNC: 29.8 G/DL — LOW (ref 32–36)
MCV RBC AUTO: 91.7 FL — SIGNIFICANT CHANGE UP (ref 80–100)
NRBC # BLD AUTO: 0 K/UL — SIGNIFICANT CHANGE UP (ref 0–0)
NRBC # FLD: 0 K/UL — SIGNIFICANT CHANGE UP (ref 0–0)
NRBC BLD AUTO-RTO: 0 /100 WBCS — SIGNIFICANT CHANGE UP (ref 0–0)
PLATELET # BLD AUTO: 317 K/UL — SIGNIFICANT CHANGE UP (ref 150–400)
PMV BLD: 10.9 FL — SIGNIFICANT CHANGE UP (ref 7–13)
POTASSIUM SERPL-MCNC: 4.4 MMOL/L — SIGNIFICANT CHANGE UP (ref 3.5–5.3)
POTASSIUM SERPL-SCNC: 4.4 MMOL/L — SIGNIFICANT CHANGE UP (ref 3.5–5.3)
RBC # BLD: 3.73 M/UL — LOW (ref 3.8–5.2)
RBC # FLD: 14.8 % — HIGH (ref 10.3–14.5)
SODIUM SERPL-SCNC: 138 MMOL/L — SIGNIFICANT CHANGE UP (ref 135–145)
WBC # BLD: 4.68 K/UL — SIGNIFICANT CHANGE UP (ref 3.8–10.5)
WBC # FLD AUTO: 4.68 K/UL — SIGNIFICANT CHANGE UP (ref 3.8–10.5)

## 2025-02-16 PROCEDURE — 99223 1ST HOSP IP/OBS HIGH 75: CPT

## 2025-02-16 PROCEDURE — 99497 ADVNCD CARE PLAN 30 MIN: CPT | Mod: 25

## 2025-02-16 PROCEDURE — 76942 ECHO GUIDE FOR BIOPSY: CPT | Mod: 26

## 2025-02-16 RX ORDER — HEPARIN SODIUM 1000 [USP'U]/ML
5000 INJECTION INTRAVENOUS; SUBCUTANEOUS EVERY 8 HOURS
Refills: 0 | Status: DISCONTINUED | OUTPATIENT
Start: 2025-02-16 | End: 2025-02-24

## 2025-02-16 RX ORDER — FOLIC ACID 1 MG/1
1 TABLET ORAL DAILY
Refills: 0 | Status: DISCONTINUED | OUTPATIENT
Start: 2025-02-16 | End: 2025-03-04

## 2025-02-16 RX ORDER — HYDROMORPHONE/SOD CHLOR,ISO/PF 2 MG/10 ML
0.5 SYRINGE (ML) INJECTION EVERY 6 HOURS
Refills: 0 | Status: DISCONTINUED | OUTPATIENT
Start: 2025-02-16 | End: 2025-02-18

## 2025-02-16 RX ORDER — DEXTROSE 50 % IN WATER 50 %
25 SYRINGE (ML) INTRAVENOUS ONCE
Refills: 0 | Status: DISCONTINUED | OUTPATIENT
Start: 2025-02-16 | End: 2025-03-04

## 2025-02-16 RX ORDER — INSULIN LISPRO 100 U/ML
INJECTION, SOLUTION INTRAVENOUS; SUBCUTANEOUS AT BEDTIME
Refills: 0 | Status: DISCONTINUED | OUTPATIENT
Start: 2025-02-16 | End: 2025-03-04

## 2025-02-16 RX ORDER — SENNA 187 MG
2 TABLET ORAL AT BEDTIME
Refills: 0 | Status: DISCONTINUED | OUTPATIENT
Start: 2025-02-16 | End: 2025-03-04

## 2025-02-16 RX ORDER — GLUCAGON 3 MG/1
1 POWDER NASAL ONCE
Refills: 0 | Status: DISCONTINUED | OUTPATIENT
Start: 2025-02-16 | End: 2025-03-04

## 2025-02-16 RX ORDER — MAGNESIUM, ALUMINUM HYDROXIDE 200-200 MG
30 TABLET,CHEWABLE ORAL EVERY 4 HOURS
Refills: 0 | Status: DISCONTINUED | OUTPATIENT
Start: 2025-02-16 | End: 2025-03-04

## 2025-02-16 RX ORDER — ALBUTEROL SULFATE 2.5 MG/3ML
2 VIAL, NEBULIZER (ML) INHALATION EVERY 6 HOURS
Refills: 0 | Status: DISCONTINUED | OUTPATIENT
Start: 2025-02-16 | End: 2025-03-04

## 2025-02-16 RX ORDER — SODIUM CHLORIDE 9 G/1000ML
1000 INJECTION, SOLUTION INTRAVENOUS
Refills: 0 | Status: DISCONTINUED | OUTPATIENT
Start: 2025-02-16 | End: 2025-03-04

## 2025-02-16 RX ORDER — ATORVASTATIN CALCIUM 80 MG/1
20 TABLET, FILM COATED ORAL AT BEDTIME
Refills: 0 | Status: DISCONTINUED | OUTPATIENT
Start: 2025-02-16 | End: 2025-03-01

## 2025-02-16 RX ORDER — HYDROMORPHONE/SOD CHLOR,ISO/PF 2 MG/10 ML
0.2 SYRINGE (ML) INJECTION EVERY 6 HOURS
Refills: 0 | Status: DISCONTINUED | OUTPATIENT
Start: 2025-02-16 | End: 2025-02-18

## 2025-02-16 RX ORDER — ACETAMINOPHEN 500 MG/5ML
650 LIQUID (ML) ORAL EVERY 6 HOURS
Refills: 0 | Status: DISCONTINUED | OUTPATIENT
Start: 2025-02-16 | End: 2025-03-01

## 2025-02-16 RX ORDER — NALOXONE HYDROCHLORIDE 0.4 MG/ML
1 INJECTION, SOLUTION INTRAMUSCULAR; INTRAVENOUS; SUBCUTANEOUS ONCE
Refills: 0 | Status: DISCONTINUED | OUTPATIENT
Start: 2025-02-16 | End: 2025-03-04

## 2025-02-16 RX ORDER — CLOPIDOGREL BISULFATE 75 MG/1
75 TABLET, FILM COATED ORAL DAILY
Refills: 0 | Status: DISCONTINUED | OUTPATIENT
Start: 2025-02-16 | End: 2025-02-20

## 2025-02-16 RX ORDER — MONTELUKAST SODIUM 10 MG/1
10 TABLET ORAL DAILY
Refills: 0 | Status: DISCONTINUED | OUTPATIENT
Start: 2025-02-16 | End: 2025-03-04

## 2025-02-16 RX ORDER — ONDANSETRON HCL/PF 4 MG/2 ML
4 VIAL (ML) INJECTION EVERY 8 HOURS
Refills: 0 | Status: DISCONTINUED | OUTPATIENT
Start: 2025-02-16 | End: 2025-03-04

## 2025-02-16 RX ORDER — DEXTROSE 50 % IN WATER 50 %
12.5 SYRINGE (ML) INTRAVENOUS ONCE
Refills: 0 | Status: DISCONTINUED | OUTPATIENT
Start: 2025-02-16 | End: 2025-03-04

## 2025-02-16 RX ORDER — MELATONIN 5 MG
3 TABLET ORAL AT BEDTIME
Refills: 0 | Status: DISCONTINUED | OUTPATIENT
Start: 2025-02-16 | End: 2025-03-04

## 2025-02-16 RX ORDER — INSULIN LISPRO 100 U/ML
INJECTION, SOLUTION INTRAVENOUS; SUBCUTANEOUS
Refills: 0 | Status: DISCONTINUED | OUTPATIENT
Start: 2025-02-16 | End: 2025-03-04

## 2025-02-16 RX ORDER — ASPIRIN 325 MG
81 TABLET ORAL DAILY
Refills: 0 | Status: DISCONTINUED | OUTPATIENT
Start: 2025-02-16 | End: 2025-02-20

## 2025-02-16 RX ORDER — LABETALOL HYDROCHLORIDE 200 MG/1
200 TABLET, FILM COATED ORAL
Refills: 0 | Status: DISCONTINUED | OUTPATIENT
Start: 2025-02-16 | End: 2025-03-04

## 2025-02-16 RX ORDER — DARBEPOETIN ALFA 40 UG/ML
40 SOLUTION INTRAVENOUS; SUBCUTANEOUS
Refills: 0 | Status: DISCONTINUED | OUTPATIENT
Start: 2025-02-17 | End: 2025-02-19

## 2025-02-16 RX ORDER — DEXTROSE 50 % IN WATER 50 %
15 SYRINGE (ML) INTRAVENOUS ONCE
Refills: 0 | Status: DISCONTINUED | OUTPATIENT
Start: 2025-02-16 | End: 2025-03-04

## 2025-02-16 RX ADMIN — Medication 81 MILLIGRAM(S): at 09:08

## 2025-02-16 RX ADMIN — Medication 50 MILLIGRAM(S): at 06:09

## 2025-02-16 RX ADMIN — HEPARIN SODIUM 5000 UNIT(S): 1000 INJECTION INTRAVENOUS; SUBCUTANEOUS at 13:12

## 2025-02-16 RX ADMIN — MONTELUKAST SODIUM 10 MILLIGRAM(S): 10 TABLET ORAL at 09:07

## 2025-02-16 RX ADMIN — Medication 0.5 MILLIGRAM(S): at 12:14

## 2025-02-16 RX ADMIN — HEPARIN SODIUM 5000 UNIT(S): 1000 INJECTION INTRAVENOUS; SUBCUTANEOUS at 22:17

## 2025-02-16 RX ADMIN — Medication 650 MILLIGRAM(S): at 10:00

## 2025-02-16 RX ADMIN — LABETALOL HYDROCHLORIDE 200 MILLIGRAM(S): 200 TABLET, FILM COATED ORAL at 06:09

## 2025-02-16 RX ADMIN — CLOPIDOGREL BISULFATE 75 MILLIGRAM(S): 75 TABLET, FILM COATED ORAL at 09:07

## 2025-02-16 RX ADMIN — ATORVASTATIN CALCIUM 20 MILLIGRAM(S): 80 TABLET, FILM COATED ORAL at 22:17

## 2025-02-16 RX ADMIN — FOLIC ACID 1 MILLIGRAM(S): 1 TABLET ORAL at 09:07

## 2025-02-16 RX ADMIN — Medication 0.5 MILLIGRAM(S): at 11:14

## 2025-02-16 RX ADMIN — Medication 50 MILLIGRAM(S): at 17:18

## 2025-02-16 RX ADMIN — Medication 2 TABLET(S): at 22:17

## 2025-02-16 RX ADMIN — Medication 650 MILLIGRAM(S): at 09:07

## 2025-02-16 RX ADMIN — HEPARIN SODIUM 5000 UNIT(S): 1000 INJECTION INTRAVENOUS; SUBCUTANEOUS at 06:08

## 2025-02-16 RX ADMIN — Medication 0.5 MILLIGRAM(S): at 22:44

## 2025-02-16 RX ADMIN — Medication 0.5 MILLIGRAM(S): at 22:22

## 2025-02-16 RX ADMIN — INSULIN LISPRO 0: 100 INJECTION, SOLUTION INTRAVENOUS; SUBCUTANEOUS at 22:17

## 2025-02-16 RX ADMIN — LABETALOL HYDROCHLORIDE 200 MILLIGRAM(S): 200 TABLET, FILM COATED ORAL at 17:18

## 2025-02-16 NOTE — CHART NOTE - NSCHARTNOTEFT_GEN_A_CORE
70 y/o F here w/ acute on chronic lower back pain. f/u MRI, possible discitis/OM, hold off on antibiotic until MRI. c/w MWF HD. remaining plan as per scarlett.

## 2025-02-16 NOTE — H&P ADULT - HISTORY OF PRESENT ILLNESS
70 y/o female with PMH of HTN, HLD, ESRD on HD (M/W/F), DM-2 came to the ED complaining of low back pain. Pain has been going on for few days; described as aching, radiating to both thighs anteriorly. Patient reported chronic back pain but in the past few days it has worsened; bending forward/sitting on the side exacerbate the pain. She has no trauma to the back, fall, fever, chills, nausea, vomiting, abdominal pain, change in bowel habit, chest pain, shortness of breath.

## 2025-02-16 NOTE — H&P ADULT - ASSESSMENT
68 y/o female with PMH of HTN, HLD, ESRD on HD (M/W/F), DM-2 came to the ED complaining of low back pain. Pain has been going on for few days; described as aching, radiating to both thighs anteriorly. Patient reported chronic back pain but in the past few days it has worsened; bending forward/sitting on the side exacerbate the pain.       Acute on chronic low back pain   Admit to medical floor   CT lumbar degenerative disc changes at L4-L5 concerning for discitis/OM   Ortho spine consulted, rec MRI w/wo.   MRI to be coordinated with radiology and nephrology given hx of ESRD.   Pain control     Right knee pain   XR knee: lateral prosthetic patellar dislocation  Seen by ortho  Pain control   PT eval     ESRD   On HD (M/W/F)   Nephrology consulted   Monitor BMP     HTN/HLD/CAD   Plavix 75mg   Simvastatin 40mg   Labetalol 200mg bid   Hydralazine 50mg   Aspirin     Hyperglycemia with DM-2   Patient not on medication   HbA1C: 5.5 (07/24/23)   Insulin sliding scale   Will check HbA1C with AM lab   Lifestyle modification     Supportive   DVT prophylaxis: Heparin sc  Diet: renal     Plan of care discussed with patient and ER nurse     Dispo: when stable, home.    68 y/o female with PMH of HTN, HLD, ESRD on HD (M/W/F), DM-2 came to the ED complaining of low back pain. Pain has been going on for few days; described as aching, radiating to both thighs anteriorly. Patient reported chronic back pain but in the past few days it has worsened; bending forward/sitting on the side exacerbate the pain.       Acute on chronic low back pain   Admit to medical floor   CT lumbar degenerative disc changes at L4-L5 concerning for discitis/OM   Ortho spine consulted, rec MRI w/wo.   MRI to be coordinated with radiology and nephrology given hx of ESRD.   Will hold antibiotic for now pending MR. Patient non-toxic, no leukocytosis, afebrile.   Pain control     Right knee pain   XR knee: lateral prosthetic patellar dislocation  Seen by ortho  Pain control   PT eval     ESRD   On HD (M/W/F)   Nephrology consulted   Monitor BMP     HTN/HLD/CAD   Plavix 75mg   Simvastatin 40mg   Labetalol 200mg bid   Hydralazine 50mg   Aspirin     Hyperglycemia with DM-2   Patient not on medication   HbA1C: 5.5 (07/24/23)   Insulin sliding scale   Will check HbA1C with AM lab   Lifestyle modification     Supportive   DVT prophylaxis: Heparin sc  Diet: renal     Plan of care discussed with patient and ER nurse     Dispo: when stable, home.

## 2025-02-16 NOTE — H&P ADULT - MUSCULOSKELETAL
ROM intact/no joint swelling/no joint erythema/no joint warmth/no calf tenderness/strength 5/5 bilateral upper extremities

## 2025-02-16 NOTE — PATIENT PROFILE ADULT - CAREGIVER ADDRESS
Nick Xenograft Text: The defect edges were debeveled with a #15 scalpel blade.  Given the location of the defect, shape of the defect and the proximity to free margins a xenograft was deemed most appropriate.  The graft was then trimmed to fit the size of the defect.  The graft was then placed in the primary defect and oriented appropriately.

## 2025-02-16 NOTE — PATIENT PROFILE ADULT - BILL PAYMENT
[facility] ED     Emergency Department     Faculty Attestation        I performed a history and physical examination of the patient and discussed management with the resident. I reviewed the residents note and agree with the documented findings and plan of care. Any areas of disagreement are noted on the chart. I was personally present for the key portions of any procedures. I have documented in the chart those procedures where I was not present during the key portions. I have reviewed the emergency nurses triage note. I agree with the chief complaint, past medical history, past surgical history, allergies, medications, social and family history as documented unless otherwise noted below. For Physician Assistant/ Nurse Practitioner cases/documentation I have personally evaluated this patient and have completed at least one if not all key elements of the E/M (history, physical exam, and MDM). Additional findings are as noted. Vital Signs: BP: (!) 185/114  Heart Rate: (!) 118  Resp: 21  Temp: 98.4 °F (36.9 °C) SpO2: 98 %  PCP:  Yossi Jean MD    Pertinent Comments:     Patient is a 29-year-old female with significant past medical history of a few years ago having Hashimoto's thyroiditis that then resulted in hypothyroidism. Has had several episodes of hypothyroid myopathy as well as rhabdomyolysis in the past.   Several admissions for IV Synthroid as well. She feels that she is having another 1 of these episodes and is here for evaluation at recommendation of her endocrinologist, Dr. Ephraim Mortimer. Critical Care  None    This patient was evaluated in the Emergency Department for symptoms described in the history of present illness. He/she was evaluated in the context of the global COVID-19 pandemic, which necessitated consideration that the patient might be at risk for infection with the SARS-CoV-2 virus that causes COVID-19.  Institutional protocols and algorithms that pertain to the evaluation of patients at risk for COVID-19 are in a state of rapid change based on information released by regulatory bodies including the CDC and federal and state organizations. These policies and algorithms were followed during the patient's care in the ED. (Please note that portions of this note were completed with a voice recognition program. Efforts were made to edit the dictations but occasionally words are mis-transcribed.  Whenever words are used in this note in any gender, they shall be construed as though they were used in the gender appropriate to the circumstances; and whenever words are used in this note in the singular or plural form, they shall be construed as though they were used in the form appropriate to the circumstances.)    Lilyan Felty, MD Guillermina Ester  Attending Emergency Medicine Physician           Grace Hernandez MD  07/11/22 1500  [address] Zeke MD  07/11/22 [id number] no

## 2025-02-16 NOTE — H&P ADULT - NSHPPHYSICALEXAM_GEN_ALL_CORE
Vital Signs Last 24 Hrs  T(C): 36.6 (15 Feb 2025 23:11), Max: 36.9 (15 Feb 2025 19:36)  T(F): 97.8 (15 Feb 2025 23:11), Max: 98.4 (15 Feb 2025 19:36)  HR: 74 (15 Feb 2025 23:11) (70 - 80)  BP: 120/60 (15 Feb 2025 23:11) (120/60 - 189/91)  BP(mean): 80 (15 Feb 2025 23:11) (80 - 80)  RR: 18 (15 Feb 2025 23:11) (16 - 18)  SpO2: 100% (15 Feb 2025 23:11) (95% - 100%)    Parameters below as of 15 Feb 2025 23:11  Patient On (Oxygen Delivery Method): room air

## 2025-02-16 NOTE — H&P ADULT - CONVERSATION DETAILS
Advance care directive discussed with patient. She named her  as the health care proxy. Patient said she will like everything done to keep her going including heroic measures. Patient is FULL CODE.

## 2025-02-16 NOTE — CONSULT NOTE ADULT - SUBJECTIVE AND OBJECTIVE BOX
[facility] DIVISION OF KIDNEY DISEASES AND HYPERTENSION -- INITIAL CONSULT NOTE  --------------------------------------------------------------------------------  HPI:  70 yo female with history of HTN, HLD, ESRD on HD (M/W/F), DM-2 came to ED complaints of lower back pain and lower extremity weakness. Her CT of L-spine showed destructive process centered at the L4-5 disc space with endplate   erosions, sclerosis, and grade 1 anterolisthesis, she was seen by Orthopedics, who recommended to do MRI of the spine with iv contrast to rule out discitis/osteomyelitis of the L-spine.   Nephrology was consulted for HD needs.     PAST HISTORY  --------------------------------------------------------------------------------  PAST MEDICAL & SURGICAL HISTORY:  CAD (coronary artery disease)      CKD (chronic kidney disease) requiring chronic dialysis      Type 2 diabetes mellitus      Diabetic retinopathy associated with diabetes mellitus due to underlying condition      Hypertension      HLD (hyperlipidemia)      CVA (cerebral vascular accident)        Asthma      S/P CABG (coronary artery bypass graft)      S/P coronary artery stent placement  (1)       S/P cataract extraction  OS with retained lens fragment 7/15/15      Acute hemodialysis patient  nothing in the left arm awaiting AV fistula placement - temporary cath in right upper chest quadrant      S/P  section      S/P cholecystectomy        FAMILY HISTORY:  FHx: breast cancer (Sibling)      PAST SOCIAL HISTORY:    ALLERGIES & MEDICATIONS  --------------------------------------------------------------------------------  Allergies    lip swelling with lobster (Swelling)  erythromycin (Vomiting)  shellfish (Swelling; Hives)    Intolerances      Standing Inpatient Medications  aspirin enteric coated 81 milliGRAM(s) Oral daily  atorvastatin 20 milliGRAM(s) Oral at bedtime  clopidogrel Tablet 75 milliGRAM(s) Oral daily  dextrose 5%. 1000 milliLiter(s) IV Continuous <Continuous>  dextrose 5%. 1000 milliLiter(s) IV Continuous <Continuous>  dextrose 50% Injectable 25 Gram(s) IV Push once  dextrose 50% Injectable 12.5 Gram(s) IV Push once  dextrose 50% Injectable 25 Gram(s) IV Push once  folic acid 1 milliGRAM(s) Oral daily  glucagon  Injectable 1 milliGRAM(s) IntraMuscular once  heparin   Injectable 5000 Unit(s) SubCutaneous every 8 hours  hydrALAZINE 50 milliGRAM(s) Oral two times a day  insulin lispro (ADMELOG) corrective regimen sliding scale   SubCutaneous three times a day before meals  insulin lispro (ADMELOG) corrective regimen sliding scale   SubCutaneous at bedtime  labetalol 200 milliGRAM(s) Oral two times a day  montelukast 10 milliGRAM(s) Oral daily  senna 2 Tablet(s) Oral at bedtime    PRN Inpatient Medications  acetaminophen     Tablet .. 650 milliGRAM(s) Oral every 6 hours PRN  albuterol    90 MICROgram(s) HFA Inhaler 2 Puff(s) Inhalation every 6 hours PRN  aluminum hydroxide/magnesium hydroxide/simethicone Suspension 30 milliLiter(s) Oral every 4 hours PRN  dextrose Oral Gel 15 Gram(s) Oral once PRN  HYDROmorphone  Injectable 0.2 milliGRAM(s) IV Push every 6 hours PRN  HYDROmorphone  Injectable 0.5 milliGRAM(s) IV Push every 6 hours PRN  melatonin 3 milliGRAM(s) Oral at bedtime PRN  naloxone Injectable 1 milliGRAM(s) IV Push once PRN  ondansetron Injectable 4 milliGRAM(s) IV Push every 8 hours PRN      REVIEW OF SYSTEMS  --------------------------------------------------------------------------------  Gen: No weight changes, fatigue, fevers/chills, weakness  Skin: No rashes  Head/Eyes/Ears/Mouth: No headache; Normal hearing; Normal vision w/o blurriness; No sinus pain/discomfort, sore throat  Respiratory: No dyspnea, cough, wheezing, hemoptysis  CV: No chest pain, PND, orthopnea  GI: No abdominal pain, diarrhea, constipation, nausea, vomiting, melena, hematochezia  : No increased frequency, dysuria, hematuria, nocturia  MSK: No joint pain/swelling; no back pain; no edema  Neuro: No dizziness/lightheadedness, weakness, seizures, numbness, tingling  Heme: No easy bruising or bleeding      All other systems were reviewed and are negative, except as noted.    VITALS/PHYSICAL EXAM  --------------------------------------------------------------------------------  T(C): 36.7 (25 @ 15:29), Max: 36.9 (02-15-25 @ 19:36)  HR: 68 (25 @ 15:29) (67 - 78)  BP: 148/63 (25 @ 15:29) (120/60 - 176/64)  RR: 18 (25 @ 15:29) (16 - 18)  SpO2: 100% (25 @ 15:29) (95% - 100%)  Wt(kg): --        Physical Exam:  Gen: NAD, well-appearing  HEENT: PERRL, supple neck, clear oropharynx  Pulm: CTA B/L  CV: RRR, S1S2; no peripheral edema  Abd: +BS, soft, nontender/nondistended  Neuro: A and Ox3  MSK no deformities    LABS/STUDIES  --------------------------------------------------------------------------------              10.2   4.68  >-----------<  317      [25 @ 06:15]              34.2     138  |  95  |  40.6  ----------------------------<  110      [25 10:55]  4.4   |  27.0  |  7.34        Ca     8.7     [25 10:55]            Creatinine Trend:  SCr 7.34 [ 10:55]  SCr 6.37 [02-15 @ 21:30]    Urinalysis - [25 10:55]      Color  / Appearance  / SG  / pH       Gluc 110 / Ketone   / Bili  / Urobili        Blood  / Protein  / Leuk Est  / Nitrite       RBC  / WBC  / Hyaline  / Gran  / Sq Epi  / Non Sq Epi  / Bacteria         HBsAb 33.6      [23 @ 21:50]  HBsAg Nonreact      [23 @ 06:47]  HBcAb Nonreact      [23 @ 21:50]  HCV 0.21, Nonreact      [23 @ 06:47]

## 2025-02-16 NOTE — PROGRESS NOTE ADULT - SUBJECTIVE AND OBJECTIVE BOX
Pt seen in CDU, pain controlled. Exam remains stable, no acute deficits in BLLE, has chronic RLE Knee ext and flexor weakness 2/2 prior knee surgeries.    CT scan demonstrates similar bone lesions from CT abd/pel from 2023 that was not commented on.  However, there is new extensive erosion of anterior L5 vertebral body, new Grade 1 anterolisthesis and severe central and b/l foraminal (L4 nerve compression.  This is likely the cause of anterior thigh pain with standing and walking.    New erosion of L5 body could be OM/Discitis, though patient does not look septic and inflammatory mildly elevated so difficult to say.    Could be mets/tumor which was not commented on but must be on differential  Brown tumor of spine vs amyloid deposits 2/2 chronic hemodialysis   MRI w/wo of lumbar spine  d/w patient possible need for surgical intervention for stabilization and decompression for pain control, she did not seem interested in surgery at this time  Bed rest for now  Pain control  further recs pending MRI

## 2025-02-16 NOTE — ED ADULT NURSE REASSESSMENT NOTE - COMFORT CARE
plan of care explained/wait time explained
pt in verbal understanding r/t admission to [facility]/darkened lights/plan of care explained/wait time explained

## 2025-02-17 LAB
ANION GAP SERPL CALC-SCNC: 17 MMOL/L — SIGNIFICANT CHANGE UP (ref 5–17)
BASOPHILS # BLD AUTO: 0.03 K/UL — SIGNIFICANT CHANGE UP (ref 0–0.2)
BASOPHILS NFR BLD AUTO: 0.7 % — SIGNIFICANT CHANGE UP (ref 0–2)
BUN SERPL-MCNC: 53.4 MG/DL — HIGH (ref 8–20)
CALCIUM SERPL-MCNC: 8.4 MG/DL — SIGNIFICANT CHANGE UP (ref 8.4–10.5)
CHLORIDE SERPL-SCNC: 97 MMOL/L — SIGNIFICANT CHANGE UP (ref 96–108)
CO2 SERPL-SCNC: 24 MMOL/L — SIGNIFICANT CHANGE UP (ref 22–29)
CREAT SERPL-MCNC: 8.75 MG/DL — HIGH (ref 0.5–1.3)
EGFR: 5 ML/MIN/1.73M2 — LOW
EGFR: 5 ML/MIN/1.73M2 — LOW
EOSINOPHIL # BLD AUTO: 0.17 K/UL — SIGNIFICANT CHANGE UP (ref 0–0.5)
EOSINOPHIL NFR BLD AUTO: 3.8 % — SIGNIFICANT CHANGE UP (ref 0–6)
GLUCOSE BLDC GLUCOMTR-MCNC: 117 MG/DL — HIGH (ref 70–99)
GLUCOSE BLDC GLUCOMTR-MCNC: 127 MG/DL — HIGH (ref 70–99)
GLUCOSE BLDC GLUCOMTR-MCNC: 189 MG/DL — HIGH (ref 70–99)
GLUCOSE BLDC GLUCOMTR-MCNC: 98 MG/DL — SIGNIFICANT CHANGE UP (ref 70–99)
GLUCOSE SERPL-MCNC: 91 MG/DL — SIGNIFICANT CHANGE UP (ref 70–99)
HCT VFR BLD CALC: 30.7 % — LOW (ref 34.5–45)
HGB BLD-MCNC: 9.2 G/DL — LOW (ref 11.5–15.5)
IMM GRANULOCYTES # BLD AUTO: 0.03 K/UL — SIGNIFICANT CHANGE UP (ref 0–0.07)
IMM GRANULOCYTES NFR BLD AUTO: 0.7 % — SIGNIFICANT CHANGE UP (ref 0–0.9)
LYMPHOCYTES # BLD AUTO: 1.39 K/UL — SIGNIFICANT CHANGE UP (ref 1–3.3)
LYMPHOCYTES NFR BLD AUTO: 31.1 % — SIGNIFICANT CHANGE UP (ref 13–44)
MAGNESIUM SERPL-MCNC: 2.3 MG/DL — SIGNIFICANT CHANGE UP (ref 1.6–2.6)
MCHC RBC-ENTMCNC: 27.5 PG — SIGNIFICANT CHANGE UP (ref 27–34)
MCHC RBC-ENTMCNC: 30 G/DL — LOW (ref 32–36)
MCV RBC AUTO: 91.9 FL — SIGNIFICANT CHANGE UP (ref 80–100)
MONOCYTES # BLD AUTO: 0.44 K/UL — SIGNIFICANT CHANGE UP (ref 0–0.9)
MONOCYTES NFR BLD AUTO: 9.8 % — SIGNIFICANT CHANGE UP (ref 2–14)
NEUTROPHILS # BLD AUTO: 2.41 K/UL — SIGNIFICANT CHANGE UP (ref 1.8–7.4)
NEUTROPHILS NFR BLD AUTO: 53.9 % — SIGNIFICANT CHANGE UP (ref 43–77)
NRBC # BLD AUTO: 0 K/UL — SIGNIFICANT CHANGE UP (ref 0–0)
NRBC # FLD: 0 K/UL — SIGNIFICANT CHANGE UP (ref 0–0)
NRBC BLD AUTO-RTO: 0 /100 WBCS — SIGNIFICANT CHANGE UP (ref 0–0)
PLATELET # BLD AUTO: 304 K/UL — SIGNIFICANT CHANGE UP (ref 150–400)
PMV BLD: 10.7 FL — SIGNIFICANT CHANGE UP (ref 7–13)
POTASSIUM SERPL-MCNC: 4.6 MMOL/L — SIGNIFICANT CHANGE UP (ref 3.5–5.3)
POTASSIUM SERPL-SCNC: 4.6 MMOL/L — SIGNIFICANT CHANGE UP (ref 3.5–5.3)
RBC # BLD: 3.34 M/UL — LOW (ref 3.8–5.2)
RBC # FLD: 14.8 % — HIGH (ref 10.3–14.5)
SODIUM SERPL-SCNC: 138 MMOL/L — SIGNIFICANT CHANGE UP (ref 135–145)
WBC # BLD: 4.47 K/UL — SIGNIFICANT CHANGE UP (ref 3.8–10.5)
WBC # FLD AUTO: 4.47 K/UL — SIGNIFICANT CHANGE UP (ref 3.8–10.5)

## 2025-02-17 PROCEDURE — 72158 MRI LUMBAR SPINE W/O & W/DYE: CPT | Mod: 26

## 2025-02-17 PROCEDURE — 99232 SBSQ HOSP IP/OBS MODERATE 35: CPT

## 2025-02-17 PROCEDURE — 99233 SBSQ HOSP IP/OBS HIGH 50: CPT

## 2025-02-17 PROCEDURE — 99231 SBSQ HOSP IP/OBS SF/LOW 25: CPT

## 2025-02-17 RX ADMIN — Medication 0.5 MILLIGRAM(S): at 07:40

## 2025-02-17 RX ADMIN — Medication 0.5 MILLIGRAM(S): at 21:00

## 2025-02-17 RX ADMIN — Medication 0.5 MILLIGRAM(S): at 20:15

## 2025-02-17 RX ADMIN — Medication 50 MILLIGRAM(S): at 05:35

## 2025-02-17 RX ADMIN — DARBEPOETIN ALFA 40 MICROGRAM(S): 40 SOLUTION INTRAVENOUS; SUBCUTANEOUS at 11:36

## 2025-02-17 RX ADMIN — HEPARIN SODIUM 5000 UNIT(S): 1000 INJECTION INTRAVENOUS; SUBCUTANEOUS at 14:19

## 2025-02-17 RX ADMIN — Medication 50 MILLIGRAM(S): at 14:20

## 2025-02-17 RX ADMIN — CLOPIDOGREL BISULFATE 75 MILLIGRAM(S): 75 TABLET, FILM COATED ORAL at 11:35

## 2025-02-17 RX ADMIN — LABETALOL HYDROCHLORIDE 200 MILLIGRAM(S): 200 TABLET, FILM COATED ORAL at 05:35

## 2025-02-17 RX ADMIN — Medication 50 MILLIGRAM(S): at 21:48

## 2025-02-17 RX ADMIN — Medication 0.5 MILLIGRAM(S): at 08:10

## 2025-02-17 RX ADMIN — INSULIN LISPRO 1: 100 INJECTION, SOLUTION INTRAVENOUS; SUBCUTANEOUS at 08:28

## 2025-02-17 RX ADMIN — HEPARIN SODIUM 5000 UNIT(S): 1000 INJECTION INTRAVENOUS; SUBCUTANEOUS at 05:35

## 2025-02-17 RX ADMIN — LABETALOL HYDROCHLORIDE 200 MILLIGRAM(S): 200 TABLET, FILM COATED ORAL at 16:26

## 2025-02-17 RX ADMIN — Medication 81 MILLIGRAM(S): at 11:35

## 2025-02-17 NOTE — PROGRESS NOTE ADULT - ASSESSMENT
68 y/o female with PMH of HTN, HLD, ESRD on HD (M/W/F), DM-2 came to the ED complaining of low back pain. Pain has been going on for few days; described as aching, radiating to both thighs anteriorly. Patient reported chronic back pain but in the past few days it has worsened; bending forward/sitting on the side exacerbate the pain.       #Acute on chronic low back pain   CT lumbar degenerative disc changes at L4-L5 concerning for discitis/OM   Ortho spine consulted, rec MRI w/wo.   MRI to be coordinated with radiology and nephrology given hx of ESRD.   Will hold antibiotic for now pending MR. Patient non-toxic, no leukocytosis, afebrile.   Pain control     #Right knee pain   XR knee: lateral prosthetic patellar dislocation  Seen by ortho  Pain control   PT eval     #ESRD   On HD (M/W/F)   Nephrology consulted   Monitor BMP     #HTN/HLD/CAD   Plavix 75mg   Simvastatin 40mg   Labetalol 200mg bid   Hydralazine 50mg   Aspirin     #Hyperglycemia with DM-2   Patient not on medication   HbA1C: 5.5 (07/24/23)   Insulin sliding scale   Will check HbA1C with AM lab   Lifestyle modification     #Supportive   DVT prophylaxis: Heparin sc  Diet: renal       Dispo: when stable, home.

## 2025-02-17 NOTE — PROGRESS NOTE ADULT - SUBJECTIVE AND OBJECTIVE BOX
Hospitalist Progress Note    Chief Complaint:  Back Pain    SUBJECTIVE / OVERNIGHT EVENTS:  No events overnight, patient seen at bedside, in NAD, no new complaints today. Patient denies chest pain, SOB, abd pain, N/V, fever, chills, dysuria or any other complaints. All remainder ROS negative.     MEDICATIONS  (STANDING):  aspirin enteric coated 81 milliGRAM(s) Oral daily  atorvastatin 20 milliGRAM(s) Oral at bedtime  clopidogrel Tablet 75 milliGRAM(s) Oral daily  darbepoetin Injectable ViaL 40 MICROGram(s) IV Push every 7 days  dextrose 5%. 1000 milliLiter(s) (100 mL/Hr) IV Continuous <Continuous>  dextrose 5%. 1000 milliLiter(s) (50 mL/Hr) IV Continuous <Continuous>  dextrose 50% Injectable 25 Gram(s) IV Push once  dextrose 50% Injectable 12.5 Gram(s) IV Push once  dextrose 50% Injectable 25 Gram(s) IV Push once  folic acid 1 milliGRAM(s) Oral daily  glucagon  Injectable 1 milliGRAM(s) IntraMuscular once  heparin   Injectable 5000 Unit(s) SubCutaneous every 8 hours  hydrALAZINE 50 milliGRAM(s) Oral three times a day  insulin lispro (ADMELOG) corrective regimen sliding scale   SubCutaneous three times a day before meals  insulin lispro (ADMELOG) corrective regimen sliding scale   SubCutaneous at bedtime  labetalol 200 milliGRAM(s) Oral two times a day  montelukast 10 milliGRAM(s) Oral daily  senna 2 Tablet(s) Oral at bedtime    MEDICATIONS  (PRN):  acetaminophen     Tablet .. 650 milliGRAM(s) Oral every 6 hours PRN Temp greater or equal to 38C (100.4F), Mild Pain (1 - 3)  albuterol    90 MICROgram(s) HFA Inhaler 2 Puff(s) Inhalation every 6 hours PRN for shortness of breath and/or wheezing  aluminum hydroxide/magnesium hydroxide/simethicone Suspension 30 milliLiter(s) Oral every 4 hours PRN Dyspepsia  dextrose Oral Gel 15 Gram(s) Oral once PRN Blood Glucose LESS THAN 70 milliGRAM(s)/deciliter  HYDROmorphone  Injectable 0.2 milliGRAM(s) IV Push every 6 hours PRN Moderate Pain (4 - 6)  HYDROmorphone  Injectable 0.5 milliGRAM(s) IV Push every 6 hours PRN Severe Pain (7 - 10)  melatonin 3 milliGRAM(s) Oral at bedtime PRN Insomnia  naloxone Injectable 1 milliGRAM(s) IV Push once PRN For Opioid OD  ondansetron Injectable 4 milliGRAM(s) IV Push every 8 hours PRN Nausea and/or Vomiting        I&O's Summary      PHYSICAL EXAM:  Vital Signs Last 24 Hrs  T(C): 36.7 (17 Feb 2025 07:31), Max: 36.8 (16 Feb 2025 11:14)  T(F): 98.1 (17 Feb 2025 07:31), Max: 98.3 (16 Feb 2025 11:14)  HR: 84 (17 Feb 2025 07:31) (68 - 85)  BP: 182/98 (17 Feb 2025 07:31) (106/57 - 182/98)  BP(mean): 126 (17 Feb 2025 07:31) (73 - 126)  RR: 18 (17 Feb 2025 07:31) (17 - 18)  SpO2: 100% (17 Feb 2025 07:31) (94% - 100%)    Parameters below as of 17 Feb 2025 07:31  Patient On (Oxygen Delivery Method): room air            CONSTITUTIONAL: NAD,  well-groomed  HEENMT: NC/AT, PERRLA, EOMI  RESPIRATORY: BLAE, No adventitious Lungs Sounds  CARDIOVASCULAR: S1/S2+, RRR, no MRG  ABDOMEN: Soft, NT/ND, BS+  MSK:  Moves limbs with purpose and direction; No edema noted  NEUROLOGY: AAOx4, CN 2-12 are intact and symmetric; no gross sensory deficits;   SKIN: Warm, Dry, AV graft on LUE, with palpable thrills.    LABS:                        9.2    4.47  )-----------( 304      ( 17 Feb 2025 05:20 )             30.7     02-17    138  |  97  |  53.4[H]  ----------------------------<  91  4.6   |  24.0  |  8.75[H]    Ca    8.4      17 Feb 2025 05:20  Mg     2.3     02-17            Urinalysis Basic - ( 17 Feb 2025 05:20 )    Color: x / Appearance: x / SG: x / pH: x  Gluc: 91 mg/dL / Ketone: x  / Bili: x / Urobili: x   Blood: x / Protein: x / Nitrite: x   Leuk Esterase: x / RBC: x / WBC x   Sq Epi: x / Non Sq Epi: x / Bacteria: x        Culture - Blood (collected 16 Feb 2025 00:20)  Source: .Blood BLOOD  Preliminary Report (17 Feb 2025 07:01):    No growth at 24 hours      CAPILLARY BLOOD GLUCOSE      POCT Blood Glucose.: 189 mg/dL (17 Feb 2025 08:14)  POCT Blood Glucose.: 184 mg/dL (16 Feb 2025 22:15)  POCT Blood Glucose.: 104 mg/dL (16 Feb 2025 12:34)        RADIOLOGY & ADDITIONAL TESTS:  Results Reviewed: Y  Imaging Personally Reviewed: N  Electrocardiogram Personally Reviewed: ADRIAN

## 2025-02-17 NOTE — PROGRESS NOTE ADULT - SUBJECTIVE AND OBJECTIVE BOX
Reason for visit: ESRD    Subjective: No acute overnight event. Patient denied any cardiac or urinary complains. No fever/chills.   Seen in ER, scheduled for HD later today.     ROS: All systems were reviewed in detail pertinent positive and negative mentioned above, rest are negative.    Physical Exam:  Gen: no acute distress  MS: alert, conversing normally  Eyes: EOMI, no icterus  HENT: NCAT, MMM  CV: rhythm reg reg, rate normal, no m/g/r  Chest: CTAB, no w/r/r,  Abd: soft, NT, ND  Extremities: No edema    =======================================================  Vital Signs Last 24 Hrs  T(C): 36.7 (17 Feb 2025 11:14), Max: 36.8 (16 Feb 2025 18:57)  T(F): 98.1 (17 Feb 2025 11:14), Max: 98.2 (16 Feb 2025 18:57)  HR: 68 (17 Feb 2025 11:14) (68 - 85)  BP: 149/70 (17 Feb 2025 11:14) (106/57 - 182/98)  BP(mean): 126 (17 Feb 2025 07:31) (73 - 126)  RR: 18 (17 Feb 2025 11:14) (17 - 18)  SpO2: 100% (17 Feb 2025 11:14) (94% - 100%)    Parameters below as of 17 Feb 2025 11:14  Patient On (Oxygen Delivery Method): room air      I&O's Summary    =======================================================  Current Antibiotics:    Other medications:  aspirin enteric coated 81 milliGRAM(s) Oral daily  atorvastatin 20 milliGRAM(s) Oral at bedtime  clopidogrel Tablet 75 milliGRAM(s) Oral daily  darbepoetin Injectable ViaL 40 MICROGram(s) IV Push every 7 days  dextrose 5%. 1000 milliLiter(s) IV Continuous <Continuous>  dextrose 5%. 1000 milliLiter(s) IV Continuous <Continuous>  dextrose 50% Injectable 25 Gram(s) IV Push once  dextrose 50% Injectable 12.5 Gram(s) IV Push once  dextrose 50% Injectable 25 Gram(s) IV Push once  folic acid 1 milliGRAM(s) Oral daily  glucagon  Injectable 1 milliGRAM(s) IntraMuscular once  heparin   Injectable 5000 Unit(s) SubCutaneous every 8 hours  hydrALAZINE 50 milliGRAM(s) Oral three times a day  insulin lispro (ADMELOG) corrective regimen sliding scale   SubCutaneous three times a day before meals  insulin lispro (ADMELOG) corrective regimen sliding scale   SubCutaneous at bedtime  labetalol 200 milliGRAM(s) Oral two times a day  montelukast 10 milliGRAM(s) Oral daily  senna 2 Tablet(s) Oral at bedtime    =======================================================  02-17    138  |  97  |  53.4[H]  ----------------------------<  91  4.6   |  24.0  |  8.75[H]    Ca    8.4      17 Feb 2025 05:20  Mg     2.3     02-17      Creatinine: 8.75 mg/dL (02-17-25 @ 05:20)  Creatinine: 7.34 mg/dL (02-16-25 @ 10:55)  Creatinine: 6.37 mg/dL (02-15-25 @ 21:30)    =======================================================

## 2025-02-17 NOTE — PROGRESS NOTE ADULT - ASSESSMENT
68 yo female with history of HTN, HLD, ESRD on HD (M/W/F), DM-2 came to ED complaints of lower back pain and lower extremity weakness. Her CT of L-spine showed destructive process centered at the L4-5 disc space with endplate erosions, sclerosis, and grade 1 anterolisthesis, she was seen by Orthopedics, who recommended to do MRI of the spine with iv contrast to rule out discitis/osteomyelitis of the L-spine.   Nephrology was consulted for HD needs.     - ESRD on HD at [facility] unit MWF  HD access RUE AVG  Fluid status euvolemic  - HTN BP is uncontrolled, could be due to pain   - Anemia of CKD - Hb is below goal   - CKD MDB Hyperphosphatemia   - Secondary hyperparathyroidism   - Lower back pain, with destructive lesions in her spine, rule out spinal metastasis    Plan   HD today   Please arrange for HD immediately after MRI with iv contrast   Patient will need 3 consecutive session after MRI with iv contrast   HD today 3 hrs  ml/min 3 K bath Goal UF 1-2L as tolerated by BP  Dose medications for HD   Aranesp 40 mg weekly with HD   Rest of management as per primary team   Discussed with primary team  Will follow

## 2025-02-17 NOTE — PROGRESS NOTE ADULT - SUBJECTIVE AND OBJECTIVE BOX
Pt Name: EULOGIO NIELSEN  MRN: [id number]    Patient is a 70 yo female with low back pain.   Patient reports chronic back pain x 2 years that has been worsening over the last few days.   CT lumbar spine showing degenerative disc changes L4-L5.   Denies recent fever, chills, SOB, CP, numbness, tingling, motor weakness.  no new complaints    REVIEW OF SYSTEMS  General: Alert, responsive, in NAD  Musculoskeletal: SEE HPI.  Neurological: No sensory or motor changes.   Endocrine: No Hx of diabetes.    PAST MEDICAL & SURGICAL HISTORY:  CAD (coronary artery disease)  CKD (chronic kidney disease) requiring chronic dialysis  Type 2 diabetes mellitus  Diabetic retinopathy associated with diabetes mellitus due to underlying condition  Hypertension  HLD (hyperlipidemia)  CVA (cerebral vascular accident)    Asthma  S/P CABG (coronary artery bypass graft)  S/P coronary artery stent placement  (1)   S/P cataract extraction  OS with retained lens fragment 7/15/15  Acute hemodialysis patient  nothing in the left arm awaiting AV fistula placement - temporary cath in right upper chest quadrant  S/P  section  S/P cholecystectomy    Allergies: lip swelling with lobster (Swelling)  erythromycin (Vomiting)  shellfish (Swelling; Hives)    PHYSICAL EXAM:    Vital Signs Last 24 Hrs  T(C): 36.7 (2025 11:14), Max: 36.8 (2025 18:57)  T(F): 98.1 (2025 11:14), Max: 98.2 (2025 18:57)  HR: 68 (2025 11:14) (68 - 85)  BP: 149/70 (2025 11:14) (106/57 - 182/98)  BP(mean): 126 (2025 07:31) (73 - 126)  RR: 18 (2025 11:14) (17 - 18)  SpO2: 100% (2025 11:14) (94% - 100%)    Parameters below as of 2025 11:14  Patient On (Oxygen Delivery Method): room air    Appearance: Alert, responsive, in no acute distress.  Vascular     Lower Extremities: palpable distal pulses. Cap refill < 2 sec.  Musculoskeletal:        Lower Extremities: atraumatic with normal alignment, NROM of b/l knees and ankles, +DF/PF. No crepitus. No bony tenderness. 5/5 motor strength without deficit  Neuro:     Lower Extremities neurovascular intact, sensation intact grossly to light touch throughout. No focal deficits or weaknesses found.    A/P:  Pt is a 69F w/ low back pain    PLAN:  * Case and plan discussed with Dr. Wilkins  * Pain control  * MRI w/ and w/out contrast needed--will need setup prior to HD  * Treatment plan to be finalized after review of pending imaging studies

## 2025-02-18 LAB
ANION GAP SERPL CALC-SCNC: 14 MMOL/L — SIGNIFICANT CHANGE UP (ref 5–17)
BASOPHILS # BLD AUTO: 0.02 K/UL — SIGNIFICANT CHANGE UP (ref 0–0.2)
BASOPHILS NFR BLD AUTO: 0.4 % — SIGNIFICANT CHANGE UP (ref 0–2)
BUN SERPL-MCNC: 29.1 MG/DL — HIGH (ref 8–20)
CALCIUM SERPL-MCNC: 9 MG/DL — SIGNIFICANT CHANGE UP (ref 8.4–10.5)
CHLORIDE SERPL-SCNC: 95 MMOL/L — LOW (ref 96–108)
CO2 SERPL-SCNC: 26 MMOL/L — SIGNIFICANT CHANGE UP (ref 22–29)
CREAT SERPL-MCNC: 5.56 MG/DL — HIGH (ref 0.5–1.3)
CRP SERPL-MCNC: 10 MG/L — HIGH
EGFR: 8 ML/MIN/1.73M2 — LOW
EGFR: 8 ML/MIN/1.73M2 — LOW
EOSINOPHIL # BLD AUTO: 0.11 K/UL — SIGNIFICANT CHANGE UP (ref 0–0.5)
EOSINOPHIL NFR BLD AUTO: 2.2 % — SIGNIFICANT CHANGE UP (ref 0–6)
GLUCOSE BLDC GLUCOMTR-MCNC: 101 MG/DL — HIGH (ref 70–99)
GLUCOSE BLDC GLUCOMTR-MCNC: 106 MG/DL — HIGH (ref 70–99)
GLUCOSE BLDC GLUCOMTR-MCNC: 143 MG/DL — HIGH (ref 70–99)
GLUCOSE BLDC GLUCOMTR-MCNC: 147 MG/DL — HIGH (ref 70–99)
GLUCOSE SERPL-MCNC: 94 MG/DL — SIGNIFICANT CHANGE UP (ref 70–99)
HCT VFR BLD CALC: 31.6 % — LOW (ref 34.5–45)
HGB BLD-MCNC: 9.8 G/DL — LOW (ref 11.5–15.5)
IMM GRANULOCYTES # BLD AUTO: 0.02 K/UL — SIGNIFICANT CHANGE UP (ref 0–0.07)
IMM GRANULOCYTES NFR BLD AUTO: 0.4 % — SIGNIFICANT CHANGE UP (ref 0–0.9)
LYMPHOCYTES # BLD AUTO: 1.17 K/UL — SIGNIFICANT CHANGE UP (ref 1–3.3)
LYMPHOCYTES NFR BLD AUTO: 23 % — SIGNIFICANT CHANGE UP (ref 13–44)
MAGNESIUM SERPL-MCNC: 2.3 MG/DL — SIGNIFICANT CHANGE UP (ref 1.6–2.6)
MCHC RBC-ENTMCNC: 27.8 PG — SIGNIFICANT CHANGE UP (ref 27–34)
MCHC RBC-ENTMCNC: 31 G/DL — LOW (ref 32–36)
MCV RBC AUTO: 89.5 FL — SIGNIFICANT CHANGE UP (ref 80–100)
MONOCYTES # BLD AUTO: 0.61 K/UL — SIGNIFICANT CHANGE UP (ref 0–0.9)
MONOCYTES NFR BLD AUTO: 12 % — SIGNIFICANT CHANGE UP (ref 2–14)
MRSA PCR RESULT.: SIGNIFICANT CHANGE UP
NEUTROPHILS # BLD AUTO: 3.15 K/UL — SIGNIFICANT CHANGE UP (ref 1.8–7.4)
NEUTROPHILS NFR BLD AUTO: 62 % — SIGNIFICANT CHANGE UP (ref 43–77)
NRBC # BLD AUTO: 0 K/UL — SIGNIFICANT CHANGE UP (ref 0–0)
NRBC # FLD: 0 K/UL — SIGNIFICANT CHANGE UP (ref 0–0)
NRBC BLD AUTO-RTO: 0 /100 WBCS — SIGNIFICANT CHANGE UP (ref 0–0)
PLATELET # BLD AUTO: 312 K/UL — SIGNIFICANT CHANGE UP (ref 150–400)
PMV BLD: 10.7 FL — SIGNIFICANT CHANGE UP (ref 7–13)
POTASSIUM SERPL-MCNC: 4.7 MMOL/L — SIGNIFICANT CHANGE UP (ref 3.5–5.3)
POTASSIUM SERPL-SCNC: 4.7 MMOL/L — SIGNIFICANT CHANGE UP (ref 3.5–5.3)
RBC # BLD: 3.53 M/UL — LOW (ref 3.8–5.2)
RBC # FLD: 15 % — HIGH (ref 10.3–14.5)
S AUREUS DNA NOSE QL NAA+PROBE: SIGNIFICANT CHANGE UP
SODIUM SERPL-SCNC: 135 MMOL/L — SIGNIFICANT CHANGE UP (ref 135–145)
WBC # BLD: 5.08 K/UL — SIGNIFICANT CHANGE UP (ref 3.8–10.5)
WBC # FLD AUTO: 5.08 K/UL — SIGNIFICANT CHANGE UP (ref 3.8–10.5)

## 2025-02-18 PROCEDURE — G0545: CPT

## 2025-02-18 PROCEDURE — 99223 1ST HOSP IP/OBS HIGH 75: CPT

## 2025-02-18 PROCEDURE — 90935 HEMODIALYSIS ONE EVALUATION: CPT

## 2025-02-18 PROCEDURE — 99233 SBSQ HOSP IP/OBS HIGH 50: CPT

## 2025-02-18 RX ORDER — ACETAMINOPHEN 500 MG/5ML
975 LIQUID (ML) ORAL EVERY 8 HOURS
Refills: 0 | Status: COMPLETED | OUTPATIENT
Start: 2025-02-18 | End: 2025-02-21

## 2025-02-18 RX ORDER — HYDROMORPHONE/SOD CHLOR,ISO/PF 2 MG/10 ML
0.5 SYRINGE (ML) INJECTION EVERY 4 HOURS
Refills: 0 | Status: DISCONTINUED | OUTPATIENT
Start: 2025-02-18 | End: 2025-02-25

## 2025-02-18 RX ORDER — HYDROMORPHONE/SOD CHLOR,ISO/PF 2 MG/10 ML
0.5 SYRINGE (ML) INJECTION EVERY 4 HOURS
Refills: 0 | Status: DISCONTINUED | OUTPATIENT
Start: 2025-02-18 | End: 2025-02-19

## 2025-02-18 RX ORDER — HYDROMORPHONE/SOD CHLOR,ISO/PF 2 MG/10 ML
0.2 SYRINGE (ML) INJECTION EVERY 6 HOURS
Refills: 0 | Status: DISCONTINUED | OUTPATIENT
Start: 2025-02-18 | End: 2025-02-19

## 2025-02-18 RX ADMIN — Medication 0.5 MILLIGRAM(S): at 12:20

## 2025-02-18 RX ADMIN — HEPARIN SODIUM 5000 UNIT(S): 1000 INJECTION INTRAVENOUS; SUBCUTANEOUS at 15:27

## 2025-02-18 RX ADMIN — HEPARIN SODIUM 5000 UNIT(S): 1000 INJECTION INTRAVENOUS; SUBCUTANEOUS at 08:26

## 2025-02-18 RX ADMIN — Medication 1 APPLICATION(S): at 11:44

## 2025-02-18 RX ADMIN — ATORVASTATIN CALCIUM 20 MILLIGRAM(S): 80 TABLET, FILM COATED ORAL at 00:04

## 2025-02-18 RX ADMIN — Medication 0.5 MILLIGRAM(S): at 11:49

## 2025-02-18 RX ADMIN — Medication 0.5 MILLIGRAM(S): at 19:42

## 2025-02-18 RX ADMIN — LABETALOL HYDROCHLORIDE 200 MILLIGRAM(S): 200 TABLET, FILM COATED ORAL at 05:30

## 2025-02-18 RX ADMIN — Medication 50 MILLIGRAM(S): at 05:30

## 2025-02-18 RX ADMIN — Medication 50 MILLIGRAM(S): at 15:28

## 2025-02-18 RX ADMIN — Medication 0.5 MILLIGRAM(S): at 09:20

## 2025-02-18 RX ADMIN — Medication 2 TABLET(S): at 00:03

## 2025-02-18 RX ADMIN — CLOPIDOGREL BISULFATE 75 MILLIGRAM(S): 75 TABLET, FILM COATED ORAL at 15:28

## 2025-02-18 RX ADMIN — Medication 81 MILLIGRAM(S): at 15:28

## 2025-02-18 RX ADMIN — ATORVASTATIN CALCIUM 20 MILLIGRAM(S): 80 TABLET, FILM COATED ORAL at 21:16

## 2025-02-18 RX ADMIN — Medication 975 MILLIGRAM(S): at 21:17

## 2025-02-18 RX ADMIN — HEPARIN SODIUM 5000 UNIT(S): 1000 INJECTION INTRAVENOUS; SUBCUTANEOUS at 00:03

## 2025-02-18 RX ADMIN — LABETALOL HYDROCHLORIDE 200 MILLIGRAM(S): 200 TABLET, FILM COATED ORAL at 17:11

## 2025-02-18 RX ADMIN — Medication 975 MILLIGRAM(S): at 22:17

## 2025-02-18 RX ADMIN — Medication 50 MILLIGRAM(S): at 21:16

## 2025-02-18 RX ADMIN — MONTELUKAST SODIUM 10 MILLIGRAM(S): 10 TABLET ORAL at 15:27

## 2025-02-18 RX ADMIN — HEPARIN SODIUM 5000 UNIT(S): 1000 INJECTION INTRAVENOUS; SUBCUTANEOUS at 23:40

## 2025-02-18 RX ADMIN — Medication 0.5 MILLIGRAM(S): at 20:42

## 2025-02-18 RX ADMIN — Medication 975 MILLIGRAM(S): at 17:08

## 2025-02-18 RX ADMIN — Medication 0.5 MILLIGRAM(S): at 08:25

## 2025-02-18 RX ADMIN — Medication 975 MILLIGRAM(S): at 15:27

## 2025-02-18 NOTE — PROGRESS NOTE ADULT - ASSESSMENT
68 y/o female with PMH of HTN, HLD, ESRD on HD (M/W/F), DM-2 came to the ED complaining of low back pain. Pain has been going on for few days; described as aching, radiating to both thighs anteriorly. Patient reported chronic back pain but in the past few days it has worsened; bending forward/sitting on the side exacerbate the pain.       #Acute on chronic low back pain   #Discitis/OM of L4-5  CT lumbar degenerative disc changes at L4-L5 concerning for discitis/OM   Ortho spine on board   MRI with OM/Discitis of L4/5  Will hold antibiotic for now   Blood cx neg x 1  ID consult  Pain management consult  Pain control   Bedrest per ortho    #Right knee pain   XR knee: lateral prosthetic patellar dislocation  Seen by ortho  Pain control   PT eval     #ESRD   On HD (M/W/F)   Nephrology consulted   Monitor BMP     #HTN/HLD/CAD   Plavix 75mg   Simvastatin 40mg   Labetalol 200mg bid   Hydralazine 50mg   Aspirin     #Hyperglycemia with DM-2   Patient not on medication   HbA1C: 5.5 (07/24/23)   Insulin sliding scale   A1C of 6.9  Lifestyle modification     #Supportive   DVT prophylaxis: Heparin sc  Diet: renal     Dispo: Will need PT eval after Ortho optimization

## 2025-02-18 NOTE — PROGRESS NOTE ADULT - SUBJECTIVE AND OBJECTIVE BOX
EULOGIO NIELSEN    [id number]    History: 69y Female with L4-5 discitis.  The patient is complaining of moderate lower back pain radiating into her b/l hips/thighs. The patient's pain is controlled using the prescribed pain medications. The patient is participating in physical therapy. Denies nausea, vomiting, chest pain, shortness of breath, abdominal pain or fever.ts.    Vital Signs Last 24 Hrs  T(C): 36.8 (2025 08:32), Max: 37 (2025 19:24)  T(F): 98.2 (2025 08:32), Max: 98.6 (2025 19:24)  HR: 80 (2025 08:32) (68 - 88)  BP: 163/70 (2025 08:32) (127/72 - 177/51)  BP(mean): 90 (2025 19:24) (90 - 91)  RR: 18 (2025 08:32) (18 - 18)  SpO2: 92% (2025 08:32) (92% - 100%)    Parameters below as of 2025 08:32  Patient On (Oxygen Delivery Method): room air                              9.8    5.08  )-----------( 312      ( 2025 05:11 )             31.6         135  |  95[L]  |  29.1[H]  ----------------------------<  94  4.7   |  26.0  |  5.56[H]    Ca    9.0      2025 05:11  Mg     2.3     -        MEDICATIONS  (STANDING):  aspirin enteric coated 81 milliGRAM(s) Oral daily  atorvastatin 20 milliGRAM(s) Oral at bedtime  chlorhexidine 2% Cloths 1 Application(s) Topical daily  clopidogrel Tablet 75 milliGRAM(s) Oral daily  darbepoetin Injectable ViaL 40 MICROGram(s) IV Push every 7 days  dextrose 5%. 1000 milliLiter(s) (100 mL/Hr) IV Continuous <Continuous>  dextrose 5%. 1000 milliLiter(s) (50 mL/Hr) IV Continuous <Continuous>  dextrose 50% Injectable 25 Gram(s) IV Push once  dextrose 50% Injectable 12.5 Gram(s) IV Push once  dextrose 50% Injectable 25 Gram(s) IV Push once  folic acid 1 milliGRAM(s) Oral daily  glucagon  Injectable 1 milliGRAM(s) IntraMuscular once  heparin   Injectable 5000 Unit(s) SubCutaneous every 8 hours  hydrALAZINE 50 milliGRAM(s) Oral three times a day  insulin lispro (ADMELOG) corrective regimen sliding scale   SubCutaneous three times a day before meals  insulin lispro (ADMELOG) corrective regimen sliding scale   SubCutaneous at bedtime  labetalol 200 milliGRAM(s) Oral two times a day  montelukast 10 milliGRAM(s) Oral daily  senna 2 Tablet(s) Oral at bedtime    MEDICATIONS  (PRN):  acetaminophen     Tablet .. 650 milliGRAM(s) Oral every 6 hours PRN Temp greater or equal to 38C (100.4F), Mild Pain (1 - 3)  albuterol    90 MICROgram(s) HFA Inhaler 2 Puff(s) Inhalation every 6 hours PRN for shortness of breath and/or wheezing  aluminum hydroxide/magnesium hydroxide/simethicone Suspension 30 milliLiter(s) Oral every 4 hours PRN Dyspepsia  dextrose Oral Gel 15 Gram(s) Oral once PRN Blood Glucose LESS THAN 70 milliGRAM(s)/deciliter  HYDROmorphone  Injectable 0.2 milliGRAM(s) IV Push every 6 hours PRN Moderate Pain (4 - 6)  HYDROmorphone  Injectable 0.5 milliGRAM(s) IV Push every 6 hours PRN Severe Pain (7 - 10)  melatonin 3 milliGRAM(s) Oral at bedtime PRN Insomnia  naloxone Injectable 1 milliGRAM(s) IV Push once PRN For Opioid OD  ondansetron Injectable 4 milliGRAM(s) IV Push every 8 hours PRN Nausea and/or Vomiting      Vital Signs Last 24 Hrs  T(C): 36.8 (2025 08:32), Max: 37 (2025 19:24)  T(F): 98.2 (2025 08:32), Max: 98.6 (2025 19:24)  HR: 80 (2025 08:32) (68 - 88)  BP: 163/70 (2025 08:32) (127/72 - 177/51)  BP(mean): 90 (2025 19:24) (90 - 91)  RR: 18 (2025 08:32) (18 - 18)  SpO2: 92% (2025 08:32) (92% - 100%)    Parameters below as of 2025 08:32  Patient On (Oxygen Delivery Method): room air      Daily Height in cm: 170.18 (2025 23:45)    Daily Weight in k.5 (2025 19:40)    Appearance: Alert, responsive, in no acute distress.    Skin: no rash on visible skin. Skin is clean, dry and intact. No bleeding. No abrasions. No ulcerations.    Musculoskeletal:         Lumbar: dressing clean, dry, intact. +HV       Left Lower Extremity: Sensation is grossly intact to light touch. 2+ distal pulses. Cap refill < 2 sec.        Right Lower Extremity: Sensation is grossly intact to light touch. 2+ distal pulses. Cap refill < 2 sec.       Motor exam: [  ]             [ ] Lower extremity                    HF(l2)    KE(l3)   TA(l4)  EHL(l5)    GS(s1)                                                 R        5/5        5/5        4/5       4/5         5/5                                               L         5/5        5/5       5/5       5/5          5/5    Primary Orthopedic Assessment:  • 69y Female wit L 4-5 discitis    Secondary  Orthopedic Assessment(s):   •     Secondary  Medical Assessment(s):   •     Plan:   • DVT prophylaxis as prescribed, including use of compression devices and ankle pumps  • Continue physical therapy  • Out of Bed as tolerated with assistance of a walker  • Incentive spirometry encouraged  • Pain control as clinically indicated  • Abx as per ID  • MRI lumbar spine performed - f/u Dr. Wilkins plan

## 2025-02-18 NOTE — PROGRESS NOTE ADULT - SUBJECTIVE AND OBJECTIVE BOX
Reason for visit: ESRD    Subjective: No acute overnight event. Patient denied any cardiac or urinary complains. No fever/chills.   Seen on HD this AM  Received MRI w/ [facility] last evening followed by HD  C/o pain in left hip and leg    ROS: All systems were reviewed in detail pertinent positive and negative mentioned above, rest are negative.    Physical Exam:  Gen: no acute distress  MS: alert, conversing normally  Eyes: EOMI, no icterus  HENT: NCAT, MMM  CV: rhythm reg reg, rate normal, no m/g/r  Chest: CTAB, no w/r/r,  Abd: soft, NT, ND  Extremities: No edema    Vital Signs Last 24 Hrs  T(C): 36.6 (18 Feb 2025 09:45), Max: 37 (17 Feb 2025 19:24)  T(F): 97.8 (18 Feb 2025 09:45), Max: 98.6 (17 Feb 2025 19:24)  HR: 72 (18 Feb 2025 09:45) (68 - 88)  BP: 146/57 (18 Feb 2025 09:45) (127/72 - 177/51)  BP(mean): 90 (17 Feb 2025 19:24) (90 - 91)  RR: 18 (18 Feb 2025 09:45) (18 - 18)  SpO2: 95% (18 Feb 2025 09:45) (92% - 100%)    Parameters below as of 18 Feb 2025 09:45  Patient On (Oxygen Delivery Method): room air      I&O's Summary    17 Feb 2025 07:01  -  18 Feb 2025 07:00  --------------------------------------------------------  IN: 50 mL / OUT: 1500 mL / NET: -1450 mL      Current Antibiotics:    Other medications:  acetaminophen     Tablet .. 975 milliGRAM(s) Oral every 8 hours  aspirin enteric coated 81 milliGRAM(s) Oral daily  atorvastatin 20 milliGRAM(s) Oral at bedtime  chlorhexidine 2% Cloths 1 Application(s) Topical daily  clopidogrel Tablet 75 milliGRAM(s) Oral daily  darbepoetin Injectable ViaL 40 MICROGram(s) IV Push every 7 days  dextrose 5%. 1000 milliLiter(s) IV Continuous <Continuous>  dextrose 5%. 1000 milliLiter(s) IV Continuous <Continuous>  dextrose 50% Injectable 25 Gram(s) IV Push once  dextrose 50% Injectable 12.5 Gram(s) IV Push once  dextrose 50% Injectable 25 Gram(s) IV Push once  folic acid 1 milliGRAM(s) Oral daily  glucagon  Injectable 1 milliGRAM(s) IntraMuscular once  heparin   Injectable 5000 Unit(s) SubCutaneous every 8 hours  hydrALAZINE 50 milliGRAM(s) Oral three times a day  insulin lispro (ADMELOG) corrective regimen sliding scale   SubCutaneous three times a day before meals  insulin lispro (ADMELOG) corrective regimen sliding scale   SubCutaneous at bedtime  labetalol 200 milliGRAM(s) Oral two times a day  montelukast 10 milliGRAM(s) Oral daily  senna 2 Tablet(s) Oral at bedtime    02-18    135  |  95[L]  |  29.1[H]  ----------------------------<  94  4.7   |  26.0  |  5.56[H]    Ca    9.0      18 Feb 2025 05:11  Mg     2.3     02-18      Creatinine: 5.56 mg/dL (02-18-25 @ 05:11)  Creatinine: 8.75 mg/dL (02-17-25 @ 05:20)  Creatinine: 7.34 mg/dL (02-16-25 @ 10:55)  Creatinine: 6.37 mg/dL (02-15-25 @ 21:30)                 9.8    5.08  )-----------( 312      ( 18 Feb 2025 05:11 )             31.6

## 2025-02-18 NOTE — CONSULT NOTE ADULT - SUBJECTIVE AND OBJECTIVE BOX
[facility]                                                INFECTIOUS DISEASES  =======================================================                     Pascual Lopez#   Declan Kevin MD#   Jing Ritter MD*                           Natalia Tamayo MD*   Sylvia Mo MD*  Iva Akbar*            Diplomates American Board of Internal Medicine & Infectious Diseases                  # [place] Office - Appt - Tel  [phone] Fax [phone]                * [place] Office - Appt - Tel [phone] Fax [phone]                                  Hospital Consult line:  [phone]  =======================================================      [facility]-[id number]  EULOGIO NIELSEN   HPI:  68 y/o female with PMH of HTN, HLD, ESRD on HD (M/W/F), DM-2 came to the ED complaining of low back pain. Pain has been going on for few days; described as aching, radiating to both thighs anteriorly. Patient reported chronic back pain but in the past few days it has worsened; bending forward/sitting on the side exacerbate the pain. She has no trauma to the back, fall, fever, chills, nausea, vomiting, abdominal pain, change in bowel habit, chest pain, shortness of breath.  (2025 03:24)          I have personally reviewed the labs and data; pertinent labs and data are listed in this note; please see below.   =======================================================  Past Medical & Surgical Hx:  =====================  PAST MEDICAL & SURGICAL HISTORY:  CAD (coronary artery disease)      CKD (chronic kidney disease) requiring chronic dialysis      Type 2 diabetes mellitus      Diabetic retinopathy associated with diabetes mellitus due to underlying condition      Hypertension      HLD (hyperlipidemia)      CVA (cerebral vascular accident)        Asthma      S/P CABG (coronary artery bypass graft)      S/P coronary artery stent placement  (1)       S/P cataract extraction  OS with retained lens fragment 7/15/15      Acute hemodialysis patient  nothing in the left arm awaiting AV fistula placement - temporary cath in right upper chest quadrant      S/P  section      S/P cholecystectomy        Problem List:  ==========  HEALTH ISSUES - PROBLEM Dx:        Social Hx:  =======  no toxic habits currently    FAMILY HISTORY:  FHx: breast cancer (Sibling)    no significant family history of immunosuppressive disorders in mother or father   =======================================================    REVIEW OF SYSTEMS:  CONSTITUTIONAL:  No Fever or chills  HEENT:  No diplopia or blurred vision.  No earache, sore throat or runny nose.  CARDIOVASCULAR:  No pressure, squeezing, strangling, tightness, heaviness or aching about the chest, neck, axilla or epigastrium.  RESPIRATORY:  No cough, shortness of breath  GASTROINTESTINAL:  No nausea, vomiting or diarrhea.  GENITOURINARY:  No dysuria, frequency or urgency. No Blood in urine  MUSCULOSKELETAL:  no joint aches, no muscle pain  SKIN:  No change in skin, hair or nails.  NEUROLOGIC:  No Headaches, seizures or weakness.  PSYCHIATRIC:  No disorder of thought or mood.  ENDOCRINE:  No heat or cold intolerance  HEMATOLOGICAL:  No easy bruising or bleeding.    =======================================================  Allergies    lip swelling with lobster (Swelling)  erythromycin (Vomiting)  shellfish (Swelling; Hives)    Intolerances    Antibiotics:    Other medications:  acetaminophen     Tablet .. 975 milliGRAM(s) Oral every 8 hours  aspirin enteric coated 81 milliGRAM(s) Oral daily  atorvastatin 20 milliGRAM(s) Oral at bedtime  chlorhexidine 2% Cloths 1 Application(s) Topical daily  clopidogrel Tablet 75 milliGRAM(s) Oral daily  darbepoetin Injectable ViaL 40 MICROGram(s) IV Push every 7 days  dextrose 5%. 1000 milliLiter(s) IV Continuous <Continuous>  dextrose 5%. 1000 milliLiter(s) IV Continuous <Continuous>  dextrose 50% Injectable 25 Gram(s) IV Push once  dextrose 50% Injectable 12.5 Gram(s) IV Push once  dextrose 50% Injectable 25 Gram(s) IV Push once  folic acid 1 milliGRAM(s) Oral daily  glucagon  Injectable 1 milliGRAM(s) IntraMuscular once  heparin   Injectable 5000 Unit(s) SubCutaneous every 8 hours  hydrALAZINE 50 milliGRAM(s) Oral three times a day  insulin lispro (ADMELOG) corrective regimen sliding scale   SubCutaneous three times a day before meals  insulin lispro (ADMELOG) corrective regimen sliding scale   SubCutaneous at bedtime  labetalol 200 milliGRAM(s) Oral two times a day  montelukast 10 milliGRAM(s) Oral daily  senna 2 Tablet(s) Oral at bedtime       ======================================================  Physical Exam:  ============  T(F): 97.8 (2025 09:45), Max: 98.6 (2025 19:24)  HR: 72 (2025 09:45)  BP: 146/57 (2025 09:45)  RR: 18 (2025 09:45)  SpO2: 95% (2025 09:45) (92% - 100%)  temp max in last 48H T(F): , Max: 98.6 (25 @ 19:24)Height (cm): 170.2 (25 @ 23:45)  Weight (kg): 86.8 (25 @ 09:45)  BMI (kg/m2): 30 (25 @ 09:45)  BSA (m2): 1.98 (25 @ 09:45)    General:  No acute distress.  Eye: Pupils are equal, round and reactive to light, Normal conjunctiva.  HENT: Normocephalic, Oral mucosa is moist, No pharyngeal erythema, No sinus tenderness.  Neck: Supple, No lymphadenopathy.  Respiratory: Lungs are clear to auscultation, Respirations are non-labored.  Cardiovascular: Normal rate, Regular rhythm, s1 + s2  Gastrointestinal: Soft, Non-tender, Non-distended, Normal bowel sounds.  Genitourinary: No costovertebral angle tenderness.  Lymphatics: No lymphadenopathy neck,   Musculoskeletal: Normal range of motion, Normal strength.  Integumentary: No rash.  Neurologic: Alert, Oriented, No focal deficits  Psychiatric: Appropriate mood & affect.    =======================================================  Labs:                        9.8    5.08  )-----------( 312      ( 2025 05:11 )             31.6         135  |  95[L]  |  29.1[H]  ----------------------------<  94  4.7   |  26.0  |  5.56[H]    Ca    9.0      2025 05:11  Mg     2.3             Culture - Blood (collected 25 @ 00:20)  Source: .Blood BLOOD  Preliminary Report (25 @ 07:01):    No growth at 48 Hours       SARS-CoV-2 Result: NotDete (02-15-25 @ 21:41)                                                   [facility]                                                INFECTIOUS DISEASES  =======================================================                     Pascual Lopez#   Declan Kevin MD#   Jing Ritter MD*                           Natalia Tamayo MD*   Sylvia Mo MD*  Iva Akbar*            Diplomates American Board of Internal Medicine & Infectious Diseases                  # [place] Office - Appt - Tel  [phone] Fax [phone]                * [place] Office - Appt - Tel [phone] Fax [phone]                                  Hospital Consult line:  [phone]  =======================================================      [facility]-[id number]  EULOGIO NIELSEN   HPI:  68 y/o female with PMH of HTN, HLD, ESRD on HD (M/W/F), DM-2 came to the ED complaining of low back pain. Pain has been going on for few days; described as aching, radiating to both thighs anteriorly. Patient reported chronic back pain but in the past few days it has worsened; bending forward/sitting on the side exacerbate the pain. She has no trauma to the back, fall, fever, chills, nausea, vomiting, abdominal pain, change in bowel habit, chest pain, shortness of breath.  (2025 03:24)          I have personally reviewed the labs and data; pertinent labs and data are listed in this note; please see below.   =======================================================  Past Medical & Surgical Hx:  =====================  PAST MEDICAL & SURGICAL HISTORY:  CAD (coronary artery disease)      CKD (chronic kidney disease) requiring chronic dialysis      Type 2 diabetes mellitus      Diabetic retinopathy associated with diabetes mellitus due to underlying condition      Hypertension      HLD (hyperlipidemia)      CVA (cerebral vascular accident)        Asthma      S/P CABG (coronary artery bypass graft)      S/P coronary artery stent placement  (1)       S/P cataract extraction  OS with retained lens fragment 7/15/15      Acute hemodialysis patient  nothing in the left arm awaiting AV fistula placement - temporary cath in right upper chest quadrant      S/P  section      S/P cholecystectomy        Problem List:  ==========  HEALTH ISSUES - PROBLEM Dx:        Social Hx:  =======  no toxic habits currently    FAMILY HISTORY:  FHx: breast cancer (Sibling)    no significant family history of immunosuppressive disorders in mother or father   =======================================================    REVIEW OF SYSTEMS:  CONSTITUTIONAL:  No Fever or chills  HEENT:  No diplopia or blurred vision.  No earache, sore throat or runny nose.  CARDIOVASCULAR:  No pressure, squeezing, strangling, tightness, heaviness or aching about the chest, neck, axilla or epigastrium.  RESPIRATORY:  No cough, shortness of breath  GASTROINTESTINAL:  No nausea, vomiting or diarrhea.  GENITOURINARY:  No dysuria, frequency or urgency. No Blood in urine  MUSCULOSKELETAL:  no joint aches, no muscle pain  SKIN:  No change in skin, hair or nails.  NEUROLOGIC:  No Headaches, seizures or weakness.  PSYCHIATRIC:  No disorder of thought or mood.  ENDOCRINE:  No heat or cold intolerance  HEMATOLOGICAL:  No easy bruising or bleeding.    =======================================================  Allergies    lip swelling with lobster (Swelling)  erythromycin (Vomiting)  shellfish (Swelling; Hives)    Intolerances    Antibiotics:    Other medications:  acetaminophen     Tablet .. 975 milliGRAM(s) Oral every 8 hours  aspirin enteric coated 81 milliGRAM(s) Oral daily  atorvastatin 20 milliGRAM(s) Oral at bedtime  chlorhexidine 2% Cloths 1 Application(s) Topical daily  clopidogrel Tablet 75 milliGRAM(s) Oral daily  darbepoetin Injectable ViaL 40 MICROGram(s) IV Push every 7 days  dextrose 5%. 1000 milliLiter(s) IV Continuous <Continuous>  dextrose 5%. 1000 milliLiter(s) IV Continuous <Continuous>  dextrose 50% Injectable 25 Gram(s) IV Push once  dextrose 50% Injectable 12.5 Gram(s) IV Push once  dextrose 50% Injectable 25 Gram(s) IV Push once  folic acid 1 milliGRAM(s) Oral daily  glucagon  Injectable 1 milliGRAM(s) IntraMuscular once  heparin   Injectable 5000 Unit(s) SubCutaneous every 8 hours  hydrALAZINE 50 milliGRAM(s) Oral three times a day  insulin lispro (ADMELOG) corrective regimen sliding scale   SubCutaneous three times a day before meals  insulin lispro (ADMELOG) corrective regimen sliding scale   SubCutaneous at bedtime  labetalol 200 milliGRAM(s) Oral two times a day  montelukast 10 milliGRAM(s) Oral daily  senna 2 Tablet(s) Oral at bedtime       ======================================================  Physical Exam:  ============  T(F): 97.8 (2025 09:45), Max: 98.6 (2025 19:24)  HR: 72 (2025 09:45)  BP: 146/57 (2025 09:45)  RR: 18 (2025 09:45)  SpO2: 95% (2025 09:45) (92% - 100%)  temp max in last 48H T(F): , Max: 98.6 (25 @ 19:24)Height (cm): 170.2 (25 @ 23:45)  Weight (kg): 86.8 (25 @ 09:45)  BMI (kg/m2): 30 (25 @ 09:45)  BSA (m2): 1.98 (25 @ 09:45)    General:  No acute distress.  Eye:  Normal conjunctiva.  Respiratory: Lungs are clear to auscultation, Respirations are non-labored.  Cardiovascular: Normal rate, Regular rhythm, s1 + s2  Gastrointestinal: Soft, Non-tender, Non-distended, Normal bowel sounds.  Musculoskeletal: Normal range of motion, Normal strength. lue avg  Integumentary: No rash. right knee scar and calf scar  Neurologic: Alert, Oriented, No focal deficits  Psychiatric: Appropriate mood & affect.    =======================================================  Labs:                        9.8    5.08  )-----------( 312      ( 2025 05:11 )             31.6         135  |  95[L]  |  29.1[H]  ----------------------------<  94  4.7   |  26.0  |  5.56[H]    Ca    9.0      2025 05:11  Mg     2.3             Culture - Blood (collected 25 @ 00:20)  Source: .Blood BLOOD  Preliminary Report (25 @ 07:01):    No growth at 48 Hours       SARS-CoV-2 Result: NotDetec (02-15-25 @ 21:41)       < from: MR Lumbar Spine w/wo IV Cont (25 @ 18:51) >  IMPRESSION:    1.  L4-5 discitis/osteomyelitis, recommend tissue sampling.  2.  Worsened L4-5 anterolisthesis with severe bilateral foraminal   narrowing.  3.  Correlate clinically to exclude instability.    < end of copied text >      < from: CT Pelvis Bony Only No Cont (02.15.25 @ 18:38) >    IMPRESSION:  1. Discitis/osteomyelitis at L4-L5 until proven otherwise with 5 mm   anterolisthesis. Recommend follow-up lumbar MRI.    2.   No acute displaced pelvic fracture.    --- End of Report ---    < end of copied text >

## 2025-02-18 NOTE — PROGRESS NOTE ADULT - SUBJECTIVE AND OBJECTIVE BOX
Hospitalist Daily Progress Note    Chief Complaint:  Patient is a 69y old  Female who presents with a chief complaint of     SUBJECTIVE / OVERNIGHT EVENTS:  Patient was seen and examined at bedside while receiving HD. Patient is complaining of severe right hip and back pain, that radiates down her thigh. She reports just receiving her pain meds.   Patient denies chest pain, SOB, abd pain, N/V, fever, chills, dysuria or any other complaints. All remainder ROS negative.     MEDICATIONS  (STANDING):  acetaminophen     Tablet .. 975 milliGRAM(s) Oral every 8 hours  aspirin enteric coated 81 milliGRAM(s) Oral daily  atorvastatin 20 milliGRAM(s) Oral at bedtime  chlorhexidine 2% Cloths 1 Application(s) Topical daily  clopidogrel Tablet 75 milliGRAM(s) Oral daily  darbepoetin Injectable ViaL 40 MICROGram(s) IV Push every 7 days  dextrose 5%. 1000 milliLiter(s) (100 mL/Hr) IV Continuous <Continuous>  dextrose 5%. 1000 milliLiter(s) (50 mL/Hr) IV Continuous <Continuous>  dextrose 50% Injectable 25 Gram(s) IV Push once  dextrose 50% Injectable 12.5 Gram(s) IV Push once  dextrose 50% Injectable 25 Gram(s) IV Push once  folic acid 1 milliGRAM(s) Oral daily  glucagon  Injectable 1 milliGRAM(s) IntraMuscular once  heparin   Injectable 5000 Unit(s) SubCutaneous every 8 hours  hydrALAZINE 50 milliGRAM(s) Oral three times a day  insulin lispro (ADMELOG) corrective regimen sliding scale   SubCutaneous three times a day before meals  insulin lispro (ADMELOG) corrective regimen sliding scale   SubCutaneous at bedtime  labetalol 200 milliGRAM(s) Oral two times a day  montelukast 10 milliGRAM(s) Oral daily  senna 2 Tablet(s) Oral at bedtime    MEDICATIONS  (PRN):  acetaminophen     Tablet .. 650 milliGRAM(s) Oral every 6 hours PRN Temp greater or equal to 38C (100.4F), Mild Pain (1 - 3)  albuterol    90 MICROgram(s) HFA Inhaler 2 Puff(s) Inhalation every 6 hours PRN for shortness of breath and/or wheezing  aluminum hydroxide/magnesium hydroxide/simethicone Suspension 30 milliLiter(s) Oral every 4 hours PRN Dyspepsia  dextrose Oral Gel 15 Gram(s) Oral once PRN Blood Glucose LESS THAN 70 milliGRAM(s)/deciliter  HYDROmorphone  Injectable 0.2 milliGRAM(s) IV Push every 6 hours PRN Moderate Pain (4 - 6)  HYDROmorphone  Injectable 0.5 milliGRAM(s) IV Push every 4 hours PRN Severe Pain (7 - 10)  HYDROmorphone  Injectable 0.5 milliGRAM(s) IV Push every 4 hours PRN Breakthrough pain  melatonin 3 milliGRAM(s) Oral at bedtime PRN Insomnia  naloxone Injectable 1 milliGRAM(s) IV Push once PRN For Opioid OD  ondansetron Injectable 4 milliGRAM(s) IV Push every 8 hours PRN Nausea and/or Vomiting        I&O's Summary    17 Feb 2025 07:01  -  18 Feb 2025 07:00  --------------------------------------------------------  IN: 50 mL / OUT: 1500 mL / NET: -1450 mL        PHYSICAL EXAM:  Vital Signs Last 24 Hrs  T(C): 36.6 (18 Feb 2025 09:45), Max: 37 (17 Feb 2025 19:24)  T(F): 97.8 (18 Feb 2025 09:45), Max: 98.6 (17 Feb 2025 19:24)  HR: 72 (18 Feb 2025 09:45) (68 - 88)  BP: 146/57 (18 Feb 2025 09:45) (127/72 - 177/51)  BP(mean): 90 (17 Feb 2025 19:24) (90 - 91)  RR: 18 (18 Feb 2025 09:45) (18 - 18)  SpO2: 95% (18 Feb 2025 09:45) (92% - 100%)    Parameters below as of 18 Feb 2025 09:45  Patient On (Oxygen Delivery Method): room air          Constitutional: NAD, Resting  ENT: Supple, No JVD  Lungs: CTA B/L, Non-labored breathing  Cardio: RRR, S1/S2, No murmur  Abdomen: Soft, Nontender, Nondistended; Bowel sounds present  Extremities: No calf tenderness, No pitting edema, LUE with AVF receiving HD  Musculoskeletal:   No joint swelling  Psych: Calm, cooperative affect appropriate  Neuro: Awake and alert, oriented x 4  Skin: No rashes; no palpable lesions    LABS:                        9.8    5.08  )-----------( 312      ( 18 Feb 2025 05:11 )             31.6     02-18    135  |  95[L]  |  29.1[H]  ----------------------------<  94  4.7   |  26.0  |  5.56[H]    Ca    9.0      18 Feb 2025 05:11  Mg     2.3     02-18            Urinalysis Basic - ( 18 Feb 2025 05:11 )    Color: x / Appearance: x / SG: x / pH: x  Gluc: 94 mg/dL / Ketone: x  / Bili: x / Urobili: x   Blood: x / Protein: x / Nitrite: x   Leuk Esterase: x / RBC: x / WBC x   Sq Epi: x / Non Sq Epi: x / Bacteria: x        Culture - Blood (collected 16 Feb 2025 00:20)  Source: .Blood BLOOD  Preliminary Report (18 Feb 2025 07:01):    No growth at 48 Hours      CAPILLARY BLOOD GLUCOSE      POCT Blood Glucose.: 106 mg/dL (18 Feb 2025 08:11)  POCT Blood Glucose.: 98 mg/dL (17 Feb 2025 21:53)  POCT Blood Glucose.: 127 mg/dL (17 Feb 2025 16:43)  POCT Blood Glucose.: 117 mg/dL (17 Feb 2025 11:53)        RADIOLOGY REVIEWED

## 2025-02-18 NOTE — PHYSICAL THERAPY INITIAL EVALUATION ADULT - GAIT TRAINING, PT EVAL
Time Frame: 2-3 days   Goal: Min assist with RW x 200 feet / Stairs: pt will navigate 10 stairs independently

## 2025-02-18 NOTE — ADVANCED PRACTICE NURSE CONSULT - RECOMMEDATIONS
continue monitoring wound sites for infection, though low suspicion at this time given wBC wdl and negative doppler. if further suspicion f/u with CT and consider vascular consult   consider offloading L foot if limited mobility in bed     1. healing neuropathic ulcer over L 1st metatarsal head-POA   2. L anterior shin healing venous ulcer    WOUNDS:  =======  LLE  cleanse with NS  apply xeroform cut to size over woud bed  suecre with allevyn foam dressing  change QD    L 1st metatarsal   paint with betainde QD, otherwise leave LUCA and consider oflfoading area       GENERAL GUIDELINES, AND RECOMMENDATIONS  ==============================  -Carefully check that no tubes are entangled with the bed linens or have contact to patient’s skin. Keep bed sheets smooth and wrinkle free to prevent injury to the skin   - Assure to position pt feet at 20 degrees, then HOB at a 30 degree angle  to limit sliding/friction/shearing, especially in bed bound populations.       Turn and position the patient Q2 hours. Use wedges when turning and positioning to the left/right, notably if patient can only assist minimally.  Keep the pt’s heels protected and elevated off the surface of the bed. Place what the patient can tolerate: pillows, moldable pillows, offloading heel boots    This was a 25  minute visit, with greater than 50% counseling. My findings and suggestions that may benefit the pt were disscussed with the family/caregiver if present at bedside and with pt permission,  shift RN at bedside, in addition to overseeing team/provider via chat and or telephone.     Attending on file for patient notified was aissatou mcmullen     Wound Care will continue to follow the patient? : no    Continue to monitor skin integrity as per policy,  and call or re-consult Wound Care with any acute changes or lack of progression of current recommendations. Wound care rceommendations are generally for shift RN dressing changes, unless otherwise specified. Wound Care IS NOT continuing to follow the patient unless otherwise specified as noted above.     Fabrice LIN, RN, CWCN  Wound Ostomy Nurse Educator   [facility]  Please call/message over Teams and/or Email for clarification/contact.  (E): [email or web address]

## 2025-02-18 NOTE — ADVANCED PRACTICE NURSE CONSULT - REASON FOR CONSULT
Wound Care was consulted for evaluation of the following:  -LLe venous ulcer     HPI: per chart review  -70 y/o female with PMH of HTN, HLD, ESRD on HD (M/W/F), DM-2 came to the ED complaining of low back pain. Pain has been going on for few days; described as aching, radiating to both thighs anteriorly. Patient reported chronic back pain but in the past few days it has worsened; bending forward/sitting on the side exacerbate the pain.     Pt seen at bedside today:  -reporting that she has been dealing with intermittent leg edema for the past several months into years, typically follows up with her outside MD for recommendations. currently c/o mild swelling b/l, and otherwise no f/c or acute complaint at this time

## 2025-02-18 NOTE — PHYSICAL THERAPY INITIAL EVALUATION ADULT - ADDITIONAL COMMENTS
Pt lives in a house with 6 steps to enter with  rails and  6 stairs inside with  rails.  Pt owns medical equipment: SAC, RW   Pt lives with: Spouse  Someone is always available to provide assist.

## 2025-02-18 NOTE — ADVANCED PRACTICE NURSE CONSULT - ASSESSMENT
JUDIE SCORE  Last judie score is : ---17  19+: pt is NOT at risk for developing pressure injuries  15-18: pt is AT RISK for developing pressure injuries   13-14: pt is AT MODERATE RISK for developing pressure injuries   10-12: pt is AT HIGH RISK for developing pressure injuries   6-9: pt is AT VERY HIGH RISK for developing pressure injuries     PHYSICAL EXAM  Is pt AOx?:x4 in nad resting comfortably in bed currently undergiong hemodialysis. limited exam as a result.   Bed bound?: at this time yes   Incontinent?: at times   Mattress/ bed active?: low airloss bed    Can pt assist with turn or dependent?: yes  Active pressure injury prevention measures? 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 measures ~ 1.5x1cm   -over te L greater toe, metatarsal head is partial thickness skin loss healing lesion measuring 0.5x0.5cm, likely e/o neuropathic ulcer, older. no active drainage or weeping, no increased temp gradient, no concern for active arterial involvement

## 2025-02-18 NOTE — PROGRESS NOTE ADULT - ASSESSMENT
70 yo female with history of HTN, HLD, ESRD on HD (M/W/F), DM-2 came to ED complaints of lower back pain and lower extremity weakness. Her CT of L-spine showed destructive process centered at the L4-5 disc space with endplate erosions, sclerosis, and grade 1 anterolisthesis, she was seen by Orthopedics, who recommended to do MRI of the spine with iv contrast to rule out discitis/osteomyelitis of the L-spine.   Nephrology was consulted for HD needs.     - ESRD on HD at [facility] unit MWF  HD access RUE AVG  Fluid status euvolemic  - HTN BP is uncontrolled, could be due to pain   - Anemia of CKD - Hb is below goal   - CKD MDB Hyperphosphatemia   - Secondary hyperparathyroidism   - Lower back pain, with destructive lesions in her spine, rule out spinal metastasis    Plan   HD today   Patient will need 3 consecutive session after MRI with iv contrast (day 2 today)  HD today 4 hrs  ml/min 3 K bath Goal UF 1-2L as tolerated by BP  Dose medications for HD   Aranesp 40 mg weekly with HD     Seen on HD.  Qd, Qb, UF rate reviewed.  Vitals stable.  Access working well.  Patient tolerating HD well.    Rest of management as per primary team   Discussed with primary team  Will follow

## 2025-02-18 NOTE — CONSULT NOTE ADULT - SUBJECTIVE AND OBJECTIVE BOX
Patient is a 69y old  Female who presents with a chief complaint of     HPI:  68 y/o female with PMH of HTN, HLD, ESRD on HD (M/W/F), DM-2 came to the ED complaining of low back pain. Pain has been going on for few days; described as aching, radiating to both thighs anteriorly. Patient reported chronic back pain but in the past few days it has worsened; bending forward/sitting on the side exacerbate the pain.        Analgesic Dosing for past 24 hours reviewed as below:  acetaminophen     Tablet ..   975 milliGRAM(s) Oral (02-18-25 @ 15:27)    HYDROmorphone  Injectable   0.5 milliGRAM(s) IV Push (02-18-25 @ 08:25)   0.5 milliGRAM(s) IV Push (02-17-25 @ 20:15)    HYDROmorphone  Injectable   0.5 milliGRAM(s) IV Push (02-18-25 @ 11:49)          T(C): 36.9 (02-18-25 @ 14:00), Max: 37 (02-17-25 @ 19:24)  HR: 73 (02-18-25 @ 14:00) (72 - 84)  BP: 159/73 (02-18-25 @ 14:00) (128/63 - 177/51)  RR: 18 (02-18-25 @ 14:00) (18 - 18)  SpO2: 94% (02-18-25 @ 14:00) (92% - 100%)      02-17-25 @ 07:01  -  02-18-25 @ 07:00  --------------------------------------------------------  IN: 50 mL / OUT: 1500 mL / NET: -1450 mL    02-18-25 @ 07:01  -  02-18-25 @ 16:18  --------------------------------------------------------  IN: 0 mL / OUT: 1000 mL / NET: -1000 mL        acetaminophen     Tablet .. 975 milliGRAM(s) Oral every 8 hours  acetaminophen     Tablet .. 650 milliGRAM(s) Oral every 6 hours PRN  albuterol    90 MICROgram(s) HFA Inhaler 2 Puff(s) Inhalation every 6 hours PRN  aluminum hydroxide/magnesium hydroxide/simethicone Suspension 30 milliLiter(s) Oral every 4 hours PRN  aspirin enteric coated 81 milliGRAM(s) Oral daily  atorvastatin 20 milliGRAM(s) Oral at bedtime  chlorhexidine 2% Cloths 1 Application(s) Topical daily  clopidogrel Tablet 75 milliGRAM(s) Oral daily  darbepoetin Injectable ViaL 40 MICROGram(s) IV Push every 7 days  dextrose 5%. 1000 milliLiter(s) IV Continuous <Continuous>  dextrose 5%. 1000 milliLiter(s) IV Continuous <Continuous>  dextrose 50% Injectable 25 Gram(s) IV Push once  dextrose 50% Injectable 12.5 Gram(s) IV Push once  dextrose 50% Injectable 25 Gram(s) IV Push once  dextrose Oral Gel 15 Gram(s) Oral once PRN  folic acid 1 milliGRAM(s) Oral daily  glucagon  Injectable 1 milliGRAM(s) IntraMuscular once  heparin   Injectable 5000 Unit(s) SubCutaneous every 8 hours  hydrALAZINE 50 milliGRAM(s) Oral three times a day  HYDROmorphone  Injectable 0.2 milliGRAM(s) IV Push every 6 hours PRN  HYDROmorphone  Injectable 0.5 milliGRAM(s) IV Push every 4 hours PRN  HYDROmorphone  Injectable 0.5 milliGRAM(s) IV Push every 4 hours PRN  insulin lispro (ADMELOG) corrective regimen sliding scale   SubCutaneous three times a day before meals  insulin lispro (ADMELOG) corrective regimen sliding scale   SubCutaneous at bedtime  labetalol 200 milliGRAM(s) Oral two times a day  melatonin 3 milliGRAM(s) Oral at bedtime PRN  montelukast 10 milliGRAM(s) Oral daily  naloxone Injectable 1 milliGRAM(s) IV Push once PRN  ondansetron Injectable 4 milliGRAM(s) IV Push every 8 hours PRN  senna 2 Tablet(s) Oral at bedtime                          9.8    5.08  )-----------( 312      ( 18 Feb 2025 05:11 )             31.6     02-18    135  |  95[L]  |  29.1[H]  ----------------------------<  94  4.7   |  26.0  |  5.56[H]    Ca    9.0      18 Feb 2025 05:11  Mg     2.3     02-18        Urinalysis Basic - ( 18 Feb 2025 05:11 )    Color: x / Appearance: x / SG: x / pH: x  Gluc: 94 mg/dL / Ketone: x  / Bili: x / Urobili: x   Blood: x / Protein: x / Nitrite: x   Leuk Esterase: x / RBC: x / WBC x   Sq Epi: x / Non Sq Epi: x / Bacteria: x        Pain Service   [phone]

## 2025-02-19 LAB
ANION GAP SERPL CALC-SCNC: 13 MMOL/L — SIGNIFICANT CHANGE UP (ref 5–17)
BASOPHILS # BLD AUTO: 0.02 K/UL — SIGNIFICANT CHANGE UP (ref 0–0.2)
BASOPHILS NFR BLD AUTO: 0.5 % — SIGNIFICANT CHANGE UP (ref 0–2)
BUN SERPL-MCNC: 18.7 MG/DL — SIGNIFICANT CHANGE UP (ref 8–20)
CALCIUM SERPL-MCNC: 9 MG/DL — SIGNIFICANT CHANGE UP (ref 8.4–10.5)
CHLORIDE SERPL-SCNC: 101 MMOL/L — SIGNIFICANT CHANGE UP (ref 96–108)
CO2 SERPL-SCNC: 28 MMOL/L — SIGNIFICANT CHANGE UP (ref 22–29)
CREAT SERPL-MCNC: 3.77 MG/DL — HIGH (ref 0.5–1.3)
EGFR: 13 ML/MIN/1.73M2 — LOW
EGFR: 13 ML/MIN/1.73M2 — LOW
EOSINOPHIL # BLD AUTO: 0.12 K/UL — SIGNIFICANT CHANGE UP (ref 0–0.5)
EOSINOPHIL NFR BLD AUTO: 2.9 % — SIGNIFICANT CHANGE UP (ref 0–6)
GLUCOSE BLDC GLUCOMTR-MCNC: 111 MG/DL — HIGH (ref 70–99)
GLUCOSE BLDC GLUCOMTR-MCNC: 112 MG/DL — HIGH (ref 70–99)
GLUCOSE BLDC GLUCOMTR-MCNC: 118 MG/DL — HIGH (ref 70–99)
GLUCOSE BLDC GLUCOMTR-MCNC: 70 MG/DL — SIGNIFICANT CHANGE UP (ref 70–99)
GLUCOSE SERPL-MCNC: 116 MG/DL — HIGH (ref 70–99)
HCT VFR BLD CALC: 32.7 % — LOW (ref 34.5–45)
HGB BLD-MCNC: 9.8 G/DL — LOW (ref 11.5–15.5)
IMM GRANULOCYTES # BLD AUTO: 0.01 K/UL — SIGNIFICANT CHANGE UP (ref 0–0.07)
IMM GRANULOCYTES NFR BLD AUTO: 0.2 % — SIGNIFICANT CHANGE UP (ref 0–0.9)
LYMPHOCYTES # BLD AUTO: 1.12 K/UL — SIGNIFICANT CHANGE UP (ref 1–3.3)
LYMPHOCYTES NFR BLD AUTO: 27.3 % — SIGNIFICANT CHANGE UP (ref 13–44)
MCHC RBC-ENTMCNC: 27.7 PG — SIGNIFICANT CHANGE UP (ref 27–34)
MCHC RBC-ENTMCNC: 30 G/DL — LOW (ref 32–36)
MCV RBC AUTO: 92.4 FL — SIGNIFICANT CHANGE UP (ref 80–100)
MONOCYTES # BLD AUTO: 0.57 K/UL — SIGNIFICANT CHANGE UP (ref 0–0.9)
MONOCYTES NFR BLD AUTO: 13.9 % — SIGNIFICANT CHANGE UP (ref 2–14)
NEUTROPHILS # BLD AUTO: 2.27 K/UL — SIGNIFICANT CHANGE UP (ref 1.8–7.4)
NEUTROPHILS NFR BLD AUTO: 55.2 % — SIGNIFICANT CHANGE UP (ref 43–77)
NRBC # BLD AUTO: 0 K/UL — SIGNIFICANT CHANGE UP (ref 0–0)
NRBC # FLD: 0 K/UL — SIGNIFICANT CHANGE UP (ref 0–0)
NRBC BLD AUTO-RTO: 0 /100 WBCS — SIGNIFICANT CHANGE UP (ref 0–0)
PLATELET # BLD AUTO: 327 K/UL — SIGNIFICANT CHANGE UP (ref 150–400)
PMV BLD: 10.9 FL — SIGNIFICANT CHANGE UP (ref 7–13)
POTASSIUM SERPL-MCNC: 4.4 MMOL/L — SIGNIFICANT CHANGE UP (ref 3.5–5.3)
POTASSIUM SERPL-SCNC: 4.4 MMOL/L — SIGNIFICANT CHANGE UP (ref 3.5–5.3)
RBC # BLD: 3.54 M/UL — LOW (ref 3.8–5.2)
RBC # FLD: 15.2 % — HIGH (ref 10.3–14.5)
SODIUM SERPL-SCNC: 142 MMOL/L — SIGNIFICANT CHANGE UP (ref 135–145)
WBC # BLD: 4.11 K/UL — SIGNIFICANT CHANGE UP (ref 3.8–10.5)
WBC # FLD AUTO: 4.11 K/UL — SIGNIFICANT CHANGE UP (ref 3.8–10.5)

## 2025-02-19 PROCEDURE — 90935 HEMODIALYSIS ONE EVALUATION: CPT

## 2025-02-19 PROCEDURE — 99233 SBSQ HOSP IP/OBS HIGH 50: CPT

## 2025-02-19 RX ORDER — HYDROMORPHONE/SOD CHLOR,ISO/PF 2 MG/10 ML
4 SYRINGE (ML) INJECTION EVERY 6 HOURS
Refills: 0 | Status: DISCONTINUED | OUTPATIENT
Start: 2025-02-19 | End: 2025-02-26

## 2025-02-19 RX ORDER — EPOETIN ALFA 10000 [IU]/ML
10000 SOLUTION INTRAVENOUS; SUBCUTANEOUS
Refills: 0 | Status: DISCONTINUED | OUTPATIENT
Start: 2025-02-19 | End: 2025-03-04

## 2025-02-19 RX ORDER — HYDROMORPHONE/SOD CHLOR,ISO/PF 2 MG/10 ML
2 SYRINGE (ML) INJECTION EVERY 6 HOURS
Refills: 0 | Status: DISCONTINUED | OUTPATIENT
Start: 2025-02-19 | End: 2025-02-25

## 2025-02-19 RX ORDER — EPOETIN ALFA 10000 [IU]/ML
10000 SOLUTION INTRAVENOUS; SUBCUTANEOUS
Refills: 0 | Status: DISCONTINUED | OUTPATIENT
Start: 2025-02-19 | End: 2025-02-19

## 2025-02-19 RX ADMIN — Medication 50 MILLIGRAM(S): at 05:19

## 2025-02-19 RX ADMIN — ATORVASTATIN CALCIUM 20 MILLIGRAM(S): 80 TABLET, FILM COATED ORAL at 21:40

## 2025-02-19 RX ADMIN — Medication 0.5 MILLIGRAM(S): at 05:29

## 2025-02-19 RX ADMIN — Medication 50 MILLIGRAM(S): at 21:40

## 2025-02-19 RX ADMIN — Medication 0.5 MILLIGRAM(S): at 14:30

## 2025-02-19 RX ADMIN — Medication 4 MILLIGRAM(S): at 13:35

## 2025-02-19 RX ADMIN — Medication 0.5 MILLIGRAM(S): at 07:58

## 2025-02-19 RX ADMIN — Medication 4 MILLIGRAM(S): at 19:36

## 2025-02-19 RX ADMIN — HEPARIN SODIUM 5000 UNIT(S): 1000 INJECTION INTRAVENOUS; SUBCUTANEOUS at 16:16

## 2025-02-19 RX ADMIN — Medication 1 APPLICATION(S): at 16:19

## 2025-02-19 RX ADMIN — LABETALOL HYDROCHLORIDE 200 MILLIGRAM(S): 200 TABLET, FILM COATED ORAL at 18:36

## 2025-02-19 RX ADMIN — Medication 0.5 MILLIGRAM(S): at 15:30

## 2025-02-19 RX ADMIN — Medication 975 MILLIGRAM(S): at 05:18

## 2025-02-19 RX ADMIN — Medication 4 MILLIGRAM(S): at 12:36

## 2025-02-19 RX ADMIN — Medication 0.5 MILLIGRAM(S): at 04:29

## 2025-02-19 RX ADMIN — CLOPIDOGREL BISULFATE 75 MILLIGRAM(S): 75 TABLET, FILM COATED ORAL at 16:15

## 2025-02-19 RX ADMIN — Medication 2 TABLET(S): at 21:42

## 2025-02-19 RX ADMIN — Medication 4 MILLIGRAM(S): at 18:36

## 2025-02-19 RX ADMIN — Medication 975 MILLIGRAM(S): at 06:18

## 2025-02-19 RX ADMIN — EPOETIN ALFA 10000 UNIT(S): 10000 SOLUTION INTRAVENOUS; SUBCUTANEOUS at 14:59

## 2025-02-19 RX ADMIN — MONTELUKAST SODIUM 10 MILLIGRAM(S): 10 TABLET ORAL at 16:16

## 2025-02-19 RX ADMIN — Medication 81 MILLIGRAM(S): at 16:15

## 2025-02-19 RX ADMIN — LABETALOL HYDROCHLORIDE 200 MILLIGRAM(S): 200 TABLET, FILM COATED ORAL at 05:19

## 2025-02-19 RX ADMIN — HEPARIN SODIUM 5000 UNIT(S): 1000 INJECTION INTRAVENOUS; SUBCUTANEOUS at 07:57

## 2025-02-19 RX ADMIN — Medication 0.5 MILLIGRAM(S): at 08:58

## 2025-02-19 NOTE — PROGRESS NOTE ADULT - SUBJECTIVE AND OBJECTIVE BOX
S/ Patient seen at bedside this morning and during rounds. . Back pain has improved today. However, she reports pain 9/10 in her right knee and leg.   Patient denies sedation with medications        Analgesic Dosing for past 24 hours reviewed as below:    acetaminophen     Tablet ..   975 milliGRAM(s) Oral (02-19-25 @ 05:18)   975 milliGRAM(s) Oral (02-18-25 @ 21:17)   975 milliGRAM(s) Oral (02-18-25 @ 15:27)    HYDROmorphone  Injectable   0.5 milliGRAM(s) IV Push (02-19-25 @ 07:58)    HYDROmorphone  Injectable   0.5 milliGRAM(s) IV Push (02-19-25 @ 04:29)   0.5 milliGRAM(s) IV Push (02-18-25 @ 19:42)          T(C): 36.4 (02-19-25 @ 12:17), Max: 36.9 (02-18-25 @ 14:00)  HR: 72 (02-19-25 @ 12:17) (69 - 79)  BP: 185/49 (02-19-25 @ 12:17) (115/54 - 185/49)  RR: 18 (02-19-25 @ 12:17) (17 - 18)  SpO2: 100% (02-19-25 @ 12:17) (91% - 100%)      02-18-25 @ 07:01  -  02-19-25 @ 07:00  --------------------------------------------------------  IN: 100 mL / OUT: 1000 mL / NET: -900 mL        acetaminophen     Tablet .. 650 milliGRAM(s) Oral every 6 hours PRN  acetaminophen     Tablet .. 975 milliGRAM(s) Oral every 8 hours  albuterol    90 MICROgram(s) HFA Inhaler 2 Puff(s) Inhalation every 6 hours PRN  aluminum hydroxide/magnesium hydroxide/simethicone Suspension 30 milliLiter(s) Oral every 4 hours PRN  aspirin enteric coated 81 milliGRAM(s) Oral daily  atorvastatin 20 milliGRAM(s) Oral at bedtime  chlorhexidine 2% Cloths 1 Application(s) Topical daily  clopidogrel Tablet 75 milliGRAM(s) Oral daily  darbepoetin Injectable ViaL 40 MICROGram(s) IV Push every 7 days  dextrose 5%. 1000 milliLiter(s) IV Continuous <Continuous>  dextrose 5%. 1000 milliLiter(s) IV Continuous <Continuous>  dextrose 50% Injectable 25 Gram(s) IV Push once  dextrose 50% Injectable 12.5 Gram(s) IV Push once  dextrose 50% Injectable 25 Gram(s) IV Push once  dextrose Oral Gel 15 Gram(s) Oral once PRN  epoetin dereck (EPOGEN) Injectable 57288 Unit(s) IV Push <User Schedule>  folic acid 1 milliGRAM(s) Oral daily  glucagon  Injectable 1 milliGRAM(s) IntraMuscular once  heparin   Injectable 5000 Unit(s) SubCutaneous every 8 hours  hydrALAZINE 50 milliGRAM(s) Oral three times a day  HYDROmorphone   Tablet 2 milliGRAM(s) Oral every 6 hours PRN  HYDROmorphone   Tablet 4 milliGRAM(s) Oral every 6 hours PRN  HYDROmorphone  Injectable 0.5 milliGRAM(s) IV Push every 4 hours PRN  insulin lispro (ADMELOG) corrective regimen sliding scale   SubCutaneous three times a day before meals  insulin lispro (ADMELOG) corrective regimen sliding scale   SubCutaneous at bedtime  labetalol 200 milliGRAM(s) Oral two times a day  melatonin 3 milliGRAM(s) Oral at bedtime PRN  montelukast 10 milliGRAM(s) Oral daily  naloxone Injectable 1 milliGRAM(s) IV Push once PRN  ondansetron Injectable 4 milliGRAM(s) IV Push every 8 hours PRN  senna 2 Tablet(s) Oral at bedtime                          9.8    4.11  )-----------( 327      ( 19 Feb 2025 06:08 )             32.7     02-19    142  |  101  |  18.7  ----------------------------<  116[H]  4.4   |  28.0  |  3.77[H]    Ca    9.0      19 Feb 2025 06:08  Mg     2.3     02-18        Urinalysis Basic - ( 19 Feb 2025 06:08 )    Color: x / Appearance: x / SG: x / pH: x  Gluc: 116 mg/dL / Ketone: x  / Bili: x / Urobili: x   Blood: x / Protein: x / Nitrite: x   Leuk Esterase: x / RBC: x / WBC x   Sq Epi: x / Non Sq Epi: x / Bacteria: x        Pain Service   [phone]

## 2025-02-19 NOTE — PROGRESS NOTE ADULT - ASSESSMENT
68 yo female with history of HTN, HLD, ESRD on HD (M/W/F), DM-2 came to ED complaints of lower back pain and lower extremity weakness. Her CT of L-spine showed destructive process centered at the L4-5 disc space with endplate erosions, sclerosis, and grade 1 anterolisthesis, she was seen by Orthopedics, who recommended to do MRI of the spine with iv contrast to rule out discitis/osteomyelitis of the L-spine.   Nephrology was consulted for HD needs.     - ESRD on HD at [facility] unit MWF  HD access RUE AVG  Fluid status euvolemic  - HTN BP is uncontrolled, could be due to pain   - Anemia of CKD - Hb is below goal   - CKD MDB Hyperphosphatemia   - Secondary hyperparathyroidism   - Lower back pain, with destructive lesions in her spine, rule out spinal metastasis    Plan   HD today   Patient will need 3 consecutive session after MRI with iv contrast (day 3 today)  HD today 3 hrs  ml/min 3 K bath Goal UF 1-2L as tolerated by BP  Dose medications for HD   Aranesp 40 mg weekly with HD     Seen on HD.  Qd, Qb, UF rate reviewed.  Vitals stable.  Access working well.  Patient tolerating HD well. (c/o pain)    Rest of management as per primary team   Discussed with primary team  Will follow

## 2025-02-19 NOTE — PROGRESS NOTE ADULT - SUBJECTIVE AND OBJECTIVE BOX
Hospitalist Daily Progress Note    Chief Complaint:  Patient is a 69y old  Female who presents with a chief complaint of     SUBJECTIVE / OVERNIGHT EVENTS:  Patient was seen and examined at bedside. Back pain has improved today.   Patient denies chest pain, SOB, abd pain, N/V, fever, chills, dysuria or any other complaints. All remainder ROS negative.     MEDICATIONS  (STANDING):  acetaminophen     Tablet .. 975 milliGRAM(s) Oral every 8 hours  aspirin enteric coated 81 milliGRAM(s) Oral daily  atorvastatin 20 milliGRAM(s) Oral at bedtime  chlorhexidine 2% Cloths 1 Application(s) Topical daily  clopidogrel Tablet 75 milliGRAM(s) Oral daily  darbepoetin Injectable ViaL 40 MICROGram(s) IV Push every 7 days  dextrose 5%. 1000 milliLiter(s) (100 mL/Hr) IV Continuous <Continuous>  dextrose 5%. 1000 milliLiter(s) (50 mL/Hr) IV Continuous <Continuous>  dextrose 50% Injectable 25 Gram(s) IV Push once  dextrose 50% Injectable 12.5 Gram(s) IV Push once  dextrose 50% Injectable 25 Gram(s) IV Push once  epoetin dereck (EPOGEN) Injectable 47121 Unit(s) IV Push <User Schedule>  folic acid 1 milliGRAM(s) Oral daily  glucagon  Injectable 1 milliGRAM(s) IntraMuscular once  heparin   Injectable 5000 Unit(s) SubCutaneous every 8 hours  hydrALAZINE 50 milliGRAM(s) Oral three times a day  insulin lispro (ADMELOG) corrective regimen sliding scale   SubCutaneous three times a day before meals  insulin lispro (ADMELOG) corrective regimen sliding scale   SubCutaneous at bedtime  labetalol 200 milliGRAM(s) Oral two times a day  montelukast 10 milliGRAM(s) Oral daily  senna 2 Tablet(s) Oral at bedtime    MEDICATIONS  (PRN):  acetaminophen     Tablet .. 650 milliGRAM(s) Oral every 6 hours PRN Temp greater or equal to 38C (100.4F), Mild Pain (1 - 3)  albuterol    90 MICROgram(s) HFA Inhaler 2 Puff(s) Inhalation every 6 hours PRN for shortness of breath and/or wheezing  aluminum hydroxide/magnesium hydroxide/simethicone Suspension 30 milliLiter(s) Oral every 4 hours PRN Dyspepsia  dextrose Oral Gel 15 Gram(s) Oral once PRN Blood Glucose LESS THAN 70 milliGRAM(s)/deciliter  HYDROmorphone   Tablet 2 milliGRAM(s) Oral every 6 hours PRN Moderate Pain (4 - 6)  HYDROmorphone   Tablet 4 milliGRAM(s) Oral every 6 hours PRN Severe Pain (7 - 10)  HYDROmorphone  Injectable 0.5 milliGRAM(s) IV Push every 4 hours PRN Breakthrough pain  melatonin 3 milliGRAM(s) Oral at bedtime PRN Insomnia  naloxone Injectable 1 milliGRAM(s) IV Push once PRN For Opioid OD  ondansetron Injectable 4 milliGRAM(s) IV Push every 8 hours PRN Nausea and/or Vomiting        I&O's Summary    18 Feb 2025 07:01  -  19 Feb 2025 07:00  --------------------------------------------------------  IN: 100 mL / OUT: 1000 mL / NET: -900 mL        PHYSICAL EXAM:  Vital Signs Last 24 Hrs  T(C): 36.6 (19 Feb 2025 08:06), Max: 36.9 (18 Feb 2025 14:00)  T(F): 97.9 (19 Feb 2025 08:06), Max: 98.5 (18 Feb 2025 14:00)  HR: 69 (19 Feb 2025 08:06) (69 - 79)  BP: 132/74 (19 Feb 2025 08:06) (115/54 - 159/73)  BP(mean): --  RR: 18 (19 Feb 2025 08:06) (17 - 18)  SpO2: 91% (19 Feb 2025 08:06) (91% - 98%)    Parameters below as of 19 Feb 2025 08:06  Patient On (Oxygen Delivery Method): room air          Constitutional: NAD, Resting  ENT: Supple, No JVD  Lungs: CTA B/L, Non-labored breathing  Cardio: RRR, S1/S2, No murmur  Abdomen: Soft, Nontender, Nondistended; Bowel sounds present  Extremities: No calf tenderness, No pitting edema, LUE with avf  Musculoskeletal:   No joint swelling  Psych: Calm, cooperative affect appropriate  Neuro: Awake and alert, oriented x4  Skin: No rashes; no palpable lesions    LABS:                        9.8    4.11  )-----------( 327      ( 19 Feb 2025 06:08 )             32.7     02-19    142  |  101  |  18.7  ----------------------------<  116[H]  4.4   |  28.0  |  3.77[H]    Ca    9.0      19 Feb 2025 06:08  Mg     2.3     02-18            Urinalysis Basic - ( 19 Feb 2025 06:08 )    Color: x / Appearance: x / SG: x / pH: x  Gluc: 116 mg/dL / Ketone: x  / Bili: x / Urobili: x   Blood: x / Protein: x / Nitrite: x   Leuk Esterase: x / RBC: x / WBC x   Sq Epi: x / Non Sq Epi: x / Bacteria: x        CAPILLARY BLOOD GLUCOSE      POCT Blood Glucose.: 111 mg/dL (19 Feb 2025 11:25)  POCT Blood Glucose.: 118 mg/dL (19 Feb 2025 07:44)  POCT Blood Glucose.: 147 mg/dL (18 Feb 2025 21:15)  POCT Blood Glucose.: 143 mg/dL (18 Feb 2025 16:38)  POCT Blood Glucose.: 101 mg/dL (18 Feb 2025 11:36)        RADIOLOGY REVIEWED

## 2025-02-19 NOTE — PROGRESS NOTE ADULT - SUBJECTIVE AND OBJECTIVE BOX
EULOGIO NIELSEN  [id number]    Patient interviewed and examined sitting up in chair at bedside this morning.  Patient continues to endorse positional pain in bed / poor bed mobility. The patient's pain is otherwise controlled using the prescribed pain medications. The patient is participating in physical therapy. Denies n/v, SOB, CP, chills, fever, numbness, paresthesia, new or worsening motor/sensory deficit. No new orthopedic complaints.    Vital Signs Last 24 Hrs  T(C): 36.4 (19 Feb 2025 12:17), Max: 36.7 (18 Feb 2025 16:37)  T(F): 97.6 (19 Feb 2025 12:17), Max: 98.1 (19 Feb 2025 00:06)  HR: 72 (19 Feb 2025 12:17) (69 - 79)  BP: 185/49 (19 Feb 2025 12:17) (115/54 - 185/49)  BP(mean): --  RR: 18 (19 Feb 2025 12:17) (17 - 18)  SpO2: 100% (19 Feb 2025 12:17) (91% - 100%)    Parameters below as of 19 Feb 2025 12:17  Patient On (Oxygen Delivery Method): room air               9.8    4.11  )-----------( 327      ( 19 Feb 2025 06:08 )             32.7     02-19    142  |  101  |  18.7  ----------------------------<  116[H]  4.4   |  28.0  |  3.77[H]    Ca    9.0      19 Feb 2025 06:08  Mg     2.3     02-18    MEDICATIONS  (STANDING):  aspirin enteric coated 81 milliGRAM(s) Oral daily  atorvastatin 20 milliGRAM(s) Oral at bedtime  chlorhexidine 2% Cloths 1 Application(s) Topical daily  clopidogrel Tablet 75 milliGRAM(s) Oral daily  darbepoetin Injectable ViaL 40 MICROGram(s) IV Push every 7 days  dextrose 5%. 1000 milliLiter(s) (100 mL/Hr) IV Continuous <Continuous>  dextrose 5%. 1000 milliLiter(s) (50 mL/Hr) IV Continuous <Continuous>  dextrose 50% Injectable 25 Gram(s) IV Push once  dextrose 50% Injectable 12.5 Gram(s) IV Push once  dextrose 50% Injectable 25 Gram(s) IV Push once  folic acid 1 milliGRAM(s) Oral daily  glucagon  Injectable 1 milliGRAM(s) IntraMuscular once  heparin   Injectable 5000 Unit(s) SubCutaneous every 8 hours  hydrALAZINE 50 milliGRAM(s) Oral three times a day  insulin lispro (ADMELOG) corrective regimen sliding scale   SubCutaneous three times a day before meals  insulin lispro (ADMELOG) corrective regimen sliding scale   SubCutaneous at bedtime  labetalol 200 milliGRAM(s) Oral two times a day  montelukast 10 milliGRAM(s) Oral daily  senna 2 Tablet(s) Oral at bedtime    MEDICATIONS  (PRN):  acetaminophen     Tablet .. 650 milliGRAM(s) Oral every 6 hours PRN Temp greater or equal to 38C (100.4F), Mild Pain (1 - 3)  albuterol    90 MICROgram(s) HFA Inhaler 2 Puff(s) Inhalation every 6 hours PRN for shortness of breath and/or wheezing  aluminum hydroxide/magnesium hydroxide/simethicone Suspension 30 milliLiter(s) Oral every 4 hours PRN Dyspepsia  dextrose Oral Gel 15 Gram(s) Oral once PRN Blood Glucose LESS THAN 70 milliGRAM(s)/deciliter  HYDROmorphone  Injectable 0.2 milliGRAM(s) IV Push every 6 hours PRN Moderate Pain (4 - 6)  HYDROmorphone  Injectable 0.5 milliGRAM(s) IV Push every 6 hours PRN Severe Pain (7 - 10)  melatonin 3 milliGRAM(s) Oral at bedtime PRN Insomnia  naloxone Injectable 1 milliGRAM(s) IV Push once PRN For Opioid OD  ondansetron Injectable 4 milliGRAM(s) IV Push every 8 hours PRN Nausea and/or Vomiting    Appearance: Alert, responsive, in no acute distress.  Skin: no rash on visible skin. Skin is clean, dry and intact. No bleeding. No abrasions. No ulcerations.  Musculoskeletal:       Lumbar: dressing clean, dry, intact. +HV     Left Lower Extremity: Sensation is grossly intact to light touch. 2+ distal pulses. Cap refill < 2 sec.      Right Lower Extremity: Sensation is grossly intact to light touch. 2+ distal pulses. Cap refill < 2 sec.   Motor exam: [  ]             [ ] Lower extremity                    HF(l2)    KE(l3)   TA(l4)  EHL(l5)    GS(s1)                                                 R        5/5        5/5        4/5       4/5         5/5                                               L         5/5        5/5       5/5       5/5          5/5    Primary Orthopedic Assessment:  • 69y Female wit L 4-5 discitis    Plan:   • DVT prophylaxis as prescribed, including use of compression devices and ankle pumps  • Continue physical therapy  • OOB tolerated w/ walker  • Incentive spirometry encouraged  • Pain control as clinically indicated  • Abx as per ID  • MRI imaging done  • Dr. Wilkins to see pt  • F/u with Dr. Wilkins for definitive plan

## 2025-02-19 NOTE — PROGRESS NOTE ADULT - SUBJECTIVE AND OBJECTIVE BOX
Reason for visit: ESRD    Subjective: No acute overnight event. Patient denied any cardiac or urinary complains. No fever/chills.   Seen on HD   Received MRI w/ [facility] on 2/17  C/o extreme pain in left hip and leg    ROS: All systems were reviewed in detail pertinent positive and negative mentioned above, rest are negative.    Physical Exam:  Gen: no acute distress  MS: alert, conversing normally  Eyes: EOMI, no icterus  HENT: NCAT, MMM  CV: rhythm reg reg, rate normal, no m/g/r  Chest: CTAB, no w/r/r,  Abd: soft, NT, ND  Extremities: No edema    Vital Signs Last 24 Hrs  T(C): 36.4 (19 Feb 2025 14:45), Max: 36.7 (18 Feb 2025 16:37)  T(F): 97.6 (19 Feb 2025 14:45), Max: 98.1 (19 Feb 2025 00:06)  HR: 65 (19 Feb 2025 14:45) (65 - 79)  BP: 129/54 (19 Feb 2025 14:45) (115/54 - 185/49)  BP(mean): --  RR: 18 (19 Feb 2025 14:45) (17 - 18)  SpO2: 93% (19 Feb 2025 14:45) (91% - 100%)    Parameters below as of 19 Feb 2025 14:45  Patient On (Oxygen Delivery Method): room air      I&O's Summary    18 Feb 2025 07:01  -  19 Feb 2025 07:00  --------------------------------------------------------  IN: 100 mL / OUT: 1000 mL / NET: -900 mL    19 Feb 2025 07:01  -  19 Feb 2025 16:12  --------------------------------------------------------  IN: 0 mL / OUT: 500 mL / NET: -500 mL      Current Antibiotics:    Other medications:  acetaminophen     Tablet .. 975 milliGRAM(s) Oral every 8 hours  aspirin enteric coated 81 milliGRAM(s) Oral daily  atorvastatin 20 milliGRAM(s) Oral at bedtime  chlorhexidine 2% Cloths 1 Application(s) Topical daily  clopidogrel Tablet 75 milliGRAM(s) Oral daily  dextrose 5%. 1000 milliLiter(s) IV Continuous <Continuous>  dextrose 5%. 1000 milliLiter(s) IV Continuous <Continuous>  dextrose 50% Injectable 25 Gram(s) IV Push once  dextrose 50% Injectable 12.5 Gram(s) IV Push once  dextrose 50% Injectable 25 Gram(s) IV Push once  epoetin dereck-epbx (RETACRIT) Injectable 02006 Unit(s) IV Push <User Schedule>  folic acid 1 milliGRAM(s) Oral daily  glucagon  Injectable 1 milliGRAM(s) IntraMuscular once  heparin   Injectable 5000 Unit(s) SubCutaneous every 8 hours  hydrALAZINE 50 milliGRAM(s) Oral three times a day  insulin lispro (ADMELOG) corrective regimen sliding scale   SubCutaneous three times a day before meals  insulin lispro (ADMELOG) corrective regimen sliding scale   SubCutaneous at bedtime  labetalol 200 milliGRAM(s) Oral two times a day  montelukast 10 milliGRAM(s) Oral daily  senna 2 Tablet(s) Oral at bedtime    02-19    142  |  101  |  18.7  ----------------------------<  116[H]  4.4   |  28.0  |  3.77[H]    Ca    9.0      19 Feb 2025 06:08  Mg     2.3     02-18      Creatinine: 3.77 mg/dL (02-19-25 @ 06:08)  Creatinine: 5.56 mg/dL (02-18-25 @ 05:11)  Creatinine: 8.75 mg/dL (02-17-25 @ 05:20)  Creatinine: 7.34 mg/dL (02-16-25 @ 10:55)  Creatinine: 6.37 mg/dL (02-15-25 @ 21:30)                          9.8    4.11  )-----------( 327      ( 19 Feb 2025 06:08 )             32.7

## 2025-02-19 NOTE — PROGRESS NOTE ADULT - ASSESSMENT
68 y/o female with PMH of HTN, HLD, ESRD on HD (M/W/F), DM-2 came to the ED complaining of low back pain. Pain has been going on for few days; described as aching, radiating to both thighs anteriorly. Patient reported chronic back pain but in the past few days it has worsened; bending forward/sitting on the side exacerbate the pain.     #Acute on chronic low back pain   #Discitis/OM of L4-5  CT lumbar degenerative disc changes at L4-L5 concerning for discitis/OM   Ortho spine on board   MRI with OM/Discitis of L4/5  Will hold antibiotic for now   Blood cx neg x 1  Repeat blood cx are sent  ID consult appreciated  Pain management consult appreciated  Pain control   Bedrest per ortho  Ortho recs pending per MRI results - Discussed again - if no intervention by Ortho may need IR assessment     #Right knee pain   XR knee: lateral prosthetic patellar dislocation  Seen by ortho  Pain control   PT eval     #ESRD   On HD (M/W/F)   Nephrology consulted   Monitor BMP     #HTN/HLD/CAD   Plavix 75mg   Simvastatin 40mg   Labetalol 200mg bid   Hydralazine 50mg   Aspirin     #Hyperglycemia with DM-2   Patient not on medication   HbA1C: 5.5 (07/24/23)   Insulin sliding scale   A1C of 6.9  Lifestyle modification     #Supportive   DVT prophylaxis: Heparin sc  Diet: renal     Dispo: Will need PT eval after Ortho optimization

## 2025-02-19 NOTE — PROGRESS NOTE ADULT - ASSESSMENT
70 y/o female with PMH of HTN, HLD, ESRD on HD (M/W/F), DM-2 came to the ED complaining of low back pain. Pain has been going on for few days; described as aching, radiating to both thighs anteriorly. Patient reported chronic back pain but in the past few days it has worsened; bending forward/sitting on the side exacerbate the pain.      Plan:     -Continue acetaminophen Tablet  975 milliGRAM(s) Oral every 8 hours  -DC IV dilaudid and continue PO     -HYDROmorphone  PO 2mg q6hrs PRN for moderate pain  -HYDROmorphone PO 4mg q6hrs for severe pain

## 2025-02-20 LAB
ANION GAP SERPL CALC-SCNC: 12 MMOL/L — SIGNIFICANT CHANGE UP (ref 5–17)
ANION GAP SERPL CALC-SCNC: 14 MMOL/L — SIGNIFICANT CHANGE UP (ref 5–17)
BASOPHILS # BLD AUTO: 0.03 K/UL — SIGNIFICANT CHANGE UP (ref 0–0.2)
BASOPHILS # BLD MANUAL: 0.08 K/UL — SIGNIFICANT CHANGE UP (ref 0–0.2)
BASOPHILS NFR BLD AUTO: 0.7 % — SIGNIFICANT CHANGE UP (ref 0–2)
BASOPHILS NFR BLD MANUAL: 1.7 % — SIGNIFICANT CHANGE UP (ref 0–2)
BUN SERPL-MCNC: 20.1 MG/DL — HIGH (ref 8–20)
BUN SERPL-MCNC: 22.1 MG/DL — HIGH (ref 8–20)
CALCIUM SERPL-MCNC: 9 MG/DL — SIGNIFICANT CHANGE UP (ref 8.4–10.5)
CALCIUM SERPL-MCNC: 9.2 MG/DL — SIGNIFICANT CHANGE UP (ref 8.4–10.5)
CHLORIDE SERPL-SCNC: 96 MMOL/L — SIGNIFICANT CHANGE UP (ref 96–108)
CHLORIDE SERPL-SCNC: 97 MMOL/L — SIGNIFICANT CHANGE UP (ref 96–108)
CO2 SERPL-SCNC: 24 MMOL/L — SIGNIFICANT CHANGE UP (ref 22–29)
CO2 SERPL-SCNC: 28 MMOL/L — SIGNIFICANT CHANGE UP (ref 22–29)
CREAT SERPL-MCNC: 3.91 MG/DL — HIGH (ref 0.5–1.3)
CREAT SERPL-MCNC: 4.33 MG/DL — HIGH (ref 0.5–1.3)
EGFR: 11 ML/MIN/1.73M2 — LOW
EGFR: 11 ML/MIN/1.73M2 — LOW
EGFR: 12 ML/MIN/1.73M2 — LOW
EGFR: 12 ML/MIN/1.73M2 — LOW
ELLIPTOCYTES BLD QL SMEAR: SLIGHT — SIGNIFICANT CHANGE UP
EOSINOPHIL # BLD AUTO: 0.14 K/UL — SIGNIFICANT CHANGE UP (ref 0–0.5)
EOSINOPHIL # BLD MANUAL: 0.16 K/UL — SIGNIFICANT CHANGE UP (ref 0–0.5)
EOSINOPHIL NFR BLD AUTO: 3.1 % — SIGNIFICANT CHANGE UP (ref 0–6)
EOSINOPHIL NFR BLD MANUAL: 3.5 % — SIGNIFICANT CHANGE UP (ref 0–6)
ERYTHROCYTE [SEDIMENTATION RATE] IN BLOOD: 63 MM/HR — HIGH (ref 0–20)
GIANT PLATELETS BLD QL SMEAR: PRESENT
GLUCOSE BLDC GLUCOMTR-MCNC: 107 MG/DL — HIGH (ref 70–99)
GLUCOSE BLDC GLUCOMTR-MCNC: 135 MG/DL — HIGH (ref 70–99)
GLUCOSE BLDC GLUCOMTR-MCNC: 151 MG/DL — HIGH (ref 70–99)
GLUCOSE BLDC GLUCOMTR-MCNC: 92 MG/DL — SIGNIFICANT CHANGE UP (ref 70–99)
GLUCOSE SERPL-MCNC: 155 MG/DL — HIGH (ref 70–99)
GLUCOSE SERPL-MCNC: 85 MG/DL — SIGNIFICANT CHANGE UP (ref 70–99)
HBV SURFACE AB SER-ACNC: 66 MIU/ML — SIGNIFICANT CHANGE UP
HBV SURFACE AG SER-ACNC: SIGNIFICANT CHANGE UP
HCT VFR BLD CALC: 34.4 % — LOW (ref 34.5–45)
HCV AB S/CO SERPL IA: 0.39 S/CO — SIGNIFICANT CHANGE UP (ref 0–0.79)
HCV AB SERPL-IMP: SIGNIFICANT CHANGE UP
HGB BLD-MCNC: 10 G/DL — LOW (ref 11.5–15.5)
IMM GRANULOCYTES # BLD AUTO: 0.03 K/UL — SIGNIFICANT CHANGE UP (ref 0–0.07)
IMM GRANULOCYTES NFR BLD AUTO: 0.7 % — SIGNIFICANT CHANGE UP (ref 0–0.9)
LYMPHOCYTES # BLD AUTO: 1.33 K/UL — SIGNIFICANT CHANGE UP (ref 1–3.3)
LYMPHOCYTES # BLD MANUAL: 1.26 K/UL — SIGNIFICANT CHANGE UP (ref 1–3.3)
LYMPHOCYTES NFR BLD AUTO: 29.3 % — SIGNIFICANT CHANGE UP (ref 13–44)
LYMPHOCYTES NFR BLD MANUAL: 27.8 % — SIGNIFICANT CHANGE UP (ref 13–44)
MANUAL METAMYELOCYTE #: 0.04 K/UL — HIGH (ref 0–0)
MCHC RBC-ENTMCNC: 27.2 PG — SIGNIFICANT CHANGE UP (ref 27–34)
MCHC RBC-ENTMCNC: 29.1 G/DL — LOW (ref 32–36)
MCV RBC AUTO: 93.7 FL — SIGNIFICANT CHANGE UP (ref 80–100)
METAMYELOCYTES # FLD: 0.9 % — HIGH (ref 0–0)
METAMYELOCYTES NFR BLD: 0.9 % — HIGH (ref 0–0)
MONOCYTES # BLD AUTO: 0.59 K/UL — SIGNIFICANT CHANGE UP (ref 0–0.9)
MONOCYTES # BLD MANUAL: 0.39 K/UL — SIGNIFICANT CHANGE UP (ref 0–0.9)
MONOCYTES NFR BLD AUTO: 13 % — SIGNIFICANT CHANGE UP (ref 2–14)
MONOCYTES NFR BLD MANUAL: 8.7 % — SIGNIFICANT CHANGE UP (ref 2–14)
NEUTROPHILS # BLD AUTO: 2.42 K/UL — SIGNIFICANT CHANGE UP (ref 1.8–7.4)
NEUTROPHILS # BLD MANUAL: 2.61 K/UL — SIGNIFICANT CHANGE UP (ref 1.8–7.4)
NEUTROPHILS NFR BLD AUTO: 53.2 % — SIGNIFICANT CHANGE UP (ref 43–77)
NEUTROPHILS NFR BLD MANUAL: 57.4 % — SIGNIFICANT CHANGE UP (ref 43–77)
NRBC # BLD AUTO: 0 K/UL — SIGNIFICANT CHANGE UP (ref 0–0)
NRBC # FLD: 0 K/UL — SIGNIFICANT CHANGE UP (ref 0–0)
NRBC BLD AUTO-RTO: 0 /100 WBCS — SIGNIFICANT CHANGE UP (ref 0–0)
OVALOCYTES BLD QL SMEAR: SLIGHT — SIGNIFICANT CHANGE UP
PLAT MORPH BLD: NORMAL — SIGNIFICANT CHANGE UP
PLATELET # BLD AUTO: 237 K/UL — SIGNIFICANT CHANGE UP (ref 150–400)
PMV BLD: 11.2 FL — SIGNIFICANT CHANGE UP (ref 7–13)
POIKILOCYTOSIS BLD QL AUTO: SLIGHT — SIGNIFICANT CHANGE UP
POTASSIUM SERPL-MCNC: 4.3 MMOL/L — SIGNIFICANT CHANGE UP (ref 3.5–5.3)
POTASSIUM SERPL-MCNC: 5.4 MMOL/L — HIGH (ref 3.5–5.3)
POTASSIUM SERPL-SCNC: 4.3 MMOL/L — SIGNIFICANT CHANGE UP (ref 3.5–5.3)
POTASSIUM SERPL-SCNC: 5.4 MMOL/L — HIGH (ref 3.5–5.3)
RBC # BLD: 3.67 M/UL — LOW (ref 3.8–5.2)
RBC # FLD: 15.2 % — HIGH (ref 10.3–14.5)
RBC BLD AUTO: ABNORMAL
SODIUM SERPL-SCNC: 134 MMOL/L — LOW (ref 135–145)
SODIUM SERPL-SCNC: 136 MMOL/L — SIGNIFICANT CHANGE UP (ref 135–145)
WBC # BLD: 4.54 K/UL — SIGNIFICANT CHANGE UP (ref 3.8–10.5)
WBC # FLD AUTO: 4.54 K/UL — SIGNIFICANT CHANGE UP (ref 3.8–10.5)

## 2025-02-20 PROCEDURE — 99221 1ST HOSP IP/OBS SF/LOW 40: CPT

## 2025-02-20 PROCEDURE — 99232 SBSQ HOSP IP/OBS MODERATE 35: CPT

## 2025-02-20 RX ORDER — GABAPENTIN 400 MG/1
300 CAPSULE ORAL THREE TIMES A DAY
Refills: 0 | Status: DISCONTINUED | OUTPATIENT
Start: 2025-02-20 | End: 2025-03-02

## 2025-02-20 RX ADMIN — Medication 2 TABLET(S): at 21:45

## 2025-02-20 RX ADMIN — LABETALOL HYDROCHLORIDE 200 MILLIGRAM(S): 200 TABLET, FILM COATED ORAL at 05:40

## 2025-02-20 RX ADMIN — INSULIN LISPRO 1: 100 INJECTION, SOLUTION INTRAVENOUS; SUBCUTANEOUS at 16:27

## 2025-02-20 RX ADMIN — HEPARIN SODIUM 5000 UNIT(S): 1000 INJECTION INTRAVENOUS; SUBCUTANEOUS at 08:22

## 2025-02-20 RX ADMIN — Medication 975 MILLIGRAM(S): at 22:45

## 2025-02-20 RX ADMIN — HEPARIN SODIUM 5000 UNIT(S): 1000 INJECTION INTRAVENOUS; SUBCUTANEOUS at 16:26

## 2025-02-20 RX ADMIN — CLOPIDOGREL BISULFATE 75 MILLIGRAM(S): 75 TABLET, FILM COATED ORAL at 13:04

## 2025-02-20 RX ADMIN — Medication 975 MILLIGRAM(S): at 13:04

## 2025-02-20 RX ADMIN — HEPARIN SODIUM 5000 UNIT(S): 1000 INJECTION INTRAVENOUS; SUBCUTANEOUS at 23:58

## 2025-02-20 RX ADMIN — Medication 1 APPLICATION(S): at 13:05

## 2025-02-20 RX ADMIN — LABETALOL HYDROCHLORIDE 200 MILLIGRAM(S): 200 TABLET, FILM COATED ORAL at 16:26

## 2025-02-20 RX ADMIN — GABAPENTIN 300 MILLIGRAM(S): 400 CAPSULE ORAL at 21:45

## 2025-02-20 RX ADMIN — Medication 50 MILLIGRAM(S): at 05:40

## 2025-02-20 RX ADMIN — Medication 4 MILLIGRAM(S): at 09:22

## 2025-02-20 RX ADMIN — Medication 50 MILLIGRAM(S): at 22:08

## 2025-02-20 RX ADMIN — Medication 4 MILLIGRAM(S): at 08:22

## 2025-02-20 RX ADMIN — Medication 975 MILLIGRAM(S): at 14:00

## 2025-02-20 RX ADMIN — Medication 975 MILLIGRAM(S): at 21:45

## 2025-02-20 RX ADMIN — Medication 50 MILLIGRAM(S): at 13:04

## 2025-02-20 RX ADMIN — Medication 81 MILLIGRAM(S): at 13:04

## 2025-02-20 RX ADMIN — ATORVASTATIN CALCIUM 20 MILLIGRAM(S): 80 TABLET, FILM COATED ORAL at 21:45

## 2025-02-20 RX ADMIN — Medication 4 MILLIGRAM(S): at 17:26

## 2025-02-20 RX ADMIN — Medication 4 MILLIGRAM(S): at 16:26

## 2025-02-20 RX ADMIN — HEPARIN SODIUM 5000 UNIT(S): 1000 INJECTION INTRAVENOUS; SUBCUTANEOUS at 00:36

## 2025-02-20 RX ADMIN — GABAPENTIN 300 MILLIGRAM(S): 400 CAPSULE ORAL at 13:04

## 2025-02-20 NOTE — PROGRESS NOTE ADULT - NS ATTEND AMEND GEN_ALL_CORE FT
Orthopaedic Spine/Trauma Addendum:    I have personally reviewed the patients chart, imaging and lab results. I have reviewed the physician assistant note and agree with the history, exam, and plan of care, except as noted.    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 Blood cx are negative, shes been off abx, difficult to say cause for bone erosion  However, she has Grade 1 listhesis and severe foraminal compression fo B/L L4 nerve roots which is the cause of her leg pain.  She is minimal ambulator at baseline, symptoms are worse when she tries to ambulate  Options are, non op, pain control, gabapentin, broad spectrum abx to treat possible OM/Discitis  Vs surgical intervention, which would entail large Lumbar fusion, instrumentation likely from L2-Pelvis given bone quality and severe disc space loss and lack of anterior column support L4-L5.   This would alleviate her leg pain but carries with it likely risk of pseudoarthrosis, screw pull out, infection given medical comorbidities and extensive osteoporosis  She will think of her options,  I will see her tomorrow to discuss plan.     Darrick Wilkins DO  Orthopaedic Spine/Trauma Surgeon  [facility] [place]
Orthopaedic Spine/Trauma Addendum:    I have personally reviewed the patients chart, imaging and lab results. I have reviewed the physician assistant note and agree with the history, exam, and plan of care, except as noted.    Patient wishes to proceed with non operative management  Unfortunately, given instability, I would not recommend tissue sample as in order to get to the disc space, a hemilaminectomy would need to be performed and would further add to instability  IR may perform, however they have refused in past   Recommend repeat imaging , MRI within 4 weeks   Broad spectrum abx     Darrick Wilkins DO  Orthopaedic Spine/Trauma Surgeon  [facility] [place]

## 2025-02-20 NOTE — PROGRESS NOTE ADULT - SUBJECTIVE AND OBJECTIVE BOX
Hospitalist Daily Progress Note    Chief Complaint:  Patient is a 69y old  Female who presents with a chief complaint of Pain management (19 Feb 2025 12:26)      SUBJECTIVE / OVERNIGHT EVENTS:  Patient was seen and examined at bedside. Has back pain  Patient denies chest pain, SOB, abd pain, N/V, fever, chills, dysuria or any other complaints. All remainder ROS negative.     MEDICATIONS  (STANDING):  acetaminophen     Tablet .. 975 milliGRAM(s) Oral every 8 hours  aspirin enteric coated 81 milliGRAM(s) Oral daily  atorvastatin 20 milliGRAM(s) Oral at bedtime  chlorhexidine 2% Cloths 1 Application(s) Topical daily  clopidogrel Tablet 75 milliGRAM(s) Oral daily  dextrose 5%. 1000 milliLiter(s) (100 mL/Hr) IV Continuous <Continuous>  dextrose 5%. 1000 milliLiter(s) (50 mL/Hr) IV Continuous <Continuous>  dextrose 50% Injectable 25 Gram(s) IV Push once  dextrose 50% Injectable 12.5 Gram(s) IV Push once  dextrose 50% Injectable 25 Gram(s) IV Push once  epoetin dereck-epbx (RETACRIT) Injectable 75831 Unit(s) IV Push <User Schedule>  folic acid 1 milliGRAM(s) Oral daily  gabapentin 300 milliGRAM(s) Oral three times a day  glucagon  Injectable 1 milliGRAM(s) IntraMuscular once  heparin   Injectable 5000 Unit(s) SubCutaneous every 8 hours  hydrALAZINE 50 milliGRAM(s) Oral three times a day  insulin lispro (ADMELOG) corrective regimen sliding scale   SubCutaneous three times a day before meals  insulin lispro (ADMELOG) corrective regimen sliding scale   SubCutaneous at bedtime  labetalol 200 milliGRAM(s) Oral two times a day  montelukast 10 milliGRAM(s) Oral daily  senna 2 Tablet(s) Oral at bedtime    MEDICATIONS  (PRN):  acetaminophen     Tablet .. 650 milliGRAM(s) Oral every 6 hours PRN Temp greater or equal to 38C (100.4F), Mild Pain (1 - 3)  albuterol    90 MICROgram(s) HFA Inhaler 2 Puff(s) Inhalation every 6 hours PRN for shortness of breath and/or wheezing  aluminum hydroxide/magnesium hydroxide/simethicone Suspension 30 milliLiter(s) Oral every 4 hours PRN Dyspepsia  dextrose Oral Gel 15 Gram(s) Oral once PRN Blood Glucose LESS THAN 70 milliGRAM(s)/deciliter  HYDROmorphone   Tablet 2 milliGRAM(s) Oral every 6 hours PRN Moderate Pain (4 - 6)  HYDROmorphone   Tablet 4 milliGRAM(s) Oral every 6 hours PRN Severe Pain (7 - 10)  HYDROmorphone  Injectable 0.5 milliGRAM(s) IV Push every 4 hours PRN Breakthrough pain  melatonin 3 milliGRAM(s) Oral at bedtime PRN Insomnia  naloxone Injectable 1 milliGRAM(s) IV Push once PRN For Opioid OD  ondansetron Injectable 4 milliGRAM(s) IV Push every 8 hours PRN Nausea and/or Vomiting        I&O's Summary    19 Feb 2025 07:01  -  20 Feb 2025 07:00  --------------------------------------------------------  IN: 100 mL / OUT: 500 mL / NET: -400 mL        PHYSICAL EXAM:  Vital Signs Last 24 Hrs  T(C): 36.7 (20 Feb 2025 08:40), Max: 36.8 (20 Feb 2025 04:47)  T(F): 98 (20 Feb 2025 08:40), Max: 98.3 (20 Feb 2025 04:47)  HR: 71 (20 Feb 2025 08:40) (65 - 78)  BP: 107/55 (20 Feb 2025 08:40) (107/55 - 145/59)  BP(mean): --  RR: 18 (20 Feb 2025 08:40) (17 - 18)  SpO2: 95% (20 Feb 2025 08:40) (93% - 96%)    Parameters below as of 20 Feb 2025 08:40  Patient On (Oxygen Delivery Method): room air     Constitutional: NAD, Resting  ENT: Supple, No JVD  Lungs: CTA B/L, Non-labored breathing  Cardio: RRR, S1/S2, No murmur  Abdomen: Soft, Nontender, Nondistended; Bowel sounds present  Extremities: No calf tenderness, No pitting edema, LUE with avf  Musculoskeletal:   No joint swelling  Psych: Calm, cooperative affect appropriate  Neuro: Awake and alert, oriented x4  Skin: No rashes; no palpable lesions  LABS:                        10.0   4.54  )-----------( 237      ( 20 Feb 2025 06:16 )             34.4     02-20    136  |  97  |  22.1[H]  ----------------------------<  155[H]  4.3   |  28.0  |  4.33[H]    Ca    9.0      20 Feb 2025 09:25            Urinalysis Basic - ( 20 Feb 2025 09:25 )    Color: x / Appearance: x / SG: x / pH: x  Gluc: 155 mg/dL / Ketone: x  / Bili: x / Urobili: x   Blood: x / Protein: x / Nitrite: x   Leuk Esterase: x / RBC: x / WBC x   Sq Epi: x / Non Sq Epi: x / Bacteria: x        Culture - Blood (collected 19 Feb 2025 09:08)  Source: .Blood Blood  Preliminary Report (20 Feb 2025 13:02):    No growth at 24 hours    Culture - Blood (collected 19 Feb 2025 09:08)  Source: .Blood Blood  Preliminary Report (20 Feb 2025 13:01):    No growth at 24 hours      CAPILLARY BLOOD GLUCOSE      POCT Blood Glucose.: 107 mg/dL (20 Feb 2025 12:03)  POCT Blood Glucose.: 92 mg/dL (20 Feb 2025 07:43)  POCT Blood Glucose.: 112 mg/dL (19 Feb 2025 21:39)  POCT Blood Glucose.: 70 mg/dL (19 Feb 2025 16:14)        RADIOLOGY REVIEWED

## 2025-02-20 NOTE — CONSULT NOTE ADULT - SUBJECTIVE AND OBJECTIVE BOX
HPI:  70 y/o female with PMH of HTN, HLD, ESRD on HD (M/W/F), DM-2 came to the ED complaining of low back pain. Pain has been going on for few days; described as aching, radiating to both thighs anteriorly. Patient reported chronic back pain but in the past few days it has worsened; bending forward/sitting on the side exacerbate the pain. She has no trauma to the back, fall, fever, chills, nausea, vomiting, abdominal pain, change in bowel habit, chest pain, shortness of breath.  (2025 03:24)    MR noted for discitis at the level of L4-5    IR consulted for disc aspiration      ============================================================================  Medications:  Home Medications:  acetaminophen 325 mg oral tablet: 2 tab(s) orally every 6 hours As needed Temp greater or equal to 38C (100.4F), Mild Pain (1 - 3) (2023 14:02)  Albuterol (Eqv-ProAir HFA) 90 mcg/inh inhalation aerosol: 2 puff(s) inhaled every 6 hours as needed for  shortness of breath and/or wheezing (2023 05:40)  albuterol 2.5 mg/3 mL (0.083%) inhalation solution: 3 milliliter(s) by nebulizer every 6 hours as needed for  shortness of breath and/or wheezing (2023 05:40)  Aspir 81 oral delayed release tablet: 1 tab(s) orally once a day (03 Aug 2021 18:50)  folic acid 1 mg oral tablet: 1 tab(s) orally once a day (03 Aug 2021 18:50)  hydrALAZINE 50 mg oral tablet: 1 tab(s) orally 2 times a day (03 Aug 2021 18:50)  labetalol 200 mg oral tablet: 1 tab(s) orally 2 times a day (03 Aug 2021 18:50)  magnesium hydroxide 8% oral suspension: 30 milliliter(s) orally once a day As needed Constipation (2023 14:02)  Plavix 75 mg oral tablet: 1 tab(s) orally once a day (03 Aug 2021 18:50)  Singulair 10 mg oral tablet: 1 tab(s) orally once a day (03 Aug 2021 18:50)  Zocor 40 mg oral tablet: 1 tab(s) orally once a day (at bedtime) (03 Aug 2021 18:50)    MEDICATIONS  (STANDING):  acetaminophen     Tablet .. 975 milliGRAM(s) Oral every 8 hours  atorvastatin 20 milliGRAM(s) Oral at bedtime  chlorhexidine 2% Cloths 1 Application(s) Topical daily  dextrose 5%. 1000 milliLiter(s) (100 mL/Hr) IV Continuous <Continuous>  dextrose 5%. 1000 milliLiter(s) (50 mL/Hr) IV Continuous <Continuous>  dextrose 50% Injectable 25 Gram(s) IV Push once  dextrose 50% Injectable 12.5 Gram(s) IV Push once  dextrose 50% Injectable 25 Gram(s) IV Push once  epoetin dereck-epbx (RETACRIT) Injectable 59502 Unit(s) IV Push <User Schedule>  folic acid 1 milliGRAM(s) Oral daily  gabapentin 300 milliGRAM(s) Oral three times a day  glucagon  Injectable 1 milliGRAM(s) IntraMuscular once  heparin   Injectable 5000 Unit(s) SubCutaneous every 8 hours  hydrALAZINE 50 milliGRAM(s) Oral three times a day  insulin lispro (ADMELOG) corrective regimen sliding scale   SubCutaneous three times a day before meals  insulin lispro (ADMELOG) corrective regimen sliding scale   SubCutaneous at bedtime  labetalol 200 milliGRAM(s) Oral two times a day  montelukast 10 milliGRAM(s) Oral daily  senna 2 Tablet(s) Oral at bedtime    MEDICATIONS  (PRN):  acetaminophen     Tablet .. 650 milliGRAM(s) Oral every 6 hours PRN Temp greater or equal to 38C (100.4F), Mild Pain (1 - 3)  albuterol    90 MICROgram(s) HFA Inhaler 2 Puff(s) Inhalation every 6 hours PRN for shortness of breath and/or wheezing  aluminum hydroxide/magnesium hydroxide/simethicone Suspension 30 milliLiter(s) Oral every 4 hours PRN Dyspepsia  dextrose Oral Gel 15 Gram(s) Oral once PRN Blood Glucose LESS THAN 70 milliGRAM(s)/deciliter  HYDROmorphone   Tablet 2 milliGRAM(s) Oral every 6 hours PRN Moderate Pain (4 - 6)  HYDROmorphone   Tablet 4 milliGRAM(s) Oral every 6 hours PRN Severe Pain (7 - 10)  HYDROmorphone  Injectable 0.5 milliGRAM(s) IV Push every 4 hours PRN Breakthrough pain  melatonin 3 milliGRAM(s) Oral at bedtime PRN Insomnia  naloxone Injectable 1 milliGRAM(s) IV Push once PRN For Opioid OD  ondansetron Injectable 4 milliGRAM(s) IV Push every 8 hours PRN Nausea and/or Vomiting      Allergies:   lip swelling with lobster (Swelling)  erythromycin (Vomiting)  shellfish (Swelling; Hives)    ============================================================================  PAST MEDICAL & SURGICAL HISTORY:  CAD (coronary artery disease)      CKD (chronic kidney disease) requiring chronic dialysis      Type 2 diabetes mellitus      Diabetic retinopathy associated with diabetes mellitus due to underlying condition      Hypertension      HLD (hyperlipidemia)      CVA (cerebral vascular accident)        Asthma      S/P CABG (coronary artery bypass graft)      S/P coronary artery stent placement  (1)       S/P cataract extraction  OS with retained lens fragment 7/15/15      Acute hemodialysis patient  nothing in the left arm awaiting AV fistula placement - temporary cath in right upper chest quadrant      S/P  section      S/P cholecystectomy          FAMILY HISTORY:  FHx: breast cancer (Sibling)        Social History:  Remote hx of cigarette use, no alcohol or illicit drug use. , lives with family. (2025 03:24)      ============================================================================  Vitals:  Vital Signs Last 24 Hrs  T(C): 36.7 (2025 08:40), Max: 36.8 (2025 04:47)  T(F): 98 (2025 08:40), Max: 98.3 (2025 04:47)  HR: 71 (2025 08:40) (69 - 78)  BP: 107/55 (2025 08:40) (107/55 - 145/59)  BP(mean): --  RR: 18 (2025 08:40) (17 - 18)  SpO2: 95% (2025 08:40) (94% - 96%)    Parameters below as of 2025 08:40  Patient On (Oxygen Delivery Method): room air    Labs:                        10.0   4.54  )-----------( 237      ( 2025 06:16 )             34.4     02-20    136  |  97  |  22.1[H]  ----------------------------<  155[H]  4.3   |  28.0  |  4.33[H]    Ca    9.0      2025 09:25          Imaging:   Pertinent Imaging Reviewed.    ============================================================================

## 2025-02-20 NOTE — PROGRESS NOTE ADULT - SUBJECTIVE AND OBJECTIVE BOX
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 Patient seen and evaluated at bedside. Patient is doing well. Patient at this time wants to do nonop and antibiotics. Patient is working with PT. Denies any acute motor or sensory changes. Denies any fever/chills, SOB, CP, N/V, numbness/tingling. No other orthopedic complaints.     T(C): 36.7 (02-20-25 @ 08:40), Max: 36.8 (02-20-25 @ 04:47)  HR: 71 (02-20-25 @ 08:40) (65 - 78)  BP: 107/55 (02-20-25 @ 08:40) (107/55 - 185/49)  RR: 18 (02-20-25 @ 08:40) (17 - 18)  SpO2: 95% (02-20-25 @ 08:40) (93% - 100%)                          10.0   4.54  )-----------( 237      ( 20 Feb 2025 06:16 )             34.4       Appearance: Alert, responsive, in no acute distress.  Skin: no rash on visible skin. Skin is clean, dry and intact. No bleeding. No abrasions. No ulcerations.  Musculoskeletal:       Left Lower Extremity: Sensation is grossly intact to light touch. 2+ distal pulses. Cap refill < 2 sec.      Right Lower Extremity: Sensation is grossly intact to light touch. 2+ distal pulses. Cap refill < 2 sec.   Motor exam: [  ]             [ ] Lower extremity                    HF(l2)    KE(l3)   TA(l4)  EHL(l5)    GS(s1)                                                 R        5/5        5/5        4/5       4/5         5/5                                               L         5/5        5/5       5/5       5/5          5/5    02-19-25 @ 07:01  -  02-20-25 @ 07:00  --------------------------------------------------------  IN: 100 mL / OUT: 500 mL / NET: -400 mL        A/P: Patient is a 69y Female being followed for questionable OM/Discitis vs severe spondylosis in setting of brown tumor.    • DVT prophylaxis as prescribed, including use of compression devices and ankle pumps  • Continue physical therapy  • Out of Bed as tolerated with assistance of a walker  • Incentive spirometry encouraged  • Pain control as clinically indicated  • Abx as per ID  * Patient wants nonop at this time with antibiotic treatment, possible surgery at a later date  - Will speak to ID

## 2025-02-20 NOTE — PROGRESS NOTE ADULT - ASSESSMENT
68 y/o female with PMH of HTN, HLD, ESRD on HD (M/W/F), DM-2 came to the ED complaining of low back pain. Pain has been going on for few days; described as aching, radiating to both thighs anteriorly. Patient reported chronic back pain but in the past few days it has worsened; bending forward/sitting on the side exacerbate the pain.     #Acute on chronic low back pain   #Discitis/OM of L4-5  CT lumbar degenerative disc changes at L4-L5 concerning for discitis/OM   Ortho spine on board   MRI with OM/Discitis of L4/5  Will hold antibiotic for now   Blood cx neg    Repeat blood cx are ngtd  ID consult appreciated  Pain management consult appreciated  Pain control   Bedrest per ortho  Per Ortho - Offered surgery but patient wants conservative management   IR consulted for possible biopsy - Will hold plavix in case patient needs biopsy     #Right knee pain   XR knee: lateral prosthetic patellar dislocation  Seen by ortho  Pain control   PT eval     #ESRD   On HD (M/W/F)   Nephrology consulted   Monitor BMP     #HTN/HLD/CAD   Plavix 75mg IS HELD FOR POSSIBLE BIOPSY  Simvastatin 40mg   Labetalol 200mg bid   Hydralazine 50mg   Aspirin     #Hyperglycemia with DM-2   Patient not on medication   HbA1C: 5.5 (07/24/23)   Insulin sliding scale   A1C of 6.9  Lifestyle modification     #Supportive   DVT prophylaxis: Heparin sc  Diet: renal     Dispo: Pending IR eval for possible biopsy of Discitis/OM

## 2025-02-21 ENCOUNTER — RESULT REVIEW (OUTPATIENT)
Age: 70
End: 2025-02-21

## 2025-02-21 LAB
ANION GAP SERPL CALC-SCNC: 15 MMOL/L — SIGNIFICANT CHANGE UP (ref 5–17)
BASOPHILS # BLD AUTO: 0.04 K/UL — SIGNIFICANT CHANGE UP (ref 0–0.2)
BASOPHILS NFR BLD AUTO: 0.8 % — SIGNIFICANT CHANGE UP (ref 0–2)
BUN SERPL-MCNC: 33.8 MG/DL — HIGH (ref 8–20)
CALCIUM SERPL-MCNC: 9.1 MG/DL — SIGNIFICANT CHANGE UP (ref 8.4–10.5)
CHLORIDE SERPL-SCNC: 98 MMOL/L — SIGNIFICANT CHANGE UP (ref 96–108)
CO2 SERPL-SCNC: 25 MMOL/L — SIGNIFICANT CHANGE UP (ref 22–29)
CREAT SERPL-MCNC: 5.79 MG/DL — HIGH (ref 0.5–1.3)
CULTURE RESULTS: SIGNIFICANT CHANGE UP
EGFR: 8 ML/MIN/1.73M2 — LOW
EGFR: 8 ML/MIN/1.73M2 — LOW
EOSINOPHIL # BLD AUTO: 0.18 K/UL — SIGNIFICANT CHANGE UP (ref 0–0.5)
EOSINOPHIL NFR BLD AUTO: 3.5 % — SIGNIFICANT CHANGE UP (ref 0–6)
GLUCOSE BLDC GLUCOMTR-MCNC: 128 MG/DL — HIGH (ref 70–99)
GLUCOSE BLDC GLUCOMTR-MCNC: 163 MG/DL — HIGH (ref 70–99)
GLUCOSE BLDC GLUCOMTR-MCNC: 87 MG/DL — SIGNIFICANT CHANGE UP (ref 70–99)
GLUCOSE BLDC GLUCOMTR-MCNC: 99 MG/DL — SIGNIFICANT CHANGE UP (ref 70–99)
GLUCOSE SERPL-MCNC: 92 MG/DL — SIGNIFICANT CHANGE UP (ref 70–99)
HCT VFR BLD CALC: 37 % — SIGNIFICANT CHANGE UP (ref 34.5–45)
HGB BLD-MCNC: 10.5 G/DL — LOW (ref 11.5–15.5)
IMM GRANULOCYTES # BLD AUTO: 0.04 K/UL — SIGNIFICANT CHANGE UP (ref 0–0.07)
IMM GRANULOCYTES NFR BLD AUTO: 0.8 % — SIGNIFICANT CHANGE UP (ref 0–0.9)
LYMPHOCYTES # BLD AUTO: 1.75 K/UL — SIGNIFICANT CHANGE UP (ref 1–3.3)
LYMPHOCYTES NFR BLD AUTO: 33.8 % — SIGNIFICANT CHANGE UP (ref 13–44)
MCHC RBC-ENTMCNC: 26.9 PG — LOW (ref 27–34)
MCHC RBC-ENTMCNC: 28.4 G/DL — LOW (ref 32–36)
MCV RBC AUTO: 94.6 FL — SIGNIFICANT CHANGE UP (ref 80–100)
MONOCYTES # BLD AUTO: 0.68 K/UL — SIGNIFICANT CHANGE UP (ref 0–0.9)
MONOCYTES NFR BLD AUTO: 13.2 % — SIGNIFICANT CHANGE UP (ref 2–14)
NEUTROPHILS # BLD AUTO: 2.48 K/UL — SIGNIFICANT CHANGE UP (ref 1.8–7.4)
NEUTROPHILS NFR BLD AUTO: 47.9 % — SIGNIFICANT CHANGE UP (ref 43–77)
NRBC # BLD AUTO: 0 K/UL — SIGNIFICANT CHANGE UP (ref 0–0)
NRBC # FLD: 0 K/UL — SIGNIFICANT CHANGE UP (ref 0–0)
NRBC BLD AUTO-RTO: 0 /100 WBCS — SIGNIFICANT CHANGE UP (ref 0–0)
PLATELET # BLD AUTO: 306 K/UL — SIGNIFICANT CHANGE UP (ref 150–400)
PMV BLD: 11 FL — SIGNIFICANT CHANGE UP (ref 7–13)
POTASSIUM SERPL-MCNC: 4.9 MMOL/L — SIGNIFICANT CHANGE UP (ref 3.5–5.3)
POTASSIUM SERPL-SCNC: 4.9 MMOL/L — SIGNIFICANT CHANGE UP (ref 3.5–5.3)
RBC # BLD: 3.91 M/UL — SIGNIFICANT CHANGE UP (ref 3.8–5.2)
RBC # FLD: 15.4 % — HIGH (ref 10.3–14.5)
SODIUM SERPL-SCNC: 138 MMOL/L — SIGNIFICANT CHANGE UP (ref 135–145)
SPECIMEN SOURCE: SIGNIFICANT CHANGE UP
WBC # BLD: 5.17 K/UL — SIGNIFICANT CHANGE UP (ref 3.8–10.5)
WBC # FLD AUTO: 5.17 K/UL — SIGNIFICANT CHANGE UP (ref 3.8–10.5)

## 2025-02-21 PROCEDURE — 93306 TTE W/DOPPLER COMPLETE: CPT | Mod: 26

## 2025-02-21 PROCEDURE — 90935 HEMODIALYSIS ONE EVALUATION: CPT

## 2025-02-21 PROCEDURE — 99232 SBSQ HOSP IP/OBS MODERATE 35: CPT

## 2025-02-21 RX ADMIN — LABETALOL HYDROCHLORIDE 200 MILLIGRAM(S): 200 TABLET, FILM COATED ORAL at 18:30

## 2025-02-21 RX ADMIN — Medication 50 MILLIGRAM(S): at 05:49

## 2025-02-21 RX ADMIN — Medication 975 MILLIGRAM(S): at 06:46

## 2025-02-21 RX ADMIN — GABAPENTIN 300 MILLIGRAM(S): 400 CAPSULE ORAL at 18:30

## 2025-02-21 RX ADMIN — Medication 1 APPLICATION(S): at 11:10

## 2025-02-21 RX ADMIN — ATORVASTATIN CALCIUM 20 MILLIGRAM(S): 80 TABLET, FILM COATED ORAL at 21:07

## 2025-02-21 RX ADMIN — Medication 4 MILLIGRAM(S): at 07:00

## 2025-02-21 RX ADMIN — FOLIC ACID 1 MILLIGRAM(S): 1 TABLET ORAL at 11:10

## 2025-02-21 RX ADMIN — Medication 975 MILLIGRAM(S): at 05:49

## 2025-02-21 RX ADMIN — EPOETIN ALFA 10000 UNIT(S): 10000 SOLUTION INTRAVENOUS; SUBCUTANEOUS at 12:15

## 2025-02-21 RX ADMIN — Medication 4 MILLIGRAM(S): at 14:48

## 2025-02-21 RX ADMIN — MONTELUKAST SODIUM 10 MILLIGRAM(S): 10 TABLET ORAL at 11:10

## 2025-02-21 RX ADMIN — LABETALOL HYDROCHLORIDE 200 MILLIGRAM(S): 200 TABLET, FILM COATED ORAL at 05:49

## 2025-02-21 RX ADMIN — GABAPENTIN 300 MILLIGRAM(S): 400 CAPSULE ORAL at 05:49

## 2025-02-21 RX ADMIN — HEPARIN SODIUM 5000 UNIT(S): 1000 INJECTION INTRAVENOUS; SUBCUTANEOUS at 18:30

## 2025-02-21 RX ADMIN — Medication 50 MILLIGRAM(S): at 21:07

## 2025-02-21 RX ADMIN — Medication 4 MILLIGRAM(S): at 06:00

## 2025-02-21 RX ADMIN — HEPARIN SODIUM 5000 UNIT(S): 1000 INJECTION INTRAVENOUS; SUBCUTANEOUS at 08:31

## 2025-02-21 RX ADMIN — Medication 4 MILLIGRAM(S): at 18:00

## 2025-02-21 NOTE — PROGRESS NOTE ADULT - ASSESSMENT
70 y/o female with PMH of HTN, HLD, ESRD on HD (M/W/F), DM-2 came to the ED complaining of low back pain. Pain has been going on for few days; described as aching, radiating to both thighs anteriorly. Patient reported chronic back pain but in the past few days it has worsened; bending forward/sitting on the side exacerbate the pain.     #Acute on chronic low back pain   #Discitis/OM of L4-5  CT lumbar degenerative disc changes at L4-L5 concerning for discitis/OM   Ortho spine on board   MRI with OM/Discitis of L4/5  Will hold antibiotic for now   Blood cx neg    Repeat blood cx are ngtd  ID following   Pain management following   Pain control   Bedrest per ortho  Per Ortho - Offered surgery but patient wants conservative management   IR consulted for possible biopsy - biopsy tues by IR, asa/plavix on hold for 5 days    #Right knee pain   XR knee: lateral prosthetic patellar dislocation  Seen by ortho  Pain control   PT eval     #ESRD   On HD (M/W/F)   Nephrology following     #HTN/HLD/CAD   Aspirin /plavix 75mg IS HELD FOR BIOPSY  Simvastatin 40mg   Labetalol 200mg bid   Hydralazine 50mg       #Hyperglycemia with DM-2   Patient not on medication   HbA1C: 5.5 (07/24/23)   Insulin sliding scale   A1C of 6.9  Lifestyle modification     #Supportive   DVT prophylaxis: Heparin sc  Diet: renal     Dispo: Pending IR biopsy to r/o Discitis/OM

## 2025-02-21 NOTE — PROGRESS NOTE ADULT - ASSESSMENT
70 y/o female with PMH of HTN, HLD, ESRD on HD (M/W/F), DM-2 came to the ED complaining of low back pain. Pain has been going on for few days; described as aching, radiating to both thighs anteriorly. Patient reported chronic back pain but in the past few days it has worsened; bending forward/sitting on the side exacerbate the pain.      Plan:   Pain controlled. Continue same regimen:    -c/wacetaminophen Tablet  975 milliGRAM(s) Oral every 8 hours  -c/w HYDROmorphone  PO 2mg q6hrs PRN for moderate pain  -c/w HYDROmorphone PO 4mg q6hrs for severe pain    68 y/o female with PMH of HTN, HLD, ESRD on HD (M/W/F), DM-2 came to the ED complaining of low back pain. Currently, being followed for questionable OM/Discitis vs severe spondylosis in setting of brown tumor.    Plan:   Pain controlled. Continue same regimen:    -c/wacetaminophen Tablet  975 milliGRAM(s) Oral every 8 hours  -c/w HYDROmorphone  PO 2mg q6hrs PRN for moderate pain  -c/w HYDROmorphone PO 4mg q6hrs for severe pain

## 2025-02-21 NOTE — PROGRESS NOTE ADULT - SUBJECTIVE AND OBJECTIVE BOX
Patient is a 69y Female being followed for questionable OM/Discitis vs severe spondylosis in setting of brown tumor.    S/ Patient seen at bedside this morning. Patient reports pain to be controlled on current medications.   Patient denies sedation with medications          Analgesic Dosing for past 24 hours reviewed as below:  acetaminophen     Tablet ..   975 milliGRAM(s) Oral (02-21-25 @ 05:49)   975 milliGRAM(s) Oral (02-20-25 @ 21:45)   975 milliGRAM(s) Oral (02-20-25 @ 13:04)    gabapentin   300 milliGRAM(s) Oral (02-21-25 @ 05:49)   300 milliGRAM(s) Oral (02-20-25 @ 21:45)   300 milliGRAM(s) Oral (02-20-25 @ 13:04)    HYDROmorphone   Tablet   4 milliGRAM(s) Oral (02-21-25 @ 06:00)   4 milliGRAM(s) Oral (02-20-25 @ 16:26)          T(C): 36.7 (02-21-25 @ 12:01), Max: 36.9 (02-20-25 @ 17:26)  HR: 70 (02-21-25 @ 12:01) (63 - 94)  BP: 163/55 (02-21-25 @ 12:01) (112/55 - 186/62)  RR: 18 (02-21-25 @ 12:01) (17 - 19)  SpO2: 96% (02-21-25 @ 12:01) (85% - 99%)      02-20-25 @ 07:01  -  02-21-25 @ 07:00  --------------------------------------------------------  IN: 250 mL / OUT: 0 mL / NET: 250 mL        acetaminophen     Tablet .. 650 milliGRAM(s) Oral every 6 hours PRN  albuterol    90 MICROgram(s) HFA Inhaler 2 Puff(s) Inhalation every 6 hours PRN  aluminum hydroxide/magnesium hydroxide/simethicone Suspension 30 milliLiter(s) Oral every 4 hours PRN  atorvastatin 20 milliGRAM(s) Oral at bedtime  chlorhexidine 2% Cloths 1 Application(s) Topical daily  dextrose 5%. 1000 milliLiter(s) IV Continuous <Continuous>  dextrose 5%. 1000 milliLiter(s) IV Continuous <Continuous>  dextrose 50% Injectable 25 Gram(s) IV Push once  dextrose 50% Injectable 12.5 Gram(s) IV Push once  dextrose 50% Injectable 25 Gram(s) IV Push once  dextrose Oral Gel 15 Gram(s) Oral once PRN  epoetin dereck-epbx (RETACRIT) Injectable 29438 Unit(s) IV Push <User Schedule>  folic acid 1 milliGRAM(s) Oral daily  gabapentin 300 milliGRAM(s) Oral three times a day  glucagon  Injectable 1 milliGRAM(s) IntraMuscular once  heparin   Injectable 5000 Unit(s) SubCutaneous every 8 hours  hydrALAZINE 50 milliGRAM(s) Oral three times a day  HYDROmorphone   Tablet 2 milliGRAM(s) Oral every 6 hours PRN  HYDROmorphone   Tablet 4 milliGRAM(s) Oral every 6 hours PRN  HYDROmorphone  Injectable 0.5 milliGRAM(s) IV Push every 4 hours PRN  insulin lispro (ADMELOG) corrective regimen sliding scale   SubCutaneous three times a day before meals  insulin lispro (ADMELOG) corrective regimen sliding scale   SubCutaneous at bedtime  labetalol 200 milliGRAM(s) Oral two times a day  melatonin 3 milliGRAM(s) Oral at bedtime PRN  montelukast 10 milliGRAM(s) Oral daily  naloxone Injectable 1 milliGRAM(s) IV Push once PRN  ondansetron Injectable 4 milliGRAM(s) IV Push every 8 hours PRN  senna 2 Tablet(s) Oral at bedtime                          10.5   5.17  )-----------( 306      ( 21 Feb 2025 05:28 )             37.0     02-21    138  |  98  |  33.8[H]  ----------------------------<  92  4.9   |  25.0  |  5.79[H]    Ca    9.1      21 Feb 2025 05:28        Urinalysis Basic - ( 21 Feb 2025 05:28 )    Color: x / Appearance: x / SG: x / pH: x  Gluc: 92 mg/dL / Ketone: x  / Bili: x / Urobili: x   Blood: x / Protein: x / Nitrite: x   Leuk Esterase: x / RBC: x / WBC x   Sq Epi: x / Non Sq Epi: x / Bacteria: x        Pain Service   [phone]

## 2025-02-21 NOTE — PROGRESS NOTE ADULT - ASSESSMENT
68 yo female with history of HTN, HLD, ESRD on HD (M/W/F), DM-2 came to ED complaints of lower back pain and lower extremity weakness. Her CT of L-spine showed destructive process centered at the L4-5 disc space with endplate erosions, sclerosis, and grade 1 anterolisthesis, she was seen by Orthopedics, who recommended to do MRI of the spine with iv contrast to rule out discitis/osteomyelitis of the L-spine.   Nephrology was consulted for HD needs.     - ESRD on HD at [facility] unit MWF  HD access RUE AVG  Fluid status euvolemic  - HTN BP is uncontrolled, could be due to pain   - Anemia of CKD - Hb is below goal   - CKD MDB Hyperphosphatemia   - Secondary hyperparathyroidism   - Lower back pain, with destructive lesions in her spine, rule out spinal metastasis    Plan   HD today   HD today 3 hrs  ml/min 3 K bath Goal UF 1-2L as tolerated by BP  Dose medications for HD   Aranesp 40 mg weekly with HD     Seen on HD.  Qd, Qb, UF rate reviewed.  Vitals stable.  Access working well.  Patient tolerating HD well. (c/o pain)    Rest of management as per primary team   Discussed with primary team  Will follow

## 2025-02-21 NOTE — PROGRESS NOTE ADULT - SUBJECTIVE AND OBJECTIVE BOX
Reason for visit: ESRD    Subjective: No acute overnight event. Patient denied any cardiac or urinary complains. No fever/chills.   Seen on HD   reports pain controlled  Going for biopsy    ROS: All systems were reviewed in detail pertinent positive and negative mentioned above, rest are negative.    Physical Exam:  Gen: no acute distress  MS: alert, conversing normally  Eyes: EOMI, no icterus  HENT: NCAT, MMM  CV: rhythm reg reg, rate normal, no m/g/r  Chest: CTAB, no w/r/r,  Abd: soft, NT, ND  Extremities: No edema    Vital Signs Last 24 Hrs  T(C): 36.7 (21 Feb 2025 12:01), Max: 36.9 (20 Feb 2025 17:26)  T(F): 98 (21 Feb 2025 12:01), Max: 98.4 (20 Feb 2025 17:26)  HR: 70 (21 Feb 2025 12:01) (63 - 94)  BP: 163/55 (21 Feb 2025 12:01) (112/55 - 186/62)  BP(mean): 81 (21 Feb 2025 08:54) (81 - 81)  RR: 18 (21 Feb 2025 12:01) (17 - 19)  SpO2: 96% (21 Feb 2025 12:01) (85% - 99%)    Parameters below as of 21 Feb 2025 12:01  Patient On (Oxygen Delivery Method): room air      I&O's Summary    20 Feb 2025 07:01  -  21 Feb 2025 07:00  --------------------------------------------------------  IN: 250 mL / OUT: 0 mL / NET: 250 mL      Current Antibiotics:    Other medications:  atorvastatin 20 milliGRAM(s) Oral at bedtime  chlorhexidine 2% Cloths 1 Application(s) Topical daily  dextrose 5%. 1000 milliLiter(s) IV Continuous <Continuous>  dextrose 5%. 1000 milliLiter(s) IV Continuous <Continuous>  dextrose 50% Injectable 25 Gram(s) IV Push once  dextrose 50% Injectable 12.5 Gram(s) IV Push once  dextrose 50% Injectable 25 Gram(s) IV Push once  epoetin dereck-epbx (RETACRIT) Injectable 18708 Unit(s) IV Push <User Schedule>  folic acid 1 milliGRAM(s) Oral daily  gabapentin 300 milliGRAM(s) Oral three times a day  glucagon  Injectable 1 milliGRAM(s) IntraMuscular once  heparin   Injectable 5000 Unit(s) SubCutaneous every 8 hours  hydrALAZINE 50 milliGRAM(s) Oral three times a day  insulin lispro (ADMELOG) corrective regimen sliding scale   SubCutaneous three times a day before meals  insulin lispro (ADMELOG) corrective regimen sliding scale   SubCutaneous at bedtime  labetalol 200 milliGRAM(s) Oral two times a day  montelukast 10 milliGRAM(s) Oral daily  senna 2 Tablet(s) Oral at bedtime    02-21    138  |  98  |  33.8[H]  ----------------------------<  92  4.9   |  25.0  |  5.79[H]    Ca    9.1      21 Feb 2025 05:28                          10.5   5.17  )-----------( 306      ( 21 Feb 2025 05:28 )             37.0     Creatinine: 5.79 mg/dL (02-21-25 @ 05:28)  Creatinine: 4.33 mg/dL (02-20-25 @ 09:25)  Creatinine: 3.91 mg/dL (02-20-25 @ 06:16)  Creatinine: 3.77 mg/dL (02-19-25 @ 06:08)  Creatinine: 5.56 mg/dL (02-18-25 @ 05:11)  Creatinine: 8.75 mg/dL (02-17-25 @ 05:20)

## 2025-02-21 NOTE — PROGRESS NOTE ADULT - SUBJECTIVE AND OBJECTIVE BOX
[id number]  EULOGIO NIELSEN    Patient is a 69y Female being followed for questionable OM/Discitis vs severe spondylosis in setting of brown tumor. Patient seen and evaluated at bedside. Patient is doing well. Patient at this time wants to do nonop and antibiotics. Denies any acute motor or sensory changes. Denies any fever/chills, SOB, CP, N/V, numbness/tingling. No other orthopedic complaints.     Vital Signs Last 24 Hrs  T(C): 36.8 (21 Feb 2025 08:54), Max: 36.9 (20 Feb 2025 17:26)  T(F): 98.2 (21 Feb 2025 08:54), Max: 98.4 (20 Feb 2025 17:26)  HR: 67 (21 Feb 2025 08:54) (63 - 94)  BP: 136/53 (21 Feb 2025 08:54) (112/55 - 186/62)  BP(mean): 81 (21 Feb 2025 08:54) (81 - 81)  RR: 18 (21 Feb 2025 08:54) (17 - 19)  SpO2: 94% (21 Feb 2025 08:54) (85% - 99%)    Parameters below as of 21 Feb 2025 08:54  Patient On (Oxygen Delivery Method): room air                          10.5   5.17  )-----------( 306      ( 21 Feb 2025 05:28 )             37.0       Appearance: Alert, responsive, in no acute distress.  Skin: no rash on visible skin. Skin is clean, dry and intact. No bleeding. No abrasions. No ulcerations.  Musculoskeletal:       Left Lower Extremity: Sensation is grossly intact to light touch. 2+ distal pulses. Cap refill < 2 sec.      Right Lower Extremity: Sensation is grossly intact to light touch. 2+ distal pulses. Cap refill < 2 sec.   Motor exam: [  ]             [ ] Lower extremity                    HF(l2)    KE(l3)   TA(l4)  EHL(l5)    GS(s1)                                                 R        5/5        5/5        4/5       4/5         5/5                                               L         5/5        5/5       5/5       5/5          5/5    02-19-25 @ 07:01  -  02-20-25 @ 07:00  --------------------------------------------------------  IN: 100 mL / OUT: 500 mL / NET: -400 mL        A/P: Patient is a 69y Female being followed for questionable OM/Discitis vs severe spondylosis in setting of brown tumor.    • DVT prophylaxis as prescribed, including use of compression devices and ankle pumps  • Continue physical therapy  • Out of Bed as tolerated with assistance of a walker  • Incentive spirometry encouraged  • Pain control as clinically indicated  • Abx as per ID  * Patient wants nonop at this time with antibiotic treatment, possible surgery at a later date  * Poss IR aspiration  * recc repeat MRI within 4 weeks

## 2025-02-21 NOTE — PROGRESS NOTE ADULT - SUBJECTIVE AND OBJECTIVE BOX
seen for back pain, esrd    pain ok when not moving  ros negative     MEDICATIONS  (STANDING):  atorvastatin 20 milliGRAM(s) Oral at bedtime  chlorhexidine 2% Cloths 1 Application(s) Topical daily  dextrose 5%. 1000 milliLiter(s) (100 mL/Hr) IV Continuous <Continuous>  dextrose 5%. 1000 milliLiter(s) (50 mL/Hr) IV Continuous <Continuous>  dextrose 50% Injectable 25 Gram(s) IV Push once  dextrose 50% Injectable 12.5 Gram(s) IV Push once  dextrose 50% Injectable 25 Gram(s) IV Push once  epoetin dereck-epbx (RETACRIT) Injectable 72087 Unit(s) IV Push <User Schedule>  folic acid 1 milliGRAM(s) Oral daily  gabapentin 300 milliGRAM(s) Oral three times a day  glucagon  Injectable 1 milliGRAM(s) IntraMuscular once  heparin   Injectable 5000 Unit(s) SubCutaneous every 8 hours  hydrALAZINE 50 milliGRAM(s) Oral three times a day  insulin lispro (ADMELOG) corrective regimen sliding scale   SubCutaneous three times a day before meals  insulin lispro (ADMELOG) corrective regimen sliding scale   SubCutaneous at bedtime  labetalol 200 milliGRAM(s) Oral two times a day  montelukast 10 milliGRAM(s) Oral daily  senna 2 Tablet(s) Oral at bedtime    MEDICATIONS  (PRN):  acetaminophen     Tablet .. 650 milliGRAM(s) Oral every 6 hours PRN Temp greater or equal to 38C (100.4F), Mild Pain (1 - 3)  albuterol    90 MICROgram(s) HFA Inhaler 2 Puff(s) Inhalation every 6 hours PRN for shortness of breath and/or wheezing  aluminum hydroxide/magnesium hydroxide/simethicone Suspension 30 milliLiter(s) Oral every 4 hours PRN Dyspepsia  dextrose Oral Gel 15 Gram(s) Oral once PRN Blood Glucose LESS THAN 70 milliGRAM(s)/deciliter  HYDROmorphone   Tablet 2 milliGRAM(s) Oral every 6 hours PRN Moderate Pain (4 - 6)  HYDROmorphone   Tablet 4 milliGRAM(s) Oral every 6 hours PRN Severe Pain (7 - 10)  HYDROmorphone  Injectable 0.5 milliGRAM(s) IV Push every 4 hours PRN Breakthrough pain  melatonin 3 milliGRAM(s) Oral at bedtime PRN Insomnia  naloxone Injectable 1 milliGRAM(s) IV Push once PRN For Opioid OD  ondansetron Injectable 4 milliGRAM(s) IV Push every 8 hours PRN Nausea and/or Vomiting      Allergies    lip swelling with lobster (Swelling)  erythromycin (Vomiting)  shellfish (Swelling; Hives)         Vital Signs Last 24 Hrs  T(C): 36.7 (21 Feb 2025 12:01), Max: 36.9 (20 Feb 2025 17:26)  T(F): 98 (21 Feb 2025 12:01), Max: 98.4 (20 Feb 2025 17:26)  HR: 70 (21 Feb 2025 12:01) (63 - 94)  BP: 163/55 (21 Feb 2025 12:01) (112/55 - 186/62)  BP(mean): 81 (21 Feb 2025 08:54) (81 - 81)  RR: 18 (21 Feb 2025 12:01) (17 - 19)  SpO2: 96% (21 Feb 2025 12:01) (85% - 99%)    Parameters below as of 21 Feb 2025 12:01  Patient On (Oxygen Delivery Method): room air        PHYSICAL EXAM:    GENERAL: NAD   CHEST/LUNG: Clear to ausculation bilaterally;   HEART: Regular rate and rhythm; S1 S2;   ABDOMEN: Soft, Nontender, Bowel sounds present  EXTREMITIES:  no edema   NERVOUS SYSTEM:  Alert & Oriented X3 gen weakness nonfocal    LABS:                        10.5   5.17  )-----------( 306      ( 21 Feb 2025 05:28 )             37.0     02-21    138  |  98  |  33.8[H]  ----------------------------<  92  4.9   |  25.0  |  5.79[H]    Ca    9.1      21 Feb 2025 05:28        Urinalysis Basic - ( 21 Feb 2025 05:28 )    Color: x / Appearance: x / SG: x / pH: x  Gluc: 92 mg/dL / Ketone: x  / Bili: x / Urobili: x   Blood: x / Protein: x / Nitrite: x   Leuk Esterase: x / RBC: x / WBC x   Sq Epi: x / Non Sq Epi: x / Bacteria: x        CAPILLARY BLOOD GLUCOSE      POCT Blood Glucose.: 128 mg/dL (21 Feb 2025 11:12)  POCT Blood Glucose.: 99 mg/dL (21 Feb 2025 08:29)  POCT Blood Glucose.: 135 mg/dL (20 Feb 2025 21:51)  POCT Blood Glucose.: 151 mg/dL (20 Feb 2025 16:18)        RADIOLOGY & ADDITIONAL TESTS:   no psychiatric contraindications to discharge

## 2025-02-22 DIAGNOSIS — E78.5 HYPERLIPIDEMIA, UNSPECIFIED: ICD-10-CM

## 2025-02-22 DIAGNOSIS — I25.10 ATHEROSCLEROTIC HEART DISEASE OF NATIVE CORONARY ARTERY WITHOUT ANGINA PECTORIS: ICD-10-CM

## 2025-02-22 DIAGNOSIS — E11.9 TYPE 2 DIABETES MELLITUS WITHOUT COMPLICATIONS: ICD-10-CM

## 2025-02-22 DIAGNOSIS — N18.6 END STAGE RENAL DISEASE: ICD-10-CM

## 2025-02-22 DIAGNOSIS — M46.46 DISCITIS, UNSPECIFIED, LUMBAR REGION: ICD-10-CM

## 2025-02-22 LAB
GLUCOSE BLDC GLUCOMTR-MCNC: 105 MG/DL — HIGH (ref 70–99)
GLUCOSE BLDC GLUCOMTR-MCNC: 138 MG/DL — HIGH (ref 70–99)
GLUCOSE BLDC GLUCOMTR-MCNC: 181 MG/DL — HIGH (ref 70–99)
GLUCOSE BLDC GLUCOMTR-MCNC: 207 MG/DL — HIGH (ref 70–99)

## 2025-02-22 PROCEDURE — 99233 SBSQ HOSP IP/OBS HIGH 50: CPT

## 2025-02-22 RX ADMIN — Medication 4 MILLIGRAM(S): at 21:30

## 2025-02-22 RX ADMIN — ATORVASTATIN CALCIUM 20 MILLIGRAM(S): 80 TABLET, FILM COATED ORAL at 21:10

## 2025-02-22 RX ADMIN — GABAPENTIN 300 MILLIGRAM(S): 400 CAPSULE ORAL at 01:34

## 2025-02-22 RX ADMIN — INSULIN LISPRO 1: 100 INJECTION, SOLUTION INTRAVENOUS; SUBCUTANEOUS at 11:19

## 2025-02-22 RX ADMIN — Medication 4 MILLIGRAM(S): at 11:41

## 2025-02-22 RX ADMIN — HEPARIN SODIUM 5000 UNIT(S): 1000 INJECTION INTRAVENOUS; SUBCUTANEOUS at 01:33

## 2025-02-22 RX ADMIN — Medication 4 MILLIGRAM(S): at 20:30

## 2025-02-22 RX ADMIN — GABAPENTIN 300 MILLIGRAM(S): 400 CAPSULE ORAL at 17:58

## 2025-02-22 RX ADMIN — Medication 0.5 MILLIGRAM(S): at 15:22

## 2025-02-22 RX ADMIN — Medication 50 MILLIGRAM(S): at 05:47

## 2025-02-22 RX ADMIN — Medication 4 MILLIGRAM(S): at 10:41

## 2025-02-22 RX ADMIN — HEPARIN SODIUM 5000 UNIT(S): 1000 INJECTION INTRAVENOUS; SUBCUTANEOUS at 17:58

## 2025-02-22 RX ADMIN — Medication 0.5 MILLIGRAM(S): at 14:22

## 2025-02-22 RX ADMIN — GABAPENTIN 300 MILLIGRAM(S): 400 CAPSULE ORAL at 10:08

## 2025-02-22 RX ADMIN — MONTELUKAST SODIUM 10 MILLIGRAM(S): 10 TABLET ORAL at 10:09

## 2025-02-22 RX ADMIN — HEPARIN SODIUM 5000 UNIT(S): 1000 INJECTION INTRAVENOUS; SUBCUTANEOUS at 10:08

## 2025-02-22 RX ADMIN — Medication 50 MILLIGRAM(S): at 13:18

## 2025-02-22 RX ADMIN — LABETALOL HYDROCHLORIDE 200 MILLIGRAM(S): 200 TABLET, FILM COATED ORAL at 05:47

## 2025-02-22 RX ADMIN — Medication 1 APPLICATION(S): at 10:14

## 2025-02-22 RX ADMIN — LABETALOL HYDROCHLORIDE 200 MILLIGRAM(S): 200 TABLET, FILM COATED ORAL at 17:58

## 2025-02-22 NOTE — PROGRESS NOTE ADULT - SUBJECTIVE AND OBJECTIVE BOX
Patient is a 69y old  Female who presents with a chief complaint of Pain management (21 Feb 2025 12:11)    c/o lower back pain.denies weakness,numbness,fever,chill,cp,sob  REVIEW OF SYSTEMS: All systems are reviewed and found to be negative except above    MEDICATIONS  (STANDING):  atorvastatin 20 milliGRAM(s) Oral at bedtime  chlorhexidine 2% Cloths 1 Application(s) Topical daily  dextrose 5%. 1000 milliLiter(s) (50 mL/Hr) IV Continuous <Continuous>  dextrose 5%. 1000 milliLiter(s) (100 mL/Hr) IV Continuous <Continuous>  dextrose 50% Injectable 25 Gram(s) IV Push once  dextrose 50% Injectable 12.5 Gram(s) IV Push once  dextrose 50% Injectable 25 Gram(s) IV Push once  epoetin dereck-epbx (RETACRIT) Injectable 45728 Unit(s) IV Push <User Schedule>  folic acid 1 milliGRAM(s) Oral daily  gabapentin 300 milliGRAM(s) Oral three times a day  glucagon  Injectable 1 milliGRAM(s) IntraMuscular once  heparin   Injectable 5000 Unit(s) SubCutaneous every 8 hours  hydrALAZINE 50 milliGRAM(s) Oral three times a day  insulin lispro (ADMELOG) corrective regimen sliding scale   SubCutaneous three times a day before meals  insulin lispro (ADMELOG) corrective regimen sliding scale   SubCutaneous at bedtime  labetalol 200 milliGRAM(s) Oral two times a day  montelukast 10 milliGRAM(s) Oral daily  senna 2 Tablet(s) Oral at bedtime    MEDICATIONS  (PRN):  acetaminophen     Tablet .. 650 milliGRAM(s) Oral every 6 hours PRN Temp greater or equal to 38C (100.4F), Mild Pain (1 - 3)  albuterol    90 MICROgram(s) HFA Inhaler 2 Puff(s) Inhalation every 6 hours PRN for shortness of breath and/or wheezing  aluminum hydroxide/magnesium hydroxide/simethicone Suspension 30 milliLiter(s) Oral every 4 hours PRN Dyspepsia  dextrose Oral Gel 15 Gram(s) Oral once PRN Blood Glucose LESS THAN 70 milliGRAM(s)/deciliter  HYDROmorphone   Tablet 2 milliGRAM(s) Oral every 6 hours PRN Moderate Pain (4 - 6)  HYDROmorphone   Tablet 4 milliGRAM(s) Oral every 6 hours PRN Severe Pain (7 - 10)  HYDROmorphone  Injectable 0.5 milliGRAM(s) IV Push every 4 hours PRN Breakthrough pain  melatonin 3 milliGRAM(s) Oral at bedtime PRN Insomnia  naloxone Injectable 1 milliGRAM(s) IV Push once PRN For Opioid OD  ondansetron Injectable 4 milliGRAM(s) IV Push every 8 hours PRN Nausea and/or Vomiting      CAPILLARY BLOOD GLUCOSE      POCT Blood Glucose.: 181 mg/dL (22 Feb 2025 11:18)  POCT Blood Glucose.: 105 mg/dL (22 Feb 2025 08:03)  POCT Blood Glucose.: 163 mg/dL (21 Feb 2025 21:05)  POCT Blood Glucose.: 87 mg/dL (21 Feb 2025 14:58)    I&O's Summary    21 Feb 2025 07:01  -  22 Feb 2025 07:00  --------------------------------------------------------  IN: 600 mL / OUT: 500 mL / NET: 100 mL        PHYSICAL EXAM:  Vital Signs Last 24 Hrs  T(C): 36.7 (22 Feb 2025 10:40), Max: 36.9 (21 Feb 2025 18:26)  T(F): 98 (22 Feb 2025 10:40), Max: 98.4 (21 Feb 2025 18:26)  HR: 76 (22 Feb 2025 10:40) (69 - 83)  BP: 119/62 (22 Feb 2025 10:40) (119/60 - 139/64)  BP(mean): --  RR: 17 (22 Feb 2025 10:40) (16 - 18)  SpO2: 96% (22 Feb 2025 10:40) (96% - 100%)    Parameters below as of 22 Feb 2025 10:40  Patient On (Oxygen Delivery Method): room air        CONSTITUTIONAL: NAD,  EYES: PERRLA; conjunctiva and sclera clear  ENMT: Moist oral mucosa,   RESPIRATORY: Normal respiratory effort; lungs are clear to auscultation bilaterally  CARDIOVASCULAR: Regular rate and rhythm, normal S1 and S2, no murmur   EXTS: No lower extremity edema; Peripheral pulses are 2+ bilaterally  ABDOMEN: Nontender to palpation, normoactive bowel sounds, no rebound/guarding;   MUSCLOSKELETAL:  Spines/back:  no joint swelling , tenderness to palpation lower back   PSYCH: affect appropriate  NEUROLOGY: A+O to person, place, and time; CN 2-12 are intact and symmetric; no gross sensory deficits;       LABS:                        10.5   5.17  )-----------( 306      ( 21 Feb 2025 05:28 )             37.0     02-21    138  |  98  |  33.8[H]  ----------------------------<  92  4.9   |  25.0  |  5.79[H]    Ca    9.1      21 Feb 2025 05:28            Urinalysis Basic - ( 21 Feb 2025 05:28 )    Color: x / Appearance: x / SG: x / pH: x  Gluc: 92 mg/dL / Ketone: x  / Bili: x / Urobili: x   Blood: x / Protein: x / Nitrite: x   Leuk Esterase: x / RBC: x / WBC x   Sq Epi: x / Non Sq Epi: x / Bacteria: x          RADIOLOGY & ADDITIONAL TESTS:  Results Reviewed:   Imaging Personally Reviewed:  Electrocardiogram Personally Reviewed:    COORDINATION OF CARE:  Care Discussed with Consultants/Other Providers [Y/N]:  Prior or Outpatient Records Reviewed [Y/N]:

## 2025-02-22 NOTE — PROGRESS NOTE ADULT - ASSESSMENT
68 y/o female with PMH of HTN, HLD, ESRD on HD (M/W/F), DM-2 came to the ED complaining of low back pain.Found to have Discitis/OM of L4-5 on mri spine,ct lumbar spine.Per Ortho - Offered surgery but patient wants conservative management   IR consulted for possible biopsy - biopsy tues by IR, asa/plavix on hold for 5 days.    #Acute on chronic low back pain   #Discitis/OM of L4-5  CT lumbar degenerative disc changes at L4-L5 concerning for discitis/OM   MRI with OM/Discitis of L4/5  Will hold antibiotic for now per ID  Blood cx neg    Pain management following   Bedrest per ortho  Per Ortho - Offered surgery but patient wants conservative management   IR consulted for possible biopsy - biopsy tues by IR, asa/plavix on hold for 5 days    #Right knee pain   XR knee: lateral prosthetic patellar dislocation  Seen by ortho  Pain control   PT eval     #ESRD   On HD (M/W/F)   Nephrology following     #HTN/HLD/CAD   Aspirin /plavix 75mg IS HELD FOR BIOPSY  Simvastatin 40mg   Labetalol 200mg bid   Hydralazine 50mg       #Hyperglycemia with DM-2   Patient not on medication   HbA1C: 5.5 (07/24/23)   Insulin sliding scale   A1C of 6.9  Lifestyle modification     DVT prophylaxis: Heparin sc      Dispo: Pending IR biopsy to r/o Discitis/OM 68 y/o female with PMH of HTN, HLD, ESRD on HD (M/W/F), DM-2 came to the ED complaining of low back pain.Found to have Discitis/OM of L4-5 on mri spine,ct lumbar spine.Per Ortho - Offered surgery but patient wants conservative management   IR consulted for possible biopsy - biopsy tues by IR, asa/plavix on hold for 5 days.    #Acute on chronic low back pain   #Discitis/OM of L4-5  CT lumbar degenerative disc changes at L4-L5 concerning for discitis/OM   MRI with OM/Discitis of L4/5  Will hold antibiotic for now per ID  Blood cx neg    Pain management following   Bedrest per ortho  Per Ortho - Offered surgery but patient wants conservative management   IR consulted for possible biopsy - biopsy tues by IR, asa/plavix on hold for 5 days    #Right knee pain   XR knee: lateral prosthetic patellar dislocation  Seen by ortho  Pain control   PT eval     #ESRD   On HD (M/W/F)   Nephrology following     #HTN/HLD/CAD   Aspirin /plavix 75mg IS HELD FOR BIOPSY  Simvastatin 40mg   Labetalol 200mg bid   Hydralazine 50mg       #Hyperglycemia with DM-2   Patient not on medication   HbA1C: 5.5 (07/24/23)   Insulin sliding scale   A1C of 6.9  Lifestyle modification     DVT prophylaxis: Heparin sc      Dispo: Pending IR biopsy to r/o Discitis/OM  dw pt

## 2025-02-22 NOTE — PROGRESS NOTE ADULT - SUBJECTIVE AND OBJECTIVE BOX
Patient is a 69y Female being followed for questionable OM/Discitis vs severe spondylosis.  Patient at this time wants to do nonop and antibiotics. Denies any acute motor or sensory changes. Denies any fever/chills, SOB, CP, N/V, numbness/tingling. No other orthopedic complaints.       Vital Signs Last 24 Hrs  T(C): 36.7 (22 Feb 2025 10:40), Max: 36.9 (21 Feb 2025 18:26)  T(F): 98 (22 Feb 2025 10:40), Max: 98.4 (21 Feb 2025 18:26)  HR: 76 (22 Feb 2025 10:40) (69 - 83)  BP: 119/62 (22 Feb 2025 10:40) (119/60 - 163/55)  BP(mean): --  RR: 17 (22 Feb 2025 10:40) (16 - 18)  SpO2: 96% (22 Feb 2025 10:40) (96% - 100%)    Parameters below as of 22 Feb 2025 10:40  Patient On (Oxygen Delivery Method): room air               Appearance: Alert, responsive, in no acute distress.  Skin: no rash on visible skin. Skin is clean, dry and intact. No bleeding. No abrasions. No ulcerations.  Musculoskeletal:       Left Lower Extremity: Sensation is grossly intact to light touch. 2+ distal pulses. Cap refill < 2 sec.      Right Lower Extremity: Sensation is grossly intact to light touch. 2+ distal pulses. Cap refill < 2 sec.   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    • DVT prophylaxis as prescribed, including use of compression devices and ankle pumps  • Continue physical therapy  • Out of Bed as tolerated with assistance of a walker  • Incentive spirometry encouraged  • Pain control as clinically indicated  • Abx as per ID  * Patient wants nonop at this time with antibiotic treatment, possible surgery at a later date  * Poss IR biopsy Monday  * [facility] repeat MRI within 4 weeks

## 2025-02-23 LAB
ANION GAP SERPL CALC-SCNC: 13 MMOL/L — SIGNIFICANT CHANGE UP (ref 5–17)
BUN SERPL-MCNC: 31.8 MG/DL — HIGH (ref 8–20)
CALCIUM SERPL-MCNC: 9.1 MG/DL — SIGNIFICANT CHANGE UP (ref 8.4–10.5)
CHLORIDE SERPL-SCNC: 94 MMOL/L — LOW (ref 96–108)
CO2 SERPL-SCNC: 26 MMOL/L — SIGNIFICANT CHANGE UP (ref 22–29)
CREAT SERPL-MCNC: 5.91 MG/DL — HIGH (ref 0.5–1.3)
EGFR: 7 ML/MIN/1.73M2 — LOW
EGFR: 7 ML/MIN/1.73M2 — LOW
GLUCOSE BLDC GLUCOMTR-MCNC: 161 MG/DL — HIGH (ref 70–99)
GLUCOSE BLDC GLUCOMTR-MCNC: 163 MG/DL — HIGH (ref 70–99)
GLUCOSE BLDC GLUCOMTR-MCNC: 231 MG/DL — HIGH (ref 70–99)
GLUCOSE BLDC GLUCOMTR-MCNC: 93 MG/DL — SIGNIFICANT CHANGE UP (ref 70–99)
GLUCOSE SERPL-MCNC: 97 MG/DL — SIGNIFICANT CHANGE UP (ref 70–99)
HCT VFR BLD CALC: 37.1 % — SIGNIFICANT CHANGE UP (ref 34.5–45)
HGB BLD-MCNC: 10.6 G/DL — LOW (ref 11.5–15.5)
MCHC RBC-ENTMCNC: 26.9 PG — LOW (ref 27–34)
MCHC RBC-ENTMCNC: 28.6 G/DL — LOW (ref 32–36)
MCV RBC AUTO: 94.2 FL — SIGNIFICANT CHANGE UP (ref 80–100)
NRBC # BLD AUTO: 0 K/UL — SIGNIFICANT CHANGE UP (ref 0–0)
NRBC # FLD: 0 K/UL — SIGNIFICANT CHANGE UP (ref 0–0)
NRBC BLD AUTO-RTO: 0 /100 WBCS — SIGNIFICANT CHANGE UP (ref 0–0)
PLATELET # BLD AUTO: 331 K/UL — SIGNIFICANT CHANGE UP (ref 150–400)
PMV BLD: 10.6 FL — SIGNIFICANT CHANGE UP (ref 7–13)
POTASSIUM SERPL-MCNC: 4.6 MMOL/L — SIGNIFICANT CHANGE UP (ref 3.5–5.3)
POTASSIUM SERPL-SCNC: 4.6 MMOL/L — SIGNIFICANT CHANGE UP (ref 3.5–5.3)
RBC # BLD: 3.94 M/UL — SIGNIFICANT CHANGE UP (ref 3.8–5.2)
RBC # FLD: 15.3 % — HIGH (ref 10.3–14.5)
SODIUM SERPL-SCNC: 133 MMOL/L — LOW (ref 135–145)
WBC # BLD: 5.56 K/UL — SIGNIFICANT CHANGE UP (ref 3.8–10.5)
WBC # FLD AUTO: 5.56 K/UL — SIGNIFICANT CHANGE UP (ref 3.8–10.5)

## 2025-02-23 PROCEDURE — 99232 SBSQ HOSP IP/OBS MODERATE 35: CPT

## 2025-02-23 PROCEDURE — 99233 SBSQ HOSP IP/OBS HIGH 50: CPT

## 2025-02-23 RX ADMIN — INSULIN LISPRO 1: 100 INJECTION, SOLUTION INTRAVENOUS; SUBCUTANEOUS at 13:02

## 2025-02-23 RX ADMIN — HEPARIN SODIUM 5000 UNIT(S): 1000 INJECTION INTRAVENOUS; SUBCUTANEOUS at 17:38

## 2025-02-23 RX ADMIN — Medication 4 MILLIGRAM(S): at 10:20

## 2025-02-23 RX ADMIN — FOLIC ACID 1 MILLIGRAM(S): 1 TABLET ORAL at 12:57

## 2025-02-23 RX ADMIN — GABAPENTIN 300 MILLIGRAM(S): 400 CAPSULE ORAL at 10:20

## 2025-02-23 RX ADMIN — LABETALOL HYDROCHLORIDE 200 MILLIGRAM(S): 200 TABLET, FILM COATED ORAL at 17:38

## 2025-02-23 RX ADMIN — HEPARIN SODIUM 5000 UNIT(S): 1000 INJECTION INTRAVENOUS; SUBCUTANEOUS at 01:34

## 2025-02-23 RX ADMIN — INSULIN LISPRO 2: 100 INJECTION, SOLUTION INTRAVENOUS; SUBCUTANEOUS at 17:38

## 2025-02-23 RX ADMIN — Medication 2 TABLET(S): at 21:42

## 2025-02-23 RX ADMIN — GABAPENTIN 300 MILLIGRAM(S): 400 CAPSULE ORAL at 17:38

## 2025-02-23 RX ADMIN — LABETALOL HYDROCHLORIDE 200 MILLIGRAM(S): 200 TABLET, FILM COATED ORAL at 05:12

## 2025-02-23 RX ADMIN — Medication 1 APPLICATION(S): at 12:57

## 2025-02-23 RX ADMIN — HEPARIN SODIUM 5000 UNIT(S): 1000 INJECTION INTRAVENOUS; SUBCUTANEOUS at 10:20

## 2025-02-23 RX ADMIN — ATORVASTATIN CALCIUM 20 MILLIGRAM(S): 80 TABLET, FILM COATED ORAL at 21:42

## 2025-02-23 RX ADMIN — MONTELUKAST SODIUM 10 MILLIGRAM(S): 10 TABLET ORAL at 12:57

## 2025-02-23 RX ADMIN — Medication 50 MILLIGRAM(S): at 21:42

## 2025-02-23 RX ADMIN — Medication 50 MILLIGRAM(S): at 05:12

## 2025-02-23 RX ADMIN — Medication 4 MILLIGRAM(S): at 11:19

## 2025-02-23 NOTE — PROGRESS NOTE ADULT - SUBJECTIVE AND OBJECTIVE BOX
Patient doing well. Pain controlled. No side effects with current regimen.     Vital Signs Last 24 Hrs  T(C): 36.7 (23 Feb 2025 09:52), Max: 36.7 (22 Feb 2025 16:10)  T(F): 98 (23 Feb 2025 09:52), Max: 98 (22 Feb 2025 16:10)  HR: 78 (23 Feb 2025 09:52) (71 - 80)  BP: 92/60 (23 Feb 2025 09:52) (92/60 - 165/64)  BP(mean): --  RR: 18 (23 Feb 2025 09:52) (17 - 18)  SpO2: 97% (23 Feb 2025 09:52) (94% - 100%)    Parameters below as of 23 Feb 2025 09:52  Patient On (Oxygen Delivery Method): room air                            10.6   5.56  )-----------( 331      ( 23 Feb 2025 05:18 )             37.1   02-23    133[L]  |  94[L]  |  31.8[H]  ----------------------------<  97  4.6   |  26.0  |  5.91[H]    Ca    9.1      23 Feb 2025 05:18    MEDICATIONS  (STANDING):  atorvastatin 20 milliGRAM(s) Oral at bedtime  chlorhexidine 2% Cloths 1 Application(s) Topical daily  dextrose 5%. 1000 milliLiter(s) (50 mL/Hr) IV Continuous <Continuous>  dextrose 5%. 1000 milliLiter(s) (100 mL/Hr) IV Continuous <Continuous>  dextrose 50% Injectable 25 Gram(s) IV Push once  dextrose 50% Injectable 12.5 Gram(s) IV Push once  dextrose 50% Injectable 25 Gram(s) IV Push once  epoetin dereck-epbx (RETACRIT) Injectable 34020 Unit(s) IV Push <User Schedule>  folic acid 1 milliGRAM(s) Oral daily  gabapentin 300 milliGRAM(s) Oral three times a day  glucagon  Injectable 1 milliGRAM(s) IntraMuscular once  heparin   Injectable 5000 Unit(s) SubCutaneous every 8 hours  hydrALAZINE 50 milliGRAM(s) Oral three times a day  insulin lispro (ADMELOG) corrective regimen sliding scale   SubCutaneous three times a day before meals  insulin lispro (ADMELOG) corrective regimen sliding scale   SubCutaneous at bedtime  labetalol 200 milliGRAM(s) Oral two times a day  montelukast 10 milliGRAM(s) Oral daily  senna 2 Tablet(s) Oral at bedtime    MEDICATIONS  (PRN):  acetaminophen     Tablet .. 650 milliGRAM(s) Oral every 6 hours PRN Temp greater or equal to 38C (100.4F), Mild Pain (1 - 3)  albuterol    90 MICROgram(s) HFA Inhaler 2 Puff(s) Inhalation every 6 hours PRN for shortness of breath and/or wheezing  aluminum hydroxide/magnesium hydroxide/simethicone Suspension 30 milliLiter(s) Oral every 4 hours PRN Dyspepsia  dextrose Oral Gel 15 Gram(s) Oral once PRN Blood Glucose LESS THAN 70 milliGRAM(s)/deciliter  HYDROmorphone   Tablet 2 milliGRAM(s) Oral every 6 hours PRN Moderate Pain (4 - 6)  HYDROmorphone   Tablet 4 milliGRAM(s) Oral every 6 hours PRN Severe Pain (7 - 10)  HYDROmorphone  Injectable 0.5 milliGRAM(s) IV Push every 4 hours PRN Breakthrough pain  melatonin 3 milliGRAM(s) Oral at bedtime PRN Insomnia  naloxone Injectable 1 milliGRAM(s) IV Push once PRN For Opioid OD  ondansetron Injectable 4 milliGRAM(s) IV Push every 8 hours PRN Nausea and/or Vomiting       Patient doing well. Pain controlled. No side effects with current regimen.     Vital Signs Last 24 Hrs  T(C): 36.7 (23 Feb 2025 09:52), Max: 36.7 (22 Feb 2025 16:10)  T(F): 98 (23 Feb 2025 09:52), Max: 98 (22 Feb 2025 16:10)  HR: 78 (23 Feb 2025 09:52) (71 - 80)  BP: 92/60 (23 Feb 2025 09:52) (92/60 - 165/64)  BP(mean): --  RR: 18 (23 Feb 2025 09:52) (17 - 18)  SpO2: 97% (23 Feb 2025 09:52) (94% - 100%)    Parameters below as of 23 Feb 2025 09:52  Patient On (Oxygen Delivery Method): room air    PHYSICAL EXAM:      Constitutional: AAOx3    Back: tenderness    Respiratory: CTA    Cardiovascular: s1,s2    Gastrointestinal: +B    Neurological: intact                                  10.6   5.56  )-----------( 331      ( 23 Feb 2025 05:18 )             37.1   02-23    133[L]  |  94[L]  |  31.8[H]  ----------------------------<  97  4.6   |  26.0  |  5.91[H]    Ca    9.1      23 Feb 2025 05:18    MEDICATIONS  (STANDING):  atorvastatin 20 milliGRAM(s) Oral at bedtime  chlorhexidine 2% Cloths 1 Application(s) Topical daily  dextrose 5%. 1000 milliLiter(s) (50 mL/Hr) IV Continuous <Continuous>  dextrose 5%. 1000 milliLiter(s) (100 mL/Hr) IV Continuous <Continuous>  dextrose 50% Injectable 25 Gram(s) IV Push once  dextrose 50% Injectable 12.5 Gram(s) IV Push once  dextrose 50% Injectable 25 Gram(s) IV Push once  epoetin dereck-epbx (RETACRIT) Injectable 32995 Unit(s) IV Push <User Schedule>  folic acid 1 milliGRAM(s) Oral daily  gabapentin 300 milliGRAM(s) Oral three times a day  glucagon  Injectable 1 milliGRAM(s) IntraMuscular once  heparin   Injectable 5000 Unit(s) SubCutaneous every 8 hours  hydrALAZINE 50 milliGRAM(s) Oral three times a day  insulin lispro (ADMELOG) corrective regimen sliding scale   SubCutaneous three times a day before meals  insulin lispro (ADMELOG) corrective regimen sliding scale   SubCutaneous at bedtime  labetalol 200 milliGRAM(s) Oral two times a day  montelukast 10 milliGRAM(s) Oral daily  senna 2 Tablet(s) Oral at bedtime    MEDICATIONS  (PRN):  acetaminophen     Tablet .. 650 milliGRAM(s) Oral every 6 hours PRN Temp greater or equal to 38C (100.4F), Mild Pain (1 - 3)  albuterol    90 MICROgram(s) HFA Inhaler 2 Puff(s) Inhalation every 6 hours PRN for shortness of breath and/or wheezing  aluminum hydroxide/magnesium hydroxide/simethicone Suspension 30 milliLiter(s) Oral every 4 hours PRN Dyspepsia  dextrose Oral Gel 15 Gram(s) Oral once PRN Blood Glucose LESS THAN 70 milliGRAM(s)/deciliter  HYDROmorphone   Tablet 2 milliGRAM(s) Oral every 6 hours PRN Moderate Pain (4 - 6)  HYDROmorphone   Tablet 4 milliGRAM(s) Oral every 6 hours PRN Severe Pain (7 - 10)  HYDROmorphone  Injectable 0.5 milliGRAM(s) IV Push every 4 hours PRN Breakthrough pain  melatonin 3 milliGRAM(s) Oral at bedtime PRN Insomnia  naloxone Injectable 1 milliGRAM(s) IV Push once PRN For Opioid OD  ondansetron Injectable 4 milliGRAM(s) IV Push every 8 hours PRN Nausea and/or Vomiting

## 2025-02-23 NOTE — PROGRESS NOTE ADULT - ASSESSMENT
Osteomyelitis/discitis, Low back pain    1)Patient stable from a pain management perspective.    2)Continue regimen.  3)Sign off

## 2025-02-23 NOTE — PROGRESS NOTE ADULT - ASSESSMENT
70 y/o female with PMH of HTN, HLD, ESRD on HD (M/W/F), DM-2 came to the ED complaining of low back pain.Found to have Discitis/OM of L4-5 on mri spine,ct lumbar spine.Per Ortho - Offered surgery but patient wants conservative management   IR consulted for possible biopsy - biopsy  by IR, asa/plavix needs to hold for 5 days.    #Acute on chronic low back pain   #Discitis/OM of L4-5  CT lumbar degenerative disc changes at L4-L5 concerning for discitis/OM   MRI with OM/Discitis of L4/5  hold antibiotic for now per ID  Blood cx neg    Pain management following   Per Ortho - Offered surgery but patient wants conservative management   IR consulted for possible biopsy - biopsy Tuesday by IR, asa/plavix on hold for 5 days    #Right knee pain   XR knee: lateral prosthetic patellar dislocation  Seen by ortho  Pain control   PT eval     #ESRD   On HD (M/W/F)   Nephrology following     #HTN/HLD/CAD   Aspirin /plavix 75mg  HELD FOR BIOPSY  Simvastatin 40mg   Labetalol 200mg bid   Hydralazine 50mg       #Hyperglycemia with DM-2   Patient not on medication   HbA1C: 5.5 (07/24/23)   Insulin sliding scale   A1C of 6.9      DVT prophylaxis: Heparin sc      Dispo: Pending IR biopsy to r/o Discitis/OM(tuesday)  [facility] pt

## 2025-02-23 NOTE — PROGRESS NOTE ADULT - ASSESSMENT
70 yo female with history of HTN, HLD, ESRD on HD (M/W/F), DM-2 came to ED complaints of lower back pain and lower extremity weakness. Her CT of L-spine showed destructive process centered at the L4-5 disc space with endplate erosions, sclerosis, and grade 1 anterolisthesis, she was seen by Orthopedics, who recommended to do MRI of the spine with iv contrast to rule out discitis/osteomyelitis of the L-spine.   Nephrology was consulted for HD needs.     - ESRD on HD at [facility] unit MWF  HD access RUE AVG  Fluid status euvolemic  - HTN BP is uncontrolled, could be due to pain   - Anemia of CKD - Hb is below goal   - CKD MDB Hyperphosphatemia   - Secondary hyperparathyroidism   - Lower back pain, with destructive lesions in her spine, rule out spinal metastasis    Plan   No need of HD today   HD tomorrow 3 hrs  ml/min 3 K bath Goal UF 1-2L as tolerated by BP  Dose medications for HD   Aranesp 40 mg weekly with HD   Rest of management as per primary team   Discussed with primary team  Will follow

## 2025-02-23 NOTE — PROGRESS NOTE ADULT - SUBJECTIVE AND OBJECTIVE BOX
[id number]  EULOGIO NIELSEN    Patient seen and eval at bedside. Patient c/o back pain and right thigh pain. Patient denies incontinence, saddle anesthesia, difficulty breathing, swallowing. Denies CP, SOB, dizziness. Denies motor/sensory changes.    T(C): 36.7 (02-23-25 @ 09:52), Max: 36.7 (02-22-25 @ 10:40)  HR: 78 (02-23-25 @ 09:52) (71 - 91)  BP: 92/60 (02-23-25 @ 09:52) (92/60 - 165/64)  RR: 18 (02-23-25 @ 09:52) (17 - 18)  SpO2: 97% (02-23-25 @ 09:52) (94% - 100%)    PE: NAD, alert awake  Back: Skin intact, no obvious swelling, open wounds or s/i infx  Motor exam:           Lower extremeity                             HF         KE          TA       EHL         GS                                                 R        2/5        3/5       4/5       5/5         5/5                                               L         4/5        4/5       5/5       5/5          5/5    SILT L2-S1 intact B/L, DP pulses 2+ B/L  Calf soft, NT B/L  Right thigh soft compressible, no significant swelling noted      A/P: 70 yo with ? discitis L4-5  ·	IR aspiration of L spine tomorrow  ·	Cont IV abx as per ID  ·	PT -WBAT  ·	DVT propx  ·	Will follow [id number]  EULOGIO NIELSEN    Patient seen and eval at bedside. Patient c/o back pain and right thigh pain. Patient states the pain medication she is currently taking is not enough. Patient denies incontinence, saddle anesthesia, difficulty breathing, swallowing. Denies CP, SOB, dizziness. Denies motor/sensory changes.    T(C): 36.7 (02-23-25 @ 09:52), Max: 36.7 (02-22-25 @ 10:40)  HR: 78 (02-23-25 @ 09:52) (71 - 91)  BP: 92/60 (02-23-25 @ 09:52) (92/60 - 165/64)  RR: 18 (02-23-25 @ 09:52) (17 - 18)  SpO2: 97% (02-23-25 @ 09:52) (94% - 100%)    PE: NAD, alert awake  Back: Skin intact, no obvious swelling, open wounds or s/i infx  Motor exam:           Lower extremeity                             HF         KE          TA       EHL         GS                                                 R        2/5        3/5       4/5       5/5         5/5                                               L         4/5        4/5       5/5       5/5          5/5    SILT L2-S1 intact B/L, DP pulses 2+ B/L  Calf soft, NT B/L  Right thigh soft compressible, no significant swelling noted      A/P: 68 yo with ? discitis L4-5  ·	IR aspiration of L spine tomorrow  ·	Pain control - d/w primary team regarding insufficient pain regimen. Consider pain management consultation.  ·	Cont IV abx as per ID  ·	PT -WBAT  ·	DVT propx  ·	Will follow

## 2025-02-23 NOTE — PROGRESS NOTE ADULT - SUBJECTIVE AND OBJECTIVE BOX
Reason for visit: ESRD    Subjective: No acute overnight event. Patient denied any cardiac or urinary complains. No fever/chills.   reports pain controlled    ROS: All systems were reviewed in detail pertinent positive and negative mentioned above, rest are negative.    Physical Exam:  Gen: no acute distress  MS: alert, conversing normally  Eyes: EOMI, no icterus  HENT: NCAT, MMM  CV: rhythm reg reg, rate normal, no m/g/r  Chest: CTAB, no w/r/r,  Abd: soft, NT, ND  Extremities: No edema    Vital Signs Last 24 Hrs  T(C): 36.7 (23 Feb 2025 09:52), Max: 36.7 (22 Feb 2025 16:10)  T(F): 98 (23 Feb 2025 09:52), Max: 98 (22 Feb 2025 16:10)  HR: 78 (23 Feb 2025 09:52) (71 - 80)  BP: 92/60 (23 Feb 2025 09:52) (92/60 - 165/64)  BP(mean): --  RR: 18 (23 Feb 2025 09:52) (17 - 18)  SpO2: 97% (23 Feb 2025 09:52) (94% - 100%)    Parameters below as of 23 Feb 2025 09:52  Patient On (Oxygen Delivery Method): room air      I&O's Summary    22 Feb 2025 07:01  -  23 Feb 2025 07:00  --------------------------------------------------------  IN: 300 mL / OUT: 0 mL / NET: 300 mL      Current Antibiotics:    Other medications:  atorvastatin 20 milliGRAM(s) Oral at bedtime  chlorhexidine 2% Cloths 1 Application(s) Topical daily  dextrose 5%. 1000 milliLiter(s) IV Continuous <Continuous>  dextrose 5%. 1000 milliLiter(s) IV Continuous <Continuous>  dextrose 50% Injectable 25 Gram(s) IV Push once  dextrose 50% Injectable 12.5 Gram(s) IV Push once  dextrose 50% Injectable 25 Gram(s) IV Push once  epoetin dereck-epbx (RETACRIT) Injectable 23942 Unit(s) IV Push <User Schedule>  folic acid 1 milliGRAM(s) Oral daily  gabapentin 300 milliGRAM(s) Oral three times a day  glucagon  Injectable 1 milliGRAM(s) IntraMuscular once  heparin   Injectable 5000 Unit(s) SubCutaneous every 8 hours  hydrALAZINE 50 milliGRAM(s) Oral three times a day  insulin lispro (ADMELOG) corrective regimen sliding scale   SubCutaneous three times a day before meals  insulin lispro (ADMELOG) corrective regimen sliding scale   SubCutaneous at bedtime  labetalol 200 milliGRAM(s) Oral two times a day  montelukast 10 milliGRAM(s) Oral daily  senna 2 Tablet(s) Oral at bedtime    02-23    133[L]  |  94[L]  |  31.8[H]  ----------------------------<  97  4.6   |  26.0  |  5.91[H]    Ca    9.1      23 Feb 2025 05:18      Creatinine: 5.91 mg/dL (02-23-25 @ 05:18)  Creatinine: 5.79 mg/dL (02-21-25 @ 05:28)  Creatinine: 4.33 mg/dL (02-20-25 @ 09:25)  Creatinine: 3.91 mg/dL (02-20-25 @ 06:16)  Creatinine: 3.77 mg/dL (02-19-25 @ 06:08)                          10.6   5.56  )-----------( 331      ( 23 Feb 2025 05:18 )             37.1

## 2025-02-23 NOTE — PROGRESS NOTE ADULT - SUBJECTIVE AND OBJECTIVE BOX
Patient is a 69y old  Female who presents with a chief complaint of Pain management (21 Feb 2025 12:11)      c/o intermittent rt knee pain. denies acute back pain now  REVIEW OF SYSTEMS: All systems are reviewed and found to be negative except above    MEDICATIONS  (STANDING):  atorvastatin 20 milliGRAM(s) Oral at bedtime  chlorhexidine 2% Cloths 1 Application(s) Topical daily  dextrose 5%. 1000 milliLiter(s) (50 mL/Hr) IV Continuous <Continuous>  dextrose 5%. 1000 milliLiter(s) (100 mL/Hr) IV Continuous <Continuous>  dextrose 50% Injectable 25 Gram(s) IV Push once  dextrose 50% Injectable 12.5 Gram(s) IV Push once  dextrose 50% Injectable 25 Gram(s) IV Push once  epoetin dereck-epbx (RETACRIT) Injectable 37547 Unit(s) IV Push <User Schedule>  folic acid 1 milliGRAM(s) Oral daily  gabapentin 300 milliGRAM(s) Oral three times a day  glucagon  Injectable 1 milliGRAM(s) IntraMuscular once  heparin   Injectable 5000 Unit(s) SubCutaneous every 8 hours  hydrALAZINE 50 milliGRAM(s) Oral three times a day  insulin lispro (ADMELOG) corrective regimen sliding scale   SubCutaneous three times a day before meals  insulin lispro (ADMELOG) corrective regimen sliding scale   SubCutaneous at bedtime  labetalol 200 milliGRAM(s) Oral two times a day  montelukast 10 milliGRAM(s) Oral daily  senna 2 Tablet(s) Oral at bedtime    MEDICATIONS  (PRN):  acetaminophen     Tablet .. 650 milliGRAM(s) Oral every 6 hours PRN Temp greater or equal to 38C (100.4F), Mild Pain (1 - 3)  albuterol    90 MICROgram(s) HFA Inhaler 2 Puff(s) Inhalation every 6 hours PRN for shortness of breath and/or wheezing  aluminum hydroxide/magnesium hydroxide/simethicone Suspension 30 milliLiter(s) Oral every 4 hours PRN Dyspepsia  dextrose Oral Gel 15 Gram(s) Oral once PRN Blood Glucose LESS THAN 70 milliGRAM(s)/deciliter  HYDROmorphone   Tablet 2 milliGRAM(s) Oral every 6 hours PRN Moderate Pain (4 - 6)  HYDROmorphone   Tablet 4 milliGRAM(s) Oral every 6 hours PRN Severe Pain (7 - 10)  HYDROmorphone  Injectable 0.5 milliGRAM(s) IV Push every 4 hours PRN Breakthrough pain  melatonin 3 milliGRAM(s) Oral at bedtime PRN Insomnia  naloxone Injectable 1 milliGRAM(s) IV Push once PRN For Opioid OD  ondansetron Injectable 4 milliGRAM(s) IV Push every 8 hours PRN Nausea and/or Vomiting      CAPILLARY BLOOD GLUCOSE      POCT Blood Glucose.: 161 mg/dL (23 Feb 2025 12:56)  POCT Blood Glucose.: 93 mg/dL (23 Feb 2025 08:12)  POCT Blood Glucose.: 207 mg/dL (22 Feb 2025 21:09)  POCT Blood Glucose.: 138 mg/dL (22 Feb 2025 18:02)    I&O's Summary    22 Feb 2025 07:01  -  23 Feb 2025 07:00  --------------------------------------------------------  IN: 300 mL / OUT: 0 mL / NET: 300 mL        PHYSICAL EXAM:  Vital Signs Last 24 Hrs  T(C): 36.7 (23 Feb 2025 09:52), Max: 36.7 (22 Feb 2025 16:10)  T(F): 98 (23 Feb 2025 09:52), Max: 98 (22 Feb 2025 16:10)  HR: 78 (23 Feb 2025 09:52) (71 - 80)  BP: 92/60 (23 Feb 2025 09:52) (92/60 - 165/64)  BP(mean): 86 (22 Feb 2025 14:19) (86 - 86)  RR: 18 (23 Feb 2025 09:52) (17 - 18)  SpO2: 97% (23 Feb 2025 09:52) (94% - 100%)    Parameters below as of 23 Feb 2025 09:52  Patient On (Oxygen Delivery Method): room air        CONSTITUTIONAL: NAD,  EYES: PERRLA; conjunctiva and sclera clear  ENMT: Moist oral mucosa,   RESPIRATORY: Normal respiratory effort; lungs are clear to auscultation bilaterally  CARDIOVASCULAR: Regular rate and rhythm, normal S1 and S2, no murmur   EXTS: No lower extremity edema; Peripheral pulses are 2+ bilaterally  ABDOMEN: Nontender to palpation, normoactive bowel sounds, no rebound/guarding;   MUSCLOSKELETAL:rt knee : no joint swelling, mild  tenderness to palpation. BACK: Mild  TENDER LOWER BACK  PSYCH: coop  NEUROLOGY: A+O to person, place, and time; CN 2-12 are intact and symmetric; no gross sensory deficits;       LABS:                        10.6   5.56  )-----------( 331      ( 23 Feb 2025 05:18 )             37.1     02-23    133[L]  |  94[L]  |  31.8[H]  ----------------------------<  97  4.6   |  26.0  |  5.91[H]    Ca    9.1      23 Feb 2025 05:18            Urinalysis Basic - ( 23 Feb 2025 05:18 )    Color: x / Appearance: x / SG: x / pH: x  Gluc: 97 mg/dL / Ketone: x  / Bili: x / Urobili: x   Blood: x / Protein: x / Nitrite: x   Leuk Esterase: x / RBC: x / WBC x   Sq Epi: x / Non Sq Epi: x / Bacteria: x          RADIOLOGY & ADDITIONAL TESTS:  Results Reviewed:

## 2025-02-24 LAB
ANION GAP SERPL CALC-SCNC: 13 MMOL/L — SIGNIFICANT CHANGE UP (ref 5–17)
APTT BLD: 28.9 SEC — SIGNIFICANT CHANGE UP (ref 24.5–35.6)
BUN SERPL-MCNC: 45.6 MG/DL — HIGH (ref 8–20)
CALCIUM SERPL-MCNC: 9.6 MG/DL — SIGNIFICANT CHANGE UP (ref 8.4–10.5)
CHLORIDE SERPL-SCNC: 95 MMOL/L — LOW (ref 96–108)
CO2 SERPL-SCNC: 27 MMOL/L — SIGNIFICANT CHANGE UP (ref 22–29)
CREAT SERPL-MCNC: 7.76 MG/DL — HIGH (ref 0.5–1.3)
CULTURE RESULTS: SIGNIFICANT CHANGE UP
CULTURE RESULTS: SIGNIFICANT CHANGE UP
EGFR: 5 ML/MIN/1.73M2 — LOW
EGFR: 5 ML/MIN/1.73M2 — LOW
GLUCOSE BLDC GLUCOMTR-MCNC: 110 MG/DL — HIGH (ref 70–99)
GLUCOSE BLDC GLUCOMTR-MCNC: 245 MG/DL — HIGH (ref 70–99)
GLUCOSE BLDC GLUCOMTR-MCNC: 78 MG/DL — SIGNIFICANT CHANGE UP (ref 70–99)
GLUCOSE BLDC GLUCOMTR-MCNC: 98 MG/DL — SIGNIFICANT CHANGE UP (ref 70–99)
GLUCOSE SERPL-MCNC: 109 MG/DL — HIGH (ref 70–99)
HCT VFR BLD CALC: 35 % — SIGNIFICANT CHANGE UP (ref 34.5–45)
HGB BLD-MCNC: 10.2 G/DL — LOW (ref 11.5–15.5)
INR BLD: 0.94 RATIO — SIGNIFICANT CHANGE UP (ref 0.85–1.16)
MCHC RBC-ENTMCNC: 27.2 PG — SIGNIFICANT CHANGE UP (ref 27–34)
MCHC RBC-ENTMCNC: 29.1 G/DL — LOW (ref 32–36)
MCV RBC AUTO: 93.3 FL — SIGNIFICANT CHANGE UP (ref 80–100)
NRBC # BLD AUTO: 0 K/UL — SIGNIFICANT CHANGE UP (ref 0–0)
NRBC # FLD: 0 K/UL — SIGNIFICANT CHANGE UP (ref 0–0)
NRBC BLD AUTO-RTO: 0 /100 WBCS — SIGNIFICANT CHANGE UP (ref 0–0)
PLATELET # BLD AUTO: 355 K/UL — SIGNIFICANT CHANGE UP (ref 150–400)
PMV BLD: 10.3 FL — SIGNIFICANT CHANGE UP (ref 7–13)
POTASSIUM SERPL-MCNC: 5.3 MMOL/L — SIGNIFICANT CHANGE UP (ref 3.5–5.3)
POTASSIUM SERPL-SCNC: 5.3 MMOL/L — SIGNIFICANT CHANGE UP (ref 3.5–5.3)
PROTHROM AB SERPL-ACNC: 10.6 SEC — SIGNIFICANT CHANGE UP (ref 9.9–13.4)
RBC # BLD: 3.75 M/UL — LOW (ref 3.8–5.2)
RBC # FLD: 15.7 % — HIGH (ref 10.3–14.5)
SODIUM SERPL-SCNC: 135 MMOL/L — SIGNIFICANT CHANGE UP (ref 135–145)
SPECIMEN SOURCE: SIGNIFICANT CHANGE UP
SPECIMEN SOURCE: SIGNIFICANT CHANGE UP
WBC # BLD: 6.81 K/UL — SIGNIFICANT CHANGE UP (ref 3.8–10.5)
WBC # FLD AUTO: 6.81 K/UL — SIGNIFICANT CHANGE UP (ref 3.8–10.5)

## 2025-02-24 PROCEDURE — 99232 SBSQ HOSP IP/OBS MODERATE 35: CPT

## 2025-02-24 PROCEDURE — 90935 HEMODIALYSIS ONE EVALUATION: CPT

## 2025-02-24 PROCEDURE — 99233 SBSQ HOSP IP/OBS HIGH 50: CPT

## 2025-02-24 PROCEDURE — G0545: CPT

## 2025-02-24 RX ORDER — HEPARIN SODIUM 1000 [USP'U]/ML
5000 INJECTION INTRAVENOUS; SUBCUTANEOUS ONCE
Refills: 0 | Status: COMPLETED | OUTPATIENT
Start: 2025-02-24 | End: 2025-02-24

## 2025-02-24 RX ORDER — HYDROMORPHONE/SOD CHLOR,ISO/PF 2 MG/10 ML
4 SYRINGE (ML) INJECTION ONCE
Refills: 0 | Status: DISCONTINUED | OUTPATIENT
Start: 2025-02-24 | End: 2025-02-24

## 2025-02-24 RX ADMIN — GABAPENTIN 300 MILLIGRAM(S): 400 CAPSULE ORAL at 14:03

## 2025-02-24 RX ADMIN — Medication 4 MILLIGRAM(S): at 09:56

## 2025-02-24 RX ADMIN — Medication 4 MILLIGRAM(S): at 05:22

## 2025-02-24 RX ADMIN — GABAPENTIN 300 MILLIGRAM(S): 400 CAPSULE ORAL at 22:15

## 2025-02-24 RX ADMIN — HEPARIN SODIUM 5000 UNIT(S): 1000 INJECTION INTRAVENOUS; SUBCUTANEOUS at 14:15

## 2025-02-24 RX ADMIN — ATORVASTATIN CALCIUM 20 MILLIGRAM(S): 80 TABLET, FILM COATED ORAL at 22:15

## 2025-02-24 RX ADMIN — Medication 4 MILLIGRAM(S): at 17:31

## 2025-02-24 RX ADMIN — Medication 2 TABLET(S): at 22:15

## 2025-02-24 RX ADMIN — Medication 0.5 MILLIGRAM(S): at 08:25

## 2025-02-24 RX ADMIN — HEPARIN SODIUM 5000 UNIT(S): 1000 INJECTION INTRAVENOUS; SUBCUTANEOUS at 02:53

## 2025-02-24 RX ADMIN — GABAPENTIN 300 MILLIGRAM(S): 400 CAPSULE ORAL at 02:53

## 2025-02-24 RX ADMIN — LABETALOL HYDROCHLORIDE 200 MILLIGRAM(S): 200 TABLET, FILM COATED ORAL at 17:26

## 2025-02-24 RX ADMIN — MONTELUKAST SODIUM 10 MILLIGRAM(S): 10 TABLET ORAL at 14:03

## 2025-02-24 RX ADMIN — Medication 4 MILLIGRAM(S): at 04:22

## 2025-02-24 RX ADMIN — LABETALOL HYDROCHLORIDE 200 MILLIGRAM(S): 200 TABLET, FILM COATED ORAL at 05:27

## 2025-02-24 RX ADMIN — Medication 0.5 MILLIGRAM(S): at 09:00

## 2025-02-24 RX ADMIN — Medication 1 APPLICATION(S): at 14:03

## 2025-02-24 RX ADMIN — EPOETIN ALFA 10000 UNIT(S): 10000 SOLUTION INTRAVENOUS; SUBCUTANEOUS at 11:00

## 2025-02-24 RX ADMIN — Medication 4 MILLIGRAM(S): at 09:26

## 2025-02-24 RX ADMIN — Medication 4 MILLIGRAM(S): at 18:06

## 2025-02-24 RX ADMIN — Medication 50 MILLIGRAM(S): at 14:38

## 2025-02-24 NOTE — PROGRESS NOTE ADULT - SUBJECTIVE AND OBJECTIVE BOX
EULOGIO NIELSEN    [id number]    History: 69y Female with L4-5 discitis. The patient states her pain is slightly improved from admission. The patient's pain is controlled using the prescribed pain medications. The continues to complain of lower back pain and RLE pain radiating into her R thigh. Denies nausea, vomiting, chest pain, shortness of breath, abdominal pain or fever. No new complaints.    Vital Signs Last 24 Hrs  T(C): 36.9 (24 Feb 2025 04:20), Max: 36.9 (24 Feb 2025 04:20)  T(F): 98.4 (24 Feb 2025 04:20), Max: 98.4 (24 Feb 2025 04:20)  HR: 74 (24 Feb 2025 04:20) (73 - 79)  BP: 115/62 (24 Feb 2025 04:20) (92/60 - 133/56)  BP(mean): --  RR: 17 (24 Feb 2025 04:20) (17 - 18)  SpO2: 98% (24 Feb 2025 04:20) (95% - 98%)    Parameters below as of 24 Feb 2025 04:20  Patient On (Oxygen Delivery Method): room air                              10.2   6.81  )-----------( 355      ( 24 Feb 2025 06:59 )             35.0     02-24    135  |  95[L]  |  45.6[H]  ----------------------------<  109[H]  5.3   |  27.0  |  7.76[H]    Ca    9.6      24 Feb 2025 06:59        MEDICATIONS  (STANDING):  atorvastatin 20 milliGRAM(s) Oral at bedtime  chlorhexidine 2% Cloths 1 Application(s) Topical daily  dextrose 5%. 1000 milliLiter(s) (100 mL/Hr) IV Continuous <Continuous>  dextrose 5%. 1000 milliLiter(s) (50 mL/Hr) IV Continuous <Continuous>  dextrose 50% Injectable 25 Gram(s) IV Push once  dextrose 50% Injectable 12.5 Gram(s) IV Push once  dextrose 50% Injectable 25 Gram(s) IV Push once  epoetin dereck-epbx (RETACRIT) Injectable 61747 Unit(s) IV Push <User Schedule>  folic acid 1 milliGRAM(s) Oral daily  gabapentin 300 milliGRAM(s) Oral three times a day  glucagon  Injectable 1 milliGRAM(s) IntraMuscular once  heparin   Injectable 5000 Unit(s) SubCutaneous every 8 hours  hydrALAZINE 50 milliGRAM(s) Oral three times a day  insulin lispro (ADMELOG) corrective regimen sliding scale   SubCutaneous three times a day before meals  insulin lispro (ADMELOG) corrective regimen sliding scale   SubCutaneous at bedtime  labetalol 200 milliGRAM(s) Oral two times a day  montelukast 10 milliGRAM(s) Oral daily  senna 2 Tablet(s) Oral at bedtime    MEDICATIONS  (PRN):  acetaminophen     Tablet .. 650 milliGRAM(s) Oral every 6 hours PRN Temp greater or equal to 38C (100.4F), Mild Pain (1 - 3)  albuterol    90 MICROgram(s) HFA Inhaler 2 Puff(s) Inhalation every 6 hours PRN for shortness of breath and/or wheezing  aluminum hydroxide/magnesium hydroxide/simethicone Suspension 30 milliLiter(s) Oral every 4 hours PRN Dyspepsia  dextrose Oral Gel 15 Gram(s) Oral once PRN Blood Glucose LESS THAN 70 milliGRAM(s)/deciliter  HYDROmorphone   Tablet 2 milliGRAM(s) Oral every 6 hours PRN Moderate Pain (4 - 6)  HYDROmorphone   Tablet 4 milliGRAM(s) Oral every 6 hours PRN Severe Pain (7 - 10)  HYDROmorphone  Injectable 0.5 milliGRAM(s) IV Push every 4 hours PRN Breakthrough pain  melatonin 3 milliGRAM(s) Oral at bedtime PRN Insomnia  naloxone Injectable 1 milliGRAM(s) IV Push once PRN For Opioid OD  ondansetron Injectable 4 milliGRAM(s) IV Push every 8 hours PRN Nausea and/or Vomiting      Vital Signs Last 24 Hrs  T(C): 36.9 (24 Feb 2025 04:20), Max: 36.9 (24 Feb 2025 04:20)  T(F): 98.4 (24 Feb 2025 04:20), Max: 98.4 (24 Feb 2025 04:20)  HR: 74 (24 Feb 2025 04:20) (73 - 79)  BP: 115/62 (24 Feb 2025 04:20) (92/60 - 133/56)  BP(mean): --  RR: 17 (24 Feb 2025 04:20) (17 - 18)  SpO2: 98% (24 Feb 2025 04:20) (95% - 98%)    Parameters below as of 24 Feb 2025 04:20  Patient On (Oxygen Delivery Method): room air      Daily     Daily     Appearance: Alert, responsive, in no acute distress.    Skin: no rash on visible skin. Skin is clean, dry and intact. No bleeding. No abrasions. No ulcerations.    Musculoskeletal:         Lumbar: Tenderness to palpation lower lumbar paraspinal muscles       Left Lower Extremity: Sensation is grossly intact to light touch. 2+ distal pulses. Cap refill < 2 sec.        Right Lower Extremity: Sensation is grossly intact to light touch. 2+ distal pulses. Cap refill < 2 sec.        Motor exam: [  ]             [ ] Lower extremity                    HF(l2)    KE(l3)   TA(l4)  EHL(l5)    GS(s1)                                                 R        4/5        4/5        5/5       5/5         5/5                                               L         5/5        5/5       5/5       5/5          5/5    Primary Orthopedic Assessment:  • 69y Female with L4-5 discitis     Secondary  Orthopedic Assessment(s):   •     Secondary  Medical Assessment(s):   •     Plan:   • Pain control as clinically indicated  • DVT prophylaxis as prescribed, including use of compression devices and ankle pumps  • Incentive spirometry encouraged  • Abx as per ID, f/u IR aspiration as per ID

## 2025-02-24 NOTE — PROGRESS NOTE ADULT - SUBJECTIVE AND OBJECTIVE BOX
[facility] DIVISION OF KIDNEY DISEASES AND HYPERTENSION -- DIALYSIS NOTE    Patient seen and examined during dialysis  Patient tolerating the procedure well.     VITALS  --------------------------------------------------------------------------------  T(C): 36.4 (02-24-25 @ 08:45), Max: 36.9 (02-24-25 @ 04:20)  HR: 70 (02-24-25 @ 08:45) (70 - 79)  BP: 130/48 (02-24-25 @ 08:45) (92/60 - 133/56)  RR: 18 (02-24-25 @ 08:45) (17 - 18)  SpO2: 95% (02-24-25 @ 08:45) (95% - 100%)  Wt(kg): --     Will continue the current medical management. Next hemodialysis as scheduled.     in pain  BP lower side  would recommend holding hydralazine for now  minimal UF

## 2025-02-24 NOTE — PROGRESS NOTE ADULT - ASSESSMENT
70 y/o female with PMH of HTN, HLD, ESRD on HD (M/W/F), DM-2 came to the ED complaining of low back pain.Found to have Discitis/OM of L4-5 on mri spine,ct lumbar spine.Per Ortho - Offered surgery but patient wants conservative management   IR consulted for possible biopsy - biopsy  by IR, asa/plavix needs to hold for 5 days.    #Acute on chronic low back pain   #Discitis/OM of L4-5  Biopsy Tuesday by IR, asa/plavix on hold for 5 days,NPO FROM MID NIGHT. WILL HOLD PM DOSE SQ HEPARIN  CT lumbar degenerative disc changes at L4-L5 concerning for discitis/OM   MRI with OM/Discitis of L4/5  hold antibiotic for now per ID  Blood cx neg    Pain management following   Per Ortho - Offered surgery but patient wants conservative management       #Right knee pain   XR knee: lateral prosthetic patellar dislocation  Seen by ortho  Pain control . Seen by pain mx  PT eval     #ESRD   On HD (M/W/F)   Nephrology following     #HTN/HLD/CAD   Aspirin /plavix 75mg  HELD FOR BIOPSY  Simvastatin 40mg   Labetalol 200mg bid   Hydralazine 50mg       #Hyperglycemia with DM-2   Patient not on medication   HbA1C: 5.5 (07/24/23)   Insulin sliding scale   A1C of 6.9      DVT prophylaxis: Heparin sc      Dispo: Pending IR biopsy to r/o Discitis/OM(tuesday)  dw pt 70 y/o female with PMH of HTN, HLD, ESRD on HD (M/W/F), DM-2 came to the ED complaining of low back pain.Found to have Discitis/OM of L4-5 on mri spine,ct lumbar spine.Per Ortho - Offered surgery but patient wants conservative management   IR consulted for possible biopsy - biopsy  by IR, asa/plavix needs to hold for 5 days.    #Acute on chronic low back pain   #Discitis/OM of L4-5  Biopsy Tomorrow per IR, asa/plavix on hold for 5 days,NPO FROM MID NIGHT. WILL HOLD PM DOSE SQ HEPARIN  CT lumbar degenerative disc changes at L4-L5 concerning for discitis/OM   MRI with OM/Discitis of L4/5  hold antibiotic for now per ID  Blood cx neg    Pain management following   Per Ortho - Offered surgery but patient wants conservative management       #Right knee pain   XR knee: lateral prosthetic patellar dislocation  Seen by ortho  Pain control . Seen by pain mx  PT eval     #ESRD   On HD (M/W/F)   Nephrology following     #HTN/HLD/CAD   Aspirin /plavix 75mg  HELD FOR BIOPSY  Simvastatin 40mg   Labetalol 200mg bid   Hydralazine 50mg       #Hyperglycemia with DM-2   Patient not on medication   HbA1C: 5.5 (07/24/23)   Insulin sliding scale   A1C of 6.9      DVT prophylaxis: Heparin sc      Dispo: Pending IR biopsy to r/o Discitis/OM(tuesday)  dw pt  dw IR

## 2025-02-24 NOTE — PROGRESS NOTE ADULT - SUBJECTIVE AND OBJECTIVE BOX
Patient is a 69y old  Female who presents with a chief complaint of Low back pain (23 Feb 2025 15:41)      back pain,knee pain stable now  REVIEW OF SYSTEMS: All systems are reviewed and found to be negative except above    MEDICATIONS  (STANDING):  atorvastatin 20 milliGRAM(s) Oral at bedtime  chlorhexidine 2% Cloths 1 Application(s) Topical daily  dextrose 5%. 1000 milliLiter(s) (100 mL/Hr) IV Continuous <Continuous>  dextrose 5%. 1000 milliLiter(s) (50 mL/Hr) IV Continuous <Continuous>  dextrose 50% Injectable 25 Gram(s) IV Push once  dextrose 50% Injectable 12.5 Gram(s) IV Push once  dextrose 50% Injectable 25 Gram(s) IV Push once  epoetin dereck-epbx (RETACRIT) Injectable 78888 Unit(s) IV Push <User Schedule>  folic acid 1 milliGRAM(s) Oral daily  gabapentin 300 milliGRAM(s) Oral three times a day  glucagon  Injectable 1 milliGRAM(s) IntraMuscular once  heparin   Injectable 5000 Unit(s) SubCutaneous once  hydrALAZINE 50 milliGRAM(s) Oral three times a day  insulin lispro (ADMELOG) corrective regimen sliding scale   SubCutaneous three times a day before meals  insulin lispro (ADMELOG) corrective regimen sliding scale   SubCutaneous at bedtime  labetalol 200 milliGRAM(s) Oral two times a day  montelukast 10 milliGRAM(s) Oral daily  senna 2 Tablet(s) Oral at bedtime    MEDICATIONS  (PRN):  acetaminophen     Tablet .. 650 milliGRAM(s) Oral every 6 hours PRN Temp greater or equal to 38C (100.4F), Mild Pain (1 - 3)  albuterol    90 MICROgram(s) HFA Inhaler 2 Puff(s) Inhalation every 6 hours PRN for shortness of breath and/or wheezing  aluminum hydroxide/magnesium hydroxide/simethicone Suspension 30 milliLiter(s) Oral every 4 hours PRN Dyspepsia  dextrose Oral Gel 15 Gram(s) Oral once PRN Blood Glucose LESS THAN 70 milliGRAM(s)/deciliter  HYDROmorphone   Tablet 2 milliGRAM(s) Oral every 6 hours PRN Moderate Pain (4 - 6)  HYDROmorphone   Tablet 4 milliGRAM(s) Oral every 6 hours PRN Severe Pain (7 - 10)  HYDROmorphone  Injectable 0.5 milliGRAM(s) IV Push every 4 hours PRN Breakthrough pain  melatonin 3 milliGRAM(s) Oral at bedtime PRN Insomnia  naloxone Injectable 1 milliGRAM(s) IV Push once PRN For Opioid OD  ondansetron Injectable 4 milliGRAM(s) IV Push every 8 hours PRN Nausea and/or Vomiting      CAPILLARY BLOOD GLUCOSE      POCT Blood Glucose.: 98 mg/dL (24 Feb 2025 08:28)  POCT Blood Glucose.: 163 mg/dL (23 Feb 2025 21:41)  POCT Blood Glucose.: 231 mg/dL (23 Feb 2025 17:37)    I&O's Summary    23 Feb 2025 07:01  -  24 Feb 2025 07:00  --------------------------------------------------------  IN: 425 mL / OUT: 0 mL / NET: 425 mL        PHYSICAL EXAM:  Vital Signs Last 24 Hrs  T(C): 36.4 (24 Feb 2025 13:00), Max: 36.9 (24 Feb 2025 04:20)  T(F): 97.6 (24 Feb 2025 13:00), Max: 98.4 (24 Feb 2025 04:20)  HR: 78 (24 Feb 2025 13:00) (70 - 79)  BP: 118/79 (24 Feb 2025 13:00) (106/46 - 133/56)  BP(mean): 66 (24 Feb 2025 08:15) (66 - 66)  RR: 18 (24 Feb 2025 13:00) (17 - 18)  SpO2: 100% (24 Feb 2025 13:00) (95% - 100%)    Parameters below as of 24 Feb 2025 13:00  Patient On (Oxygen Delivery Method): room air        CONSTITUTIONAL: NAD,  EYES: PERRLA; conjunctiva and sclera clear  ENMT: Moist oral mucosa,   RESPIRATORY: Normal respiratory effort; lungs are clear to auscultation bilaterally  CARDIOVASCULAR: Regular rate and rhythm, normal S1 and S2, no murmur   EXTS: No lower extremity edema; Peripheral pulses are 2+ bilaterally  ABDOMEN: Nontender to palpation, normoactive bowel sounds, no rebound/guarding;   MUSCLOSKELETAL:   rt knee:  no joint swelling, mild  tenderness to palpation  PSYCH: coop  NEUROLOGY: A+O to person, place, and time; CN 2-12 are intact and symmetric; no gross sensory deficits;       LABS:                        10.2   6.81  )-----------( 355      ( 24 Feb 2025 06:59 )             35.0     02-24    135  |  95[L]  |  45.6[H]  ----------------------------<  109[H]  5.3   |  27.0  |  7.76[H]    Ca    9.6      24 Feb 2025 06:59      PT/INR - ( 24 Feb 2025 11:45 )   PT: 10.6 sec;   INR: 0.94 ratio         PTT - ( 24 Feb 2025 11:45 )  PTT:28.9 sec      Urinalysis Basic - ( 24 Feb 2025 06:59 )    Color: x / Appearance: x / SG: x / pH: x  Gluc: 109 mg/dL / Ketone: x  / Bili: x / Urobili: x   Blood: x / Protein: x / Nitrite: x   Leuk Esterase: x / RBC: x / WBC x   Sq Epi: x / Non Sq Epi: x / Bacteria: x          RADIOLOGY & ADDITIONAL TESTS:  Results Reviewed:

## 2025-02-24 NOTE — PROGRESS NOTE ADULT - SUBJECTIVE AND OBJECTIVE BOX
[facility]                                                INFECTIOUS DISEASES  =======================================================                   Pascual Lopez#   Declan Kevin MD#    Jing Ritter MD*                           Natalia Tamayo MD*   Sylvia Mo MD*   Iva Akbar *           Diplomates American Board of Internal Medicine & Infectious Diseases                  # [place] Office - Appt - Tel  [phone] Fax [phone]                * [place] Office - Appt - Tel [phone] Fax [phone]                                  Hospital Consult line:  [phone]  =======================================================      [facility]-[id number]  EULOGIO NIELSEN   follow up for: discitis  c/o back pain  patient seen and examined.       I have personally reviewed the labs and data; pertinent labs and data are listed in this note; please see below.   ===================================================  REVIEW OF SYSTEMS:  CONSTITUTIONAL:  No Fever or chills  HEENT:  No diplopia or blurred vision.  No earache, sore throat or runny nose.  CARDIOVASCULAR:  No pressure, squeezing, strangling, tightness, heaviness or aching about the chest, neck, axilla or epigastrium.  RESPIRATORY:  No cough, shortness of breath  GASTROINTESTINAL:  No nausea, vomiting or diarrhea.  GENITOURINARY:  No dysuria, frequency or urgency. No Blood in urine  MUSCULOSKELETAL:  no joint aches, no muscle pain  SKIN:  No change in skin, hair or nails.  NEUROLOGIC:  No Headaches, seizures or weakness.  PSYCHIATRIC:  No disorder of thought or mood.  ENDOCRINE:  No heat or cold intolerance  HEMATOLOGICAL:  No easy bruising or bleeding.    =======================================================  Allergies    lip swelling with lobster (Swelling)  erythromycin (Vomiting)  shellfish (Swelling; Hives)    Intolerances    Antibiotics:    Other medications:  atorvastatin 20 milliGRAM(s) Oral at bedtime  chlorhexidine 2% Cloths 1 Application(s) Topical daily  dextrose 5%. 1000 milliLiter(s) IV Continuous <Continuous>  dextrose 5%. 1000 milliLiter(s) IV Continuous <Continuous>  dextrose 50% Injectable 25 Gram(s) IV Push once  dextrose 50% Injectable 12.5 Gram(s) IV Push once  dextrose 50% Injectable 25 Gram(s) IV Push once  epoetin dereck-epbx (RETACRIT) Injectable 32849 Unit(s) IV Push <User Schedule>  folic acid 1 milliGRAM(s) Oral daily  gabapentin 300 milliGRAM(s) Oral three times a day  glucagon  Injectable 1 milliGRAM(s) IntraMuscular once  hydrALAZINE 50 milliGRAM(s) Oral three times a day  insulin lispro (ADMELOG) corrective regimen sliding scale   SubCutaneous three times a day before meals  insulin lispro (ADMELOG) corrective regimen sliding scale   SubCutaneous at bedtime  labetalol 200 milliGRAM(s) Oral two times a day  montelukast 10 milliGRAM(s) Oral daily  senna 2 Tablet(s) Oral at bedtime    ======================================================  Physical Exam:  ============  T(F): 98.4 (24 Feb 2025 20:02), Max: 98.7 (24 Feb 2025 16:41)  HR: 77 (24 Feb 2025 20:02)  BP: 132/68 (24 Feb 2025 17:27)  RR: 18 (24 Feb 2025 20:02)  SpO2: 95% (24 Feb 2025 20:02) (92% - 100%)  temp max in last 48H T(F): , Max: 98.7 (02-24-25 @ 16:41)    General:  No acute distress.  Eye: no conjunctival pallor, no scleral icterus  Respiratory: Lungs are clear to auscultation, Respirations are non-labored.  Cardiovascular: Normal rate, Regular rhythm,  s1+s2  Gastrointestinal: Soft, Non-tender, Non-distended, Normal bowel sounds.  Genitourinary: No costovertebral angle tenderness.  msk: right le scars  Integumentary: No rash. lue avf b/l le edema  Neurologic: Alert, Oriented, No focal deficits  Psychiatric: Appropriate mood & affect.  =======================================================  Labs:                        10.2   6.81  )-----------( 355      ( 24 Feb 2025 06:59 )             35.0     02-24    135  |  95[L]  |  45.6[H]  ----------------------------<  109[H]  5.3   |  27.0  |  7.76[H]    Ca    9.6      24 Feb 2025 06:59        Culture - Blood (collected 02-19-25 @ 09:08)  Source: .Blood Blood  Final Report (02-24-25 @ 13:00):    No growth at 5 days    Culture - Blood (collected 02-19-25 @ 09:08)  Source: .Blood Blood  Final Report (02-24-25 @ 13:00):    No growth at 5 days    Culture - Blood (collected 02-16-25 @ 00:20)  Source: .Blood BLOOD  Final Report (02-21-25 @ 07:00):    No growth at 5 days

## 2025-02-24 NOTE — PROGRESS NOTE ADULT - ASSESSMENT
70 y/o female with PMH of HTN, HLD, ESRD on HD (M/W/F), DM-2 came to the ED complaining of low back pain. Pain has been going on for few days; described as aching, radiating to both thighs anteriorly. Patient reported chronic back pain but in the past few days it has worsened; bending forward/sitting on the side exacerbate the pain. She has no trauma to the back, fall, fever, chills, nausea, vomiting, abdominal pain, change in bowel habit, chest pain, shortness of breath.    ID called for MRi findings reporting L4-L5 discitis/OM    - no fever  - reports low back pain for at least 2 years, now worsening since last week prior to arrival  - recent flu but otherwise no infections, no dental work  - now prior infections of AVG  - has had Rt knee revision in the past but no infections  - MRI LS spine= Discitis/osteomyelitis at L4-L5 with 5 mm anterolisthesis.   - mrsa screen neg  -bcx ngtd  - TTE no vegetations  - trend ESr CRP  - Continue to observe off antibiotics for now  - plan for IR disc biopsy, f/u cultures  - will plan for abx after biopsy specimens obtained  - Trend Fever  - Trend Leukocytosis      Will Follow

## 2025-02-25 LAB
ANION GAP SERPL CALC-SCNC: 12 MMOL/L — SIGNIFICANT CHANGE UP (ref 5–17)
BUN SERPL-MCNC: 34.4 MG/DL — HIGH (ref 8–20)
CALCIUM SERPL-MCNC: 9 MG/DL — SIGNIFICANT CHANGE UP (ref 8.4–10.5)
CHLORIDE SERPL-SCNC: 94 MMOL/L — LOW (ref 96–108)
CO2 SERPL-SCNC: 28 MMOL/L — SIGNIFICANT CHANGE UP (ref 22–29)
CREAT SERPL-MCNC: 5.49 MG/DL — HIGH (ref 0.5–1.3)
EGFR: 8 ML/MIN/1.73M2 — LOW
EGFR: 8 ML/MIN/1.73M2 — LOW
GLUCOSE BLDC GLUCOMTR-MCNC: 103 MG/DL — HIGH (ref 70–99)
GLUCOSE BLDC GLUCOMTR-MCNC: 105 MG/DL — HIGH (ref 70–99)
GLUCOSE BLDC GLUCOMTR-MCNC: 112 MG/DL — HIGH (ref 70–99)
GLUCOSE BLDC GLUCOMTR-MCNC: 166 MG/DL — HIGH (ref 70–99)
GLUCOSE BLDC GLUCOMTR-MCNC: 210 MG/DL — HIGH (ref 70–99)
GLUCOSE BLDC GLUCOMTR-MCNC: 79 MG/DL — SIGNIFICANT CHANGE UP (ref 70–99)
GLUCOSE SERPL-MCNC: 125 MG/DL — HIGH (ref 70–99)
HCT VFR BLD CALC: 33.1 % — LOW (ref 34.5–45)
HGB BLD-MCNC: 9.8 G/DL — LOW (ref 11.5–15.5)
MCHC RBC-ENTMCNC: 27.7 PG — SIGNIFICANT CHANGE UP (ref 27–34)
MCHC RBC-ENTMCNC: 29.6 G/DL — LOW (ref 32–36)
MCV RBC AUTO: 93.5 FL — SIGNIFICANT CHANGE UP (ref 80–100)
NRBC # BLD AUTO: 0 K/UL — SIGNIFICANT CHANGE UP (ref 0–0)
NRBC # FLD: 0 K/UL — SIGNIFICANT CHANGE UP (ref 0–0)
NRBC BLD AUTO-RTO: 0 /100 WBCS — SIGNIFICANT CHANGE UP (ref 0–0)
PLATELET # BLD AUTO: 344 K/UL — SIGNIFICANT CHANGE UP (ref 150–400)
PMV BLD: 10.5 FL — SIGNIFICANT CHANGE UP (ref 7–13)
POTASSIUM SERPL-MCNC: 4.5 MMOL/L — SIGNIFICANT CHANGE UP (ref 3.5–5.3)
POTASSIUM SERPL-SCNC: 4.5 MMOL/L — SIGNIFICANT CHANGE UP (ref 3.5–5.3)
RBC # BLD: 3.54 M/UL — LOW (ref 3.8–5.2)
RBC # FLD: 16.1 % — HIGH (ref 10.3–14.5)
SODIUM SERPL-SCNC: 134 MMOL/L — LOW (ref 135–145)
WBC # BLD: 7.08 K/UL — SIGNIFICANT CHANGE UP (ref 3.8–10.5)
WBC # FLD AUTO: 7.08 K/UL — SIGNIFICANT CHANGE UP (ref 3.8–10.5)

## 2025-02-25 PROCEDURE — 77012 CT SCAN FOR NEEDLE BIOPSY: CPT | Mod: 26

## 2025-02-25 PROCEDURE — 62267 INTERDISCAL PERQ ASPIR DX: CPT

## 2025-02-25 PROCEDURE — 99233 SBSQ HOSP IP/OBS HIGH 50: CPT

## 2025-02-25 RX ORDER — ASPIRIN 325 MG
81 TABLET ORAL DAILY
Refills: 0 | Status: DISCONTINUED | OUTPATIENT
Start: 2025-02-25 | End: 2025-03-04

## 2025-02-25 RX ORDER — HEPARIN SODIUM 1000 [USP'U]/ML
5000 INJECTION INTRAVENOUS; SUBCUTANEOUS EVERY 12 HOURS
Refills: 0 | Status: DISCONTINUED | OUTPATIENT
Start: 2025-02-25 | End: 2025-03-04

## 2025-02-25 RX ORDER — CLOPIDOGREL BISULFATE 75 MG/1
75 TABLET, FILM COATED ORAL DAILY
Refills: 0 | Status: DISCONTINUED | OUTPATIENT
Start: 2025-02-25 | End: 2025-03-04

## 2025-02-25 RX ADMIN — Medication 0.5 MILLIGRAM(S): at 13:00

## 2025-02-25 RX ADMIN — Medication 2 MILLIGRAM(S): at 18:00

## 2025-02-25 RX ADMIN — GABAPENTIN 300 MILLIGRAM(S): 400 CAPSULE ORAL at 17:37

## 2025-02-25 RX ADMIN — Medication 2 TABLET(S): at 22:48

## 2025-02-25 RX ADMIN — Medication 0.5 MILLIGRAM(S): at 22:49

## 2025-02-25 RX ADMIN — Medication 0.5 MILLIGRAM(S): at 12:05

## 2025-02-25 RX ADMIN — HEPARIN SODIUM 5000 UNIT(S): 1000 INJECTION INTRAVENOUS; SUBCUTANEOUS at 22:49

## 2025-02-25 RX ADMIN — CLOPIDOGREL BISULFATE 75 MILLIGRAM(S): 75 TABLET, FILM COATED ORAL at 17:36

## 2025-02-25 RX ADMIN — Medication 50 MILLIGRAM(S): at 05:30

## 2025-02-25 RX ADMIN — MONTELUKAST SODIUM 10 MILLIGRAM(S): 10 TABLET ORAL at 12:05

## 2025-02-25 RX ADMIN — Medication 0.5 MILLIGRAM(S): at 23:49

## 2025-02-25 RX ADMIN — LABETALOL HYDROCHLORIDE 200 MILLIGRAM(S): 200 TABLET, FILM COATED ORAL at 17:37

## 2025-02-25 RX ADMIN — ATORVASTATIN CALCIUM 20 MILLIGRAM(S): 80 TABLET, FILM COATED ORAL at 22:49

## 2025-02-25 RX ADMIN — Medication 81 MILLIGRAM(S): at 17:37

## 2025-02-25 RX ADMIN — Medication 2 MILLIGRAM(S): at 17:37

## 2025-02-25 RX ADMIN — GABAPENTIN 300 MILLIGRAM(S): 400 CAPSULE ORAL at 12:04

## 2025-02-25 RX ADMIN — LABETALOL HYDROCHLORIDE 200 MILLIGRAM(S): 200 TABLET, FILM COATED ORAL at 05:30

## 2025-02-25 RX ADMIN — Medication 1 APPLICATION(S): at 12:08

## 2025-02-25 RX ADMIN — FOLIC ACID 1 MILLIGRAM(S): 1 TABLET ORAL at 12:03

## 2025-02-25 NOTE — PROGRESS NOTE ADULT - SUBJECTIVE AND OBJECTIVE BOX
Patient is a 69y old  Female who presents with a chief complaint of Low back pain (23 Feb 2025 15:41)      No acute issues. back pain stable  no sob,cp  REVIEW OF SYSTEMS: All systems are reviewed and found to be negative except above    MEDICATIONS  (STANDING):  atorvastatin 20 milliGRAM(s) Oral at bedtime  chlorhexidine 2% Cloths 1 Application(s) Topical daily  dextrose 5%. 1000 milliLiter(s) (50 mL/Hr) IV Continuous <Continuous>  dextrose 5%. 1000 milliLiter(s) (100 mL/Hr) IV Continuous <Continuous>  dextrose 50% Injectable 25 Gram(s) IV Push once  dextrose 50% Injectable 12.5 Gram(s) IV Push once  dextrose 50% Injectable 25 Gram(s) IV Push once  epoetin dereck-epbx (RETACRIT) Injectable 65952 Unit(s) IV Push <User Schedule>  folic acid 1 milliGRAM(s) Oral daily  gabapentin 300 milliGRAM(s) Oral three times a day  glucagon  Injectable 1 milliGRAM(s) IntraMuscular once  hydrALAZINE 50 milliGRAM(s) Oral three times a day  insulin lispro (ADMELOG) corrective regimen sliding scale   SubCutaneous three times a day before meals  insulin lispro (ADMELOG) corrective regimen sliding scale   SubCutaneous at bedtime  labetalol 200 milliGRAM(s) Oral two times a day  montelukast 10 milliGRAM(s) Oral daily  senna 2 Tablet(s) Oral at bedtime    MEDICATIONS  (PRN):  acetaminophen     Tablet .. 650 milliGRAM(s) Oral every 6 hours PRN Temp greater or equal to 38C (100.4F), Mild Pain (1 - 3)  albuterol    90 MICROgram(s) HFA Inhaler 2 Puff(s) Inhalation every 6 hours PRN for shortness of breath and/or wheezing  aluminum hydroxide/magnesium hydroxide/simethicone Suspension 30 milliLiter(s) Oral every 4 hours PRN Dyspepsia  dextrose Oral Gel 15 Gram(s) Oral once PRN Blood Glucose LESS THAN 70 milliGRAM(s)/deciliter  HYDROmorphone   Tablet 2 milliGRAM(s) Oral every 6 hours PRN Moderate Pain (4 - 6)  HYDROmorphone   Tablet 4 milliGRAM(s) Oral every 6 hours PRN Severe Pain (7 - 10)  HYDROmorphone  Injectable 0.5 milliGRAM(s) IV Push every 4 hours PRN Breakthrough pain  melatonin 3 milliGRAM(s) Oral at bedtime PRN Insomnia  naloxone Injectable 1 milliGRAM(s) IV Push once PRN For Opioid OD  ondansetron Injectable 4 milliGRAM(s) IV Push every 8 hours PRN Nausea and/or Vomiting      CAPILLARY BLOOD GLUCOSE      POCT Blood Glucose.: 103 mg/dL (25 Feb 2025 12:13)  POCT Blood Glucose.: 105 mg/dL (25 Feb 2025 08:48)  POCT Blood Glucose.: 112 mg/dL (25 Feb 2025 06:29)  POCT Blood Glucose.: 210 mg/dL (25 Feb 2025 02:05)  POCT Blood Glucose.: 245 mg/dL (24 Feb 2025 22:17)  POCT Blood Glucose.: 110 mg/dL (24 Feb 2025 17:23)  POCT Blood Glucose.: 78 mg/dL (24 Feb 2025 14:02)    I&O's Summary    24 Feb 2025 07:01  -  25 Feb 2025 07:00  --------------------------------------------------------  IN: 1730 mL / OUT: 1600 mL / NET: 130 mL        PHYSICAL EXAM:  Vital Signs Last 24 Hrs  T(C): 36.8 (25 Feb 2025 08:26), Max: 37.1 (24 Feb 2025 16:41)  T(F): 98.2 (25 Feb 2025 08:26), Max: 98.7 (24 Feb 2025 16:41)  HR: 83 (25 Feb 2025 08:26) (67 - 83)  BP: 100/61 (25 Feb 2025 08:26) (100/50 - 132/68)  BP(mean): 96 (24 Feb 2025 14:30) (96 - 96)  RR: 18 (25 Feb 2025 08:26) (18 - 19)  SpO2: 94% (25 Feb 2025 08:26) (92% - 97%)    Parameters below as of 25 Feb 2025 08:26  Patient On (Oxygen Delivery Method): room air        CONSTITUTIONAL: NAD,  EYES: PERRLA; conjunctiva and sclera clear  ENMT: Moist oral mucosa,   RESPIRATORY: Normal respiratory effort; lungs are clear to auscultation bilaterally  CARDIOVASCULAR: Regular rate and rhythm, normal S1 and S2, no murmur   EXTS: No lower extremity edema; Peripheral pulses are 2+ bilaterally  ABDOMEN: Nontender to palpation, normoactive bowel sounds, no rebound/guarding;   MUSCLOSKELETAL:   rt knee:  no joint swelling,mild  tenderness to palpation  PSYCH: affect appropriate  NEUROLOGY: A+O to person, place, and time; CN 2-12 are intact and symmetric; no gross sensory deficits;       LABS:                        9.8    7.08  )-----------( 344      ( 25 Feb 2025 05:12 )             33.1     02-25    134[L]  |  94[L]  |  34.4[H]  ----------------------------<  125[H]  4.5   |  28.0  |  5.49[H]    Ca    9.0      25 Feb 2025 05:12      PT/INR - ( 24 Feb 2025 11:45 )   PT: 10.6 sec;   INR: 0.94 ratio         PTT - ( 24 Feb 2025 11:45 )  PTT:28.9 sec      Urinalysis Basic - ( 25 Feb 2025 05:12 )    Color: x / Appearance: x / SG: x / pH: x  Gluc: 125 mg/dL / Ketone: x  / Bili: x / Urobili: x   Blood: x / Protein: x / Nitrite: x   Leuk Esterase: x / RBC: x / WBC x   Sq Epi: x / Non Sq Epi: x / Bacteria: x          RADIOLOGY & ADDITIONAL TESTS:  Results Reviewed:

## 2025-02-25 NOTE — PROGRESS NOTE ADULT - SUBJECTIVE AND OBJECTIVE BOX
EULOGIO NIELSEN  [id number]                    SUBJECTIVE:69y Female with L4-5 discitis.  The patient's pain is controlled using the prescribed pain medications. The continues to complain of lower back pain and RLE pain radiating into her R thigh. Denies nausea, vomiting, chest pain, shortness of breath, abdominal pain or fever. No new complaints.      OBJECTIVE:     Vital Signs Last 24 Hrs  T(C): 36.9 (25 Feb 2025 04:30), Max: 37.1 (24 Feb 2025 16:41)  T(F): 98.4 (25 Feb 2025 04:30), Max: 98.7 (24 Feb 2025 16:41)  HR: 67 (25 Feb 2025 04:30) (67 - 78)  BP: 120/60 (25 Feb 2025 04:30) (100/50 - 132/68)  BP(mean): 96 (24 Feb 2025 14:30) (96 - 96)  RR: 18 (25 Feb 2025 04:30) (18 - 19)  SpO2: 95% (25 Feb 2025 04:30) (92% - 100%)    Parameters below as of 25 Feb 2025 04:30  Patient On (Oxygen Delivery Method): room air      I&O's Summary    24 Feb 2025 07:01  -  25 Feb 2025 07:00  --------------------------------------------------------  IN: 1730 mL / OUT: 1600 mL / NET: 130 mL      Appearance: Alert, responsive, in no acute distress.    Skin: no rash on visible skin. Skin is clean, dry and intact. No bleeding. No abrasions. No ulcerations.    Musculoskeletal:         Lumbar: Tenderness to palpation lower lumbar paraspinal muscles       Left Lower Extremity: Sensation is grossly intact to light touch. 2+ distal pulses. Cap refill < 2 sec.        Right Lower Extremity: Sensation is grossly intact to light touch. 2+ distal pulses. Cap refill < 2 sec.        Motor exam: [  ]             [ ] Lower extremity                    HF(l2)    KE(l3)   TA(l4)  EHL(l5)    GS(s1)                                                 R        4/5        4/5        5/5       5/5         5/5                                               L         5/5        5/5       5/5       5/5 5/5  LABS:                        9.8    7.08  )-----------( 344      ( 25 Feb 2025 05:12 )             33.1       02-25    134[L]  |  94[L]  |  34.4[H]  ----------------------------<  125[H]  4.5   |  28.0  |  5.49[H]    Ca    9.0      25 Feb 2025 05:12          RADIOLOGY & ADDITIONAL STUDIES:      Plan:   • Pain control as clinically indicated  • DVT prophylaxis as prescribed, including use of compression devices and ankle pumps  • Incentive spirometry encouraged  • Abx as per ID, f/u IR aspiration as per ID

## 2025-02-25 NOTE — PROGRESS NOTE ADULT - ASSESSMENT
70 y/o female with PMH of HTN, HLD, ESRD on HD (M/W/F), DM-2 came to the ED complaining of low back pain.Found to have Discitis/OM of L4-5 on mri spine,ct lumbar spine.Per Ortho - Offered surgery but patient wants conservative management   IR consulted for possible biopsy - biopsy  by IR, asa/plavix needs to hold for 5 days.    #Acute on chronic low back pain   #Discitis/OM of L4-5  Biopsy Today per IR, asa/plavix on hold for 5 days,NPO FROM MID NIGHT. hold  SQ HEPARIN  CT lumbar degenerative disc changes at L4-L5 concerning for discitis/OM   MRI with OM/Discitis of L4/5  hold antibiotic for now per ID  Blood cx neg    Pain management following   Per Ortho - Offered surgery but patient wants conservative management       #Right knee pain   XR knee: lateral prosthetic patellar dislocation  Seen by ortho  Pain control . Seen by pain mx  PT eval     #ESRD   On HD (M/W/F)   Nephrology following     #HTN/HLD/CAD   Aspirin /plavix 75mg  HELD FOR BIOPSY  Simvastatin 40mg   Labetalol 200mg bid   Hydralazine 50mg       #Hyperglycemia with DM-2   Patient not on medication   HbA1C: 5.5 (07/24/23)   Insulin sliding scale   A1C of 6.9      DVT prophylaxis: Heparin sc      Dispo: Pending IR biopsy to r/o Discitis/OM today  dw pt   70 y/o female with PMH of HTN, HLD, ESRD on HD (M/W/F), DM-2 came to the ED complaining of low back pain.Found to have Discitis/OM of L4-5 on mri spine,ct lumbar spine.Per Ortho - Offered surgery but patient wants conservative management   IR consulted for possible biopsy - biopsy  by IR, asa/plavix needs to hold for 5 days.    #Acute on chronic low back pain   #Discitis/OM of L4-5  Biopsy Today per IR, asa/plavix on hold for 5 days,NPO FROM MID NIGHT. hold  SQ HEPARIN  CT lumbar degenerative disc changes at L4-L5 concerning for discitis/OM   MRI with OM/Discitis of L4/5  hold antibiotic for now per ID  Blood cx neg    Pain management following   Per Ortho - Offered surgery but patient wants conservative management       #Right knee pain   XR knee: lateral prosthetic patellar dislocation  Seen by ortho  Pain control . Seen by pain mx  PT eval     #ESRD   On HD (M/W/F)   Nephrology following     #HTN/HLD/CAD   Aspirin /plavix 75mg  HELD FOR BIOPSY  Simvastatin 40mg   Labetalol 200mg bid   Hydralazine 50mg       #Hyperglycemia with DM-2   Patient not on medication   HbA1C: 5.5 (07/24/23)   Insulin sliding scale   A1C of 6.9      DVT prophylaxis:scd's    on hold Heparin sc  F/U IR rec when can resume asa/plavix/sq heparin      Dispo: Pending IR biopsy to r/o Discitis/OM today  dw pt   UPDATE: Pt s/p biopsy. dw IR, Okay to resume ASA,PLAVIX,SQ HEPARIN TODAY 6 PM

## 2025-02-26 LAB
ANION GAP SERPL CALC-SCNC: 13 MMOL/L — SIGNIFICANT CHANGE UP (ref 5–17)
BUN SERPL-MCNC: 44 MG/DL — HIGH (ref 8–20)
CALCIUM SERPL-MCNC: 8.9 MG/DL — SIGNIFICANT CHANGE UP (ref 8.4–10.5)
CHLORIDE SERPL-SCNC: 94 MMOL/L — LOW (ref 96–108)
CO2 SERPL-SCNC: 25 MMOL/L — SIGNIFICANT CHANGE UP (ref 22–29)
CREAT SERPL-MCNC: 7.14 MG/DL — HIGH (ref 0.5–1.3)
EGFR: 6 ML/MIN/1.73M2 — LOW
EGFR: 6 ML/MIN/1.73M2 — LOW
GLUCOSE BLDC GLUCOMTR-MCNC: 162 MG/DL — HIGH (ref 70–99)
GLUCOSE BLDC GLUCOMTR-MCNC: 187 MG/DL — HIGH (ref 70–99)
GLUCOSE BLDC GLUCOMTR-MCNC: 218 MG/DL — HIGH (ref 70–99)
GLUCOSE BLDC GLUCOMTR-MCNC: 98 MG/DL — SIGNIFICANT CHANGE UP (ref 70–99)
GLUCOSE SERPL-MCNC: 98 MG/DL — SIGNIFICANT CHANGE UP (ref 70–99)
GRAM STN FLD: SIGNIFICANT CHANGE UP
HCT VFR BLD CALC: 32.3 % — LOW (ref 34.5–45)
HGB BLD-MCNC: 9.6 G/DL — LOW (ref 11.5–15.5)
MCHC RBC-ENTMCNC: 27.7 PG — SIGNIFICANT CHANGE UP (ref 27–34)
MCHC RBC-ENTMCNC: 29.7 G/DL — LOW (ref 32–36)
MCV RBC AUTO: 93.4 FL — SIGNIFICANT CHANGE UP (ref 80–100)
NIGHT BLUE STAIN TISS: SIGNIFICANT CHANGE UP
NRBC # BLD AUTO: 0 K/UL — SIGNIFICANT CHANGE UP (ref 0–0)
NRBC # FLD: 0 K/UL — SIGNIFICANT CHANGE UP (ref 0–0)
NRBC BLD AUTO-RTO: 0 /100 WBCS — SIGNIFICANT CHANGE UP (ref 0–0)
PLATELET # BLD AUTO: 337 K/UL — SIGNIFICANT CHANGE UP (ref 150–400)
PMV BLD: 9.9 FL — SIGNIFICANT CHANGE UP (ref 7–13)
POTASSIUM SERPL-MCNC: 4.8 MMOL/L — SIGNIFICANT CHANGE UP (ref 3.5–5.3)
POTASSIUM SERPL-SCNC: 4.8 MMOL/L — SIGNIFICANT CHANGE UP (ref 3.5–5.3)
RBC # BLD: 3.46 M/UL — LOW (ref 3.8–5.2)
RBC # FLD: 16.4 % — HIGH (ref 10.3–14.5)
SODIUM SERPL-SCNC: 132 MMOL/L — LOW (ref 135–145)
SPECIMEN SOURCE: SIGNIFICANT CHANGE UP
SPECIMEN SOURCE: SIGNIFICANT CHANGE UP
WBC # BLD: 5.76 K/UL — SIGNIFICANT CHANGE UP (ref 3.8–10.5)
WBC # FLD AUTO: 5.76 K/UL — SIGNIFICANT CHANGE UP (ref 3.8–10.5)

## 2025-02-26 PROCEDURE — 99232 SBSQ HOSP IP/OBS MODERATE 35: CPT

## 2025-02-26 PROCEDURE — 90935 HEMODIALYSIS ONE EVALUATION: CPT

## 2025-02-26 PROCEDURE — 99233 SBSQ HOSP IP/OBS HIGH 50: CPT

## 2025-02-26 PROCEDURE — G0545: CPT

## 2025-02-26 RX ORDER — CEFEPIME 2 G/20ML
2000 INJECTION, POWDER, FOR SOLUTION INTRAVENOUS
Refills: 0 | Status: DISCONTINUED | OUTPATIENT
Start: 2025-02-26 | End: 2025-03-04

## 2025-02-26 RX ORDER — VANCOMYCIN HCL IN 5 % DEXTROSE 1.5G/250ML
1000 PLASTIC BAG, INJECTION (ML) INTRAVENOUS
Refills: 0 | Status: DISCONTINUED | OUTPATIENT
Start: 2025-02-26 | End: 2025-02-28

## 2025-02-26 RX ADMIN — EPOETIN ALFA 10000 UNIT(S): 10000 SOLUTION INTRAVENOUS; SUBCUTANEOUS at 14:58

## 2025-02-26 RX ADMIN — Medication 50 MILLIGRAM(S): at 06:04

## 2025-02-26 RX ADMIN — CEFEPIME 2000 MILLIGRAM(S): 2 INJECTION, POWDER, FOR SOLUTION INTRAVENOUS at 17:00

## 2025-02-26 RX ADMIN — Medication 4 MILLIGRAM(S): at 14:59

## 2025-02-26 RX ADMIN — ATORVASTATIN CALCIUM 20 MILLIGRAM(S): 80 TABLET, FILM COATED ORAL at 21:42

## 2025-02-26 RX ADMIN — Medication 2 TABLET(S): at 21:42

## 2025-02-26 RX ADMIN — INSULIN LISPRO 1: 100 INJECTION, SOLUTION INTRAVENOUS; SUBCUTANEOUS at 08:04

## 2025-02-26 RX ADMIN — LABETALOL HYDROCHLORIDE 200 MILLIGRAM(S): 200 TABLET, FILM COATED ORAL at 17:00

## 2025-02-26 RX ADMIN — Medication 4 MILLIGRAM(S): at 09:19

## 2025-02-26 RX ADMIN — HEPARIN SODIUM 5000 UNIT(S): 1000 INJECTION INTRAVENOUS; SUBCUTANEOUS at 11:46

## 2025-02-26 RX ADMIN — LABETALOL HYDROCHLORIDE 200 MILLIGRAM(S): 200 TABLET, FILM COATED ORAL at 06:03

## 2025-02-26 RX ADMIN — FOLIC ACID 1 MILLIGRAM(S): 1 TABLET ORAL at 11:45

## 2025-02-26 RX ADMIN — Medication 250 MILLIGRAM(S): at 17:00

## 2025-02-26 RX ADMIN — HEPARIN SODIUM 5000 UNIT(S): 1000 INJECTION INTRAVENOUS; SUBCUTANEOUS at 21:42

## 2025-02-26 RX ADMIN — CLOPIDOGREL BISULFATE 75 MILLIGRAM(S): 75 TABLET, FILM COATED ORAL at 11:45

## 2025-02-26 RX ADMIN — MONTELUKAST SODIUM 10 MILLIGRAM(S): 10 TABLET ORAL at 11:45

## 2025-02-26 RX ADMIN — GABAPENTIN 300 MILLIGRAM(S): 400 CAPSULE ORAL at 11:45

## 2025-02-26 RX ADMIN — INSULIN LISPRO 1: 100 INJECTION, SOLUTION INTRAVENOUS; SUBCUTANEOUS at 11:46

## 2025-02-26 RX ADMIN — Medication 81 MILLIGRAM(S): at 11:46

## 2025-02-26 RX ADMIN — Medication 1 APPLICATION(S): at 11:48

## 2025-02-26 RX ADMIN — GABAPENTIN 300 MILLIGRAM(S): 400 CAPSULE ORAL at 00:49

## 2025-02-26 RX ADMIN — Medication 4 MILLIGRAM(S): at 08:19

## 2025-02-26 NOTE — PROGRESS NOTE ADULT - ASSESSMENT
68 y/o female with PMH of HTN, HLD, ESRD on HD (M/W/F), DM-2 came to the ED complaining of low back pain.Found to have Discitis/OM of L4-5 on mri spine,ct lumbar spine.Per Ortho - Offered surgery but patient wants conservative management   IR consulted for possible biopsy - biopsy  by IR, asa/plavix needs to hold for 5 days.    #Acute on chronic low back pain   #Discitis/OM of L4-5  S/P Biopsy by IR on 02/25  start cefepime and vancomycin with HD, monitor trough Per ID   f/u biopsy result  CT lumbar degenerative disc changes at L4-L5 concerning for discitis/OM   MRI with OM/Discitis of L4/5  Blood cx neg    Pain management following   Per Ortho - Offered surgery but patient wants conservative management   PT eval      #Right knee pain   XR knee: lateral prosthetic patellar dislocation  Seen by ortho  Pain control . Seen by pain mx  PT eval     #ESRD   On HD (M/W/F)   Nephrology following     #HTN/HLD/CAD   Aspirin /plavix 75mg    Simvastatin 40mg   Labetalol 200mg bid   Hydralazine 50mg       #Hyperglycemia with DM-2   Patient not on medication   HbA1C: 5.5 (07/24/23)   Insulin sliding scale   A1C of 6.9      DVT prophylaxis: Heparin sc        Dispo: Pending. Pending biopsy result. PT eval. Likely need GENESIS  dw pt  SW for safe dsicharge  dw pt   UPDATE: Pt s/p biopsy. [facility] IR, Okay to resume ASA,PLAVIX,SQ HEPARIN TODAY 6 PM

## 2025-02-26 NOTE — DIETITIAN INITIAL EVALUATION ADULT - PERTINENT MEDS FT
MEDICATIONS  (STANDING):  dextrose 5%. 1000 milliLiter(s) (100 mL/Hr) IV Continuous <Continuous>  folic acid 1 milliGRAM(s) Oral daily  insulin lispro (ADMELOG) corrective regimen sliding scale   SubCutaneous three times a day before meals  senna 2 Tablet(s) Oral at bedtime

## 2025-02-26 NOTE — PROGRESS NOTE ADULT - SUBJECTIVE AND OBJECTIVE BOX
[facility] DIVISION OF KIDNEY DISEASES AND HYPERTENSION -- DIALYSIS NOTE    Patient seen and examined during dialysis  Patient tolerating the procedure well.     VITALS  --------------------------------------------------------------------------------  T(C): 36.8 (02-26-25 @ 12:21), Max: 36.8 (02-25-25 @ 16:20)  HR: 61 (02-26-25 @ 12:21) (61 - 75)  BP: 147/41 (02-26-25 @ 12:21) (105/71 - 147/41)  RR: 18 (02-26-25 @ 12:21) (18 - 19)  SpO2: 97% (02-26-25 @ 12:21) (92% - 100%)  Wt(kg): --     Will continue the current medical management. Next hemodialysis as scheduled.     BP lower side  minimal UF

## 2025-02-26 NOTE — DIETITIAN INITIAL EVALUATION ADULT - NS FNS DIET ORDER
Diet, Renal Restrictions:   For patients receiving Renal Replacement - No Protein Restr, No Conc K, No Conc Phos, Low Sodium (02-16-25 @ 03:46)

## 2025-02-26 NOTE — PROGRESS NOTE ADULT - SUBJECTIVE AND OBJECTIVE BOX
Patient is a 69y old  Female who presents with a chief complaint of Low back pain (23 Feb 2025 15:41)      pt has back pain,stable am. pain with movement of rt knee  REVIEW OF SYSTEMS: All systems are reviewed and found to be negative except above    MEDICATIONS  (STANDING):  aspirin  chewable 81 milliGRAM(s) Oral daily  atorvastatin 20 milliGRAM(s) Oral at bedtime  cefepime  Injectable. 2000 milliGRAM(s) IV Push <User Schedule>  chlorhexidine 2% Cloths 1 Application(s) Topical daily  clopidogrel Tablet 75 milliGRAM(s) Oral daily  dextrose 5%. 1000 milliLiter(s) (100 mL/Hr) IV Continuous <Continuous>  dextrose 5%. 1000 milliLiter(s) (50 mL/Hr) IV Continuous <Continuous>  dextrose 50% Injectable 25 Gram(s) IV Push once  dextrose 50% Injectable 12.5 Gram(s) IV Push once  dextrose 50% Injectable 25 Gram(s) IV Push once  epoetin dereck-epbx (RETACRIT) Injectable 75780 Unit(s) IV Push <User Schedule>  folic acid 1 milliGRAM(s) Oral daily  gabapentin 300 milliGRAM(s) Oral three times a day  glucagon  Injectable 1 milliGRAM(s) IntraMuscular once  heparin   Injectable 5000 Unit(s) SubCutaneous every 12 hours  hydrALAZINE 50 milliGRAM(s) Oral three times a day  insulin lispro (ADMELOG) corrective regimen sliding scale   SubCutaneous three times a day before meals  insulin lispro (ADMELOG) corrective regimen sliding scale   SubCutaneous at bedtime  labetalol 200 milliGRAM(s) Oral two times a day  montelukast 10 milliGRAM(s) Oral daily  senna 2 Tablet(s) Oral at bedtime  vancomycin  IVPB 1000 milliGRAM(s) IV Intermittent <User Schedule>    MEDICATIONS  (PRN):  acetaminophen     Tablet .. 650 milliGRAM(s) Oral every 6 hours PRN Temp greater or equal to 38C (100.4F), Mild Pain (1 - 3)  albuterol    90 MICROgram(s) HFA Inhaler 2 Puff(s) Inhalation every 6 hours PRN for shortness of breath and/or wheezing  aluminum hydroxide/magnesium hydroxide/simethicone Suspension 30 milliLiter(s) Oral every 4 hours PRN Dyspepsia  dextrose Oral Gel 15 Gram(s) Oral once PRN Blood Glucose LESS THAN 70 milliGRAM(s)/deciliter  HYDROmorphone   Tablet 2 milliGRAM(s) Oral every 6 hours PRN Moderate Pain (4 - 6)  HYDROmorphone   Tablet 4 milliGRAM(s) Oral every 6 hours PRN Severe Pain (7 - 10)  melatonin 3 milliGRAM(s) Oral at bedtime PRN Insomnia  naloxone Injectable 1 milliGRAM(s) IV Push once PRN For Opioid OD  ondansetron Injectable 4 milliGRAM(s) IV Push every 8 hours PRN Nausea and/or Vomiting      CAPILLARY BLOOD GLUCOSE      POCT Blood Glucose.: 187 mg/dL (26 Feb 2025 11:25)  POCT Blood Glucose.: 162 mg/dL (26 Feb 2025 07:48)  POCT Blood Glucose.: 166 mg/dL (25 Feb 2025 22:48)  POCT Blood Glucose.: 79 mg/dL (25 Feb 2025 17:54)    I&O's Summary    25 Feb 2025 07:01  -  26 Feb 2025 07:00  --------------------------------------------------------  IN: 230 mL / OUT: 0 mL / NET: 230 mL        PHYSICAL EXAM:  Vital Signs Last 24 Hrs  T(C): 36.8 (26 Feb 2025 12:21), Max: 36.8 (25 Feb 2025 16:20)  T(F): 98.2 (26 Feb 2025 12:21), Max: 98.2 (25 Feb 2025 16:20)  HR: 61 (26 Feb 2025 12:21) (61 - 75)  BP: 147/41 (26 Feb 2025 12:21) (105/71 - 147/41)  BP(mean): --  RR: 18 (26 Feb 2025 12:21) (18 - 19)  SpO2: 97% (26 Feb 2025 12:21) (92% - 100%)    Parameters below as of 26 Feb 2025 12:21  Patient On (Oxygen Delivery Method): room air        CONSTITUTIONAL: NAD,  EYES: PERRLA; conjunctiva and sclera clear  ENMT: Moist oral mucosa,   RESPIRATORY: Normal respiratory effort; lungs are clear to auscultation bilaterally  CARDIOVASCULAR: Regular rate and rhythm, normal S1 and S2, no murmur   EXTS: No lower extremity edema; Peripheral pulses are 2+ bilaterally  ABDOMEN: Nontender to palpation, normoactive bowel sounds, no rebound/guarding;   MUSCLOSKELETAL: rt knee no joint swelling,mild  tenderness to palpation.back-ptdoes not want to roll back   PSYCH: affect appropriate  NEUROLOGY: A+O to person, place, and time; CN 2-12 are intact and symmetric; no gross sensory deficits;       LABS:                        9.6    5.76  )-----------( 337      ( 26 Feb 2025 06:55 )             32.3     02-26    132[L]  |  94[L]  |  44.0[H]  ----------------------------<  98  4.8   |  25.0  |  7.14[H]    Ca    8.9      26 Feb 2025 06:55            Urinalysis Basic - ( 26 Feb 2025 06:55 )    Color: x / Appearance: x / SG: x / pH: x  Gluc: 98 mg/dL / Ketone: x  / Bili: x / Urobili: x   Blood: x / Protein: x / Nitrite: x   Leuk Esterase: x / RBC: x / WBC x   Sq Epi: x / Non Sq Epi: x / Bacteria: x        Culture - Acid Fast - Other w/Smear (collected 25 Feb 2025 11:27)  Source: .Other Other, L4-L5 DISC    Culture - Fungal, Other (collected 25 Feb 2025 11:27)  Source: .Other Other, L4-L5 DISC  Preliminary Report (26 Feb 2025 08:38):    Testing in progress    Culture - Aspirate with Gram Stain (collected 25 Feb 2025 11:27)  Source: Aspirate Other, L4-L5 DISC  Gram Stain (26 Feb 2025 00:40):    No polymorphonuclear cells seen per low power field    No organisms seen per oil power field        RADIOLOGY & ADDITIONAL TESTS:  Results Reviewed:

## 2025-02-26 NOTE — DIETITIAN INITIAL EVALUATION ADULT - ORAL INTAKE PTA/DIET HISTORY
Spoke with pt this AM. Good po intake reported today and PTA. RD visualized breakfast tray being consumed at time of visit with good po intake. Unsure UBW. Diet/nutrition education declined at this time. RD to remain available.

## 2025-02-26 NOTE — DIETITIAN INITIAL EVALUATION ADULT - PERTINENT LABORATORY DATA
02-26    132[L]  |  94[L]  |  44.0[H]  ----------------------------<  98  4.8   |  25.0  |  7.14[H]    Ca    8.9      26 Feb 2025 06:55    POCT Blood Glucose.: 187 mg/dL (02-26-25 @ 11:25)  A1C with Estimated Average Glucose Result: 6.9 % (02-16-25 @ 06:15)

## 2025-02-26 NOTE — DIETITIAN INITIAL EVALUATION ADULT - ADD RECOMMEND
- Monitor weights daily for trend/accuracy   - Continue diet as tolerated.   - Rx MVI daily, vit C 500mg

## 2025-02-26 NOTE — PROGRESS NOTE ADULT - SUBJECTIVE AND OBJECTIVE BOX
[facility]                                                INFECTIOUS DISEASES  =======================================================                   Pascual Lopez#   Declan Kevin MD#    Jing Ritter MD*                           Natalia Tamayo MD*   Sylvia Mo MD*   Iva Akbar *           Diplomates American Board of Internal Medicine & Infectious Diseases                  # [place] Office - Appt - Tel  [phone] Fax [phone]                * [place] Office - Appt - Tel [phone] Fax [phone]                                  Hospital Consult line:  [phone]  =======================================================      [facility]-[id number]  EULOGIO NIELSEN   follow up for: discitis  c/o back pain  s/p IR biopsy  patient seen and examined.       I have personally reviewed the labs and data; pertinent labs and data are listed in this note; please see below.   ===================================================  REVIEW OF SYSTEMS:  CONSTITUTIONAL:  No Fever or chills  HEENT:  No diplopia or blurred vision.  No earache, sore throat or runny nose.  CARDIOVASCULAR:  No pressure, squeezing, strangling, tightness, heaviness or aching about the chest, neck, axilla or epigastrium.  RESPIRATORY:  No cough, shortness of breath  GASTROINTESTINAL:  No nausea, vomiting or diarrhea.  GENITOURINARY:  No dysuria, frequency or urgency. No Blood in urine  MUSCULOSKELETAL:  no joint aches, no muscle pain  SKIN:  No change in skin, hair or nails.  NEUROLOGIC:  No Headaches, seizures or weakness.  PSYCHIATRIC:  No disorder of thought or mood.  ENDOCRINE:  No heat or cold intolerance  HEMATOLOGICAL:  No easy bruising or bleeding.    =======================================================  Allergies    lip swelling with lobster (Swelling)  erythromycin (Vomiting)  shellfish (Swelling; Hives)    Intolerances    Antibiotics:    Other medications:  atorvastatin 20 milliGRAM(s) Oral at bedtime  chlorhexidine 2% Cloths 1 Application(s) Topical daily  dextrose 5%. 1000 milliLiter(s) IV Continuous <Continuous>  dextrose 5%. 1000 milliLiter(s) IV Continuous <Continuous>  dextrose 50% Injectable 25 Gram(s) IV Push once  dextrose 50% Injectable 12.5 Gram(s) IV Push once  dextrose 50% Injectable 25 Gram(s) IV Push once  epoetin dereck-epbx (RETACRIT) Injectable 12645 Unit(s) IV Push <User Schedule>  folic acid 1 milliGRAM(s) Oral daily  gabapentin 300 milliGRAM(s) Oral three times a day  glucagon  Injectable 1 milliGRAM(s) IntraMuscular once  hydrALAZINE 50 milliGRAM(s) Oral three times a day  insulin lispro (ADMELOG) corrective regimen sliding scale   SubCutaneous three times a day before meals  insulin lispro (ADMELOG) corrective regimen sliding scale   SubCutaneous at bedtime  labetalol 200 milliGRAM(s) Oral two times a day  montelukast 10 milliGRAM(s) Oral daily  senna 2 Tablet(s) Oral at bedtime    ======================================================  Physical Exam:  ============  Vital Signs Last 24 Hrs  T(C): 36.8 (26 Feb 2025 12:21), Max: 36.8 (25 Feb 2025 16:20)  T(F): 98.2 (26 Feb 2025 12:21), Max: 98.2 (25 Feb 2025 16:20)  HR: 61 (26 Feb 2025 12:21) (61 - 75)  BP: 147/41 (26 Feb 2025 12:21) (105/71 - 147/41)  BP(mean): --  RR: 18 (26 Feb 2025 12:21) (18 - 19)  SpO2: 97% (26 Feb 2025 12:21) (92% - 100%)    Parameters below as of 26 Feb 2025 12:21  Patient On (Oxygen Delivery Method): room air        General:  No acute distress.  Eye: no conjunctival pallor, no scleral icterus  Respiratory: Lungs are clear to auscultation, Respirations are non-labored.  Cardiovascular: Normal rate, Regular rhythm,  s1+s2  Gastrointestinal: Soft, Non-tender, Non-distended, Normal bowel sounds.  Genitourinary: No costovertebral angle tenderness.  msk: right le scars  Integumentary: No rash. lue avf b/l le edema  Neurologic: Alert, Oriented, No focal deficits  Psychiatric: Appropriate mood & affect.  =======================================================  Labs:                                   9.6    5.76  )-----------( 337      ( 26 Feb 2025 06:55 )             32.3       02-26    132[L]  |  94[L]  |  44.0[H]  ----------------------------<  98  4.8   |  25.0  |  7.14[H]    Ca    8.9      26 Feb 2025 06:55                Urinalysis Basic - ( 26 Feb 2025 06:55 )    Color: x / Appearance: x / SG: x / pH: x  Gluc: 98 mg/dL / Ketone: x  / Bili: x / Urobili: x   Blood: x / Protein: x / Nitrite: x   Leuk Esterase: x / RBC: x / WBC x   Sq Epi: x / Non Sq Epi: x / Bacteria: x                  CAPILLARY BLOOD GLUCOSE      POCT Blood Glucose.: 187 mg/dL (26 Feb 2025 11:25)              Culture - Blood (collected 02-19-25 @ 09:08)  Source: .Blood Blood  Final Report (02-24-25 @ 13:00):    No growth at 5 days    Culture - Blood (collected 02-19-25 @ 09:08)  Source: .Blood Blood  Final Report (02-24-25 @ 13:00):    No growth at 5 days    Culture - Blood (collected 02-16-25 @ 00:20)  Source: .Blood BLOOD  Final Report (02-21-25 @ 07:00):    No growth at 5 days

## 2025-02-26 NOTE — PROGRESS NOTE ADULT - ASSESSMENT
68 y/o female with PMH of HTN, HLD, ESRD on HD (M/W/F), DM-2 came to the ED complaining of low back pain. Pain has been going on for few days; described as aching, radiating to both thighs anteriorly. Patient reported chronic back pain but in the past few days it has worsened; bending forward/sitting on the side exacerbate the pain. She has no trauma to the back, fall, fever, chills, nausea, vomiting, abdominal pain, change in bowel habit, chest pain, shortness of breath.    ID called for MRi findings reporting L4-L5 discitis/OM    - no fever  - reports low back pain for at least 2 years, now worsening since last week prior to arrival  - recent flu but otherwise no infections, no dental work  - now prior infections of AVG  - has had Rt knee revision in the past but no infections  - MRI LS spine= Discitis/osteomyelitis at L4-L5 with 5 mm anterolisthesis.   - mrsa screen neg  -bcx ngtd  - TTE no vegetations  - trend ESr CRP  - Continue to observe off antibiotics for now  - s/p IR disc biopsy, f/u cultures  -  start cefepime and vancomycin with HD, monitor trough  - f/u IR cultures  - Trend Fever  - Trend Leukocytosis    d/w Dr Lemus, pharmacy  Will Follow       68 y/o female with PMH of HTN, HLD, ESRD on HD (M/W/F), DM-2 came to the ED complaining of low back pain. Pain has been going on for few days; described as aching, radiating to both thighs anteriorly. Patient reported chronic back pain but in the past few days it has worsened; bending forward/sitting on the side exacerbate the pain. She has no trauma to the back, fall, fever, chills, nausea, vomiting, abdominal pain, change in bowel habit, chest pain, shortness of breath.    ID called for MRi findings reporting L4-L5 discitis/OM    - no fever  - reports low back pain for at least 2 years, now worsening since last week prior to arrival  - recent flu but otherwise no infections, no dental work  - now prior infections of AVG  - has had Rt knee revision in the past but no infections  - MRI LS spine= Discitis/osteomyelitis at L4-L5 with 5 mm anterolisthesis.   - mrsa screen neg  -bcx ngtd  - TTE no vegetations  - trend ESr CRP  - s/p IR disc biopsy, f/u cultures  -  start cefepime and vancomycin with HD, monitor trough  - f/u IR cultures  - Trend Fever  - Trend Leukocytosis    d/w Dr Lemus, pharmacy  Will Follow

## 2025-02-26 NOTE — PROGRESS NOTE ADULT - SUBJECTIVE AND OBJECTIVE BOX
EULOGIO NIELSEN    [id number]    History: 69y Female with L4-5 discitis.  The patient continues to complain of lower back pain and b/l LE pain R>L but is improved from admission. The patient's pain is controlled using the prescribed pain medications. The patient is participating in physical therapy. Denies nausea, vomiting, chest pain, shortness of breath, abdominal pain or fever. No new complaints.    Vital Signs Last 24 Hrs  T(C): 36.7 (2025 08:15), Max: 36.9 (2025 13:38)  T(F): 98 (2025 08:15), Max: 98.4 (2025 13:38)  HR: 72 (2025 08:15) (62 - 80)  BP: 134/63 (2025 08:15) (105/71 - 134/63)  BP(mean): --  RR: 19 (2025 08:15) (18 - 19)  SpO2: 94% (2025 08:15) (92% - 100%)    Parameters below as of 2025 08:15  Patient On (Oxygen Delivery Method): room air                              9.6    5.76  )-----------( 337      ( 2025 06:55 )             32.3     02-26    132[L]  |  94[L]  |  44.0[H]  ----------------------------<  98  4.8   |  25.0  |  7.14[H]    Ca    8.9      2025 06:55        MEDICATIONS  (STANDING):  aspirin  chewable 81 milliGRAM(s) Oral daily  atorvastatin 20 milliGRAM(s) Oral at bedtime  chlorhexidine 2% Cloths 1 Application(s) Topical daily  clopidogrel Tablet 75 milliGRAM(s) Oral daily  dextrose 5%. 1000 milliLiter(s) (100 mL/Hr) IV Continuous <Continuous>  dextrose 5%. 1000 milliLiter(s) (50 mL/Hr) IV Continuous <Continuous>  dextrose 50% Injectable 25 Gram(s) IV Push once  dextrose 50% Injectable 12.5 Gram(s) IV Push once  dextrose 50% Injectable 25 Gram(s) IV Push once  epoetin dereck-epbx (RETACRIT) Injectable 14723 Unit(s) IV Push <User Schedule>  folic acid 1 milliGRAM(s) Oral daily  gabapentin 300 milliGRAM(s) Oral three times a day  glucagon  Injectable 1 milliGRAM(s) IntraMuscular once  heparin   Injectable 5000 Unit(s) SubCutaneous every 12 hours  hydrALAZINE 50 milliGRAM(s) Oral three times a day  insulin lispro (ADMELOG) corrective regimen sliding scale   SubCutaneous three times a day before meals  insulin lispro (ADMELOG) corrective regimen sliding scale   SubCutaneous at bedtime  labetalol 200 milliGRAM(s) Oral two times a day  montelukast 10 milliGRAM(s) Oral daily  senna 2 Tablet(s) Oral at bedtime    MEDICATIONS  (PRN):  acetaminophen     Tablet .. 650 milliGRAM(s) Oral every 6 hours PRN Temp greater or equal to 38C (100.4F), Mild Pain (1 - 3)  albuterol    90 MICROgram(s) HFA Inhaler 2 Puff(s) Inhalation every 6 hours PRN for shortness of breath and/or wheezing  aluminum hydroxide/magnesium hydroxide/simethicone Suspension 30 milliLiter(s) Oral every 4 hours PRN Dyspepsia  dextrose Oral Gel 15 Gram(s) Oral once PRN Blood Glucose LESS THAN 70 milliGRAM(s)/deciliter  HYDROmorphone   Tablet 2 milliGRAM(s) Oral every 6 hours PRN Moderate Pain (4 - 6)  HYDROmorphone   Tablet 4 milliGRAM(s) Oral every 6 hours PRN Severe Pain (7 - 10)  melatonin 3 milliGRAM(s) Oral at bedtime PRN Insomnia  naloxone Injectable 1 milliGRAM(s) IV Push once PRN For Opioid OD  ondansetron Injectable 4 milliGRAM(s) IV Push every 8 hours PRN Nausea and/or Vomiting      Vital Signs Last 24 Hrs  T(C): 36.7 (2025 08:15), Max: 36.9 (2025 13:38)  T(F): 98 (2025 08:15), Max: 98.4 (2025 13:38)  HR: 72 (2025 08:15) (62 - 80)  BP: 134/63 (2025 08:15) (105/71 - 134/63)  BP(mean): --  RR: 19 (2025 08:15) (18 - 19)  SpO2: 94% (2025 08:15) (92% - 100%)    Parameters below as of 2025 08:15  Patient On (Oxygen Delivery Method): room air      Daily     Daily Weight in k.7 (2025 00:00)    Appearance: Alert, responsive, in no acute distress.    Skin: no rash on visible skin. Skin is clean, dry and intact. No bleeding. No abrasions. No ulcerations.    Musculoskeletal:         Lumbar: dressing clean, dry, intact. +HV       Left Lower Extremity: Sensation is grossly intact to light touch. 2+ distal pulses. Cap refill < 2 sec.        Right Lower Extremity: Sensation is grossly intact to light touch. 2+ distal pulses. Cap refill < 2 sec.     Pathologic reflexes: [ ] Holt,  [ ]  Clonus            Reflexes:   Biceps, Bracioradialis, Patella, Achilles intact            Motor exam: [  ]             [ ] Lower extremity            HF(l2)    KE(l3)   TA(l4)  EHL(l5)    GS(s1)                                                 R        5/5        5/5        5/5       5/5         5/5                                               L         5/5        5/5       5/5       5/5          5/5    Primary Orthopedic Assessment:  •  69y Female with L4-5 discitis.    Secondary  Orthopedic Assessment(s):   •     Secondary  Medical Assessment(s):   •     Plan:   • DVT prophylaxis as prescribed, including use of compression devices and ankle pumps  • Continue physical therapy  • Out of Bed as tolerated with assistance of a walker  • Abx as per ID  • Pain control as clinically indicated  • IR aspiration performed yesterday as per ID, f/u ID final plan

## 2025-02-26 NOTE — DIETITIAN INITIAL EVALUATION ADULT - OTHER INFO
68 y/o female with PMH of HTN, HLD, ESRD on HD (M/W/F), DM-2 came to the ED complaining of low back pain.Found to have Discitis/OM of L4-5 on mri spine,ct lumbar spine.Per Ortho - Offered surgery but patient wants conservative management   IR consulted for possible biopsy - biopsy  by IR, asa/plavix needs to hold for 5 days.  #Acute on chronic low back pain   #Discitis/OM of L4-5

## 2025-02-27 LAB
ALBUMIN SERPL ELPH-MCNC: 2.9 G/DL — LOW (ref 3.3–5.2)
ALP SERPL-CCNC: 82 U/L — SIGNIFICANT CHANGE UP (ref 40–120)
ALT FLD-CCNC: 13 U/L — SIGNIFICANT CHANGE UP
ANION GAP SERPL CALC-SCNC: 13 MMOL/L — SIGNIFICANT CHANGE UP (ref 5–17)
ANION GAP SERPL CALC-SCNC: 14 MMOL/L — SIGNIFICANT CHANGE UP (ref 5–17)
APTT BLD: 31.4 SEC — SIGNIFICANT CHANGE UP (ref 24.5–35.6)
AST SERPL-CCNC: 19 U/L — SIGNIFICANT CHANGE UP
BASOPHILS # BLD AUTO: 0.03 K/UL — SIGNIFICANT CHANGE UP (ref 0–0.2)
BASOPHILS NFR BLD AUTO: 0.5 % — SIGNIFICANT CHANGE UP (ref 0–2)
BILIRUB SERPL-MCNC: 0.2 MG/DL — LOW (ref 0.4–2)
BUN SERPL-MCNC: 30.1 MG/DL — HIGH (ref 8–20)
BUN SERPL-MCNC: 31.1 MG/DL — HIGH (ref 8–20)
CALCIUM SERPL-MCNC: 8.9 MG/DL — SIGNIFICANT CHANGE UP (ref 8.4–10.5)
CALCIUM SERPL-MCNC: 8.9 MG/DL — SIGNIFICANT CHANGE UP (ref 8.4–10.5)
CHLORIDE SERPL-SCNC: 92 MMOL/L — LOW (ref 96–108)
CHLORIDE SERPL-SCNC: 94 MMOL/L — LOW (ref 96–108)
CK SERPL-CCNC: 82 U/L — SIGNIFICANT CHANGE UP (ref 25–170)
CO2 SERPL-SCNC: 25 MMOL/L — SIGNIFICANT CHANGE UP (ref 22–29)
CO2 SERPL-SCNC: 25 MMOL/L — SIGNIFICANT CHANGE UP (ref 22–29)
CREAT SERPL-MCNC: 4.91 MG/DL — HIGH (ref 0.5–1.3)
CREAT SERPL-MCNC: 5.16 MG/DL — HIGH (ref 0.5–1.3)
EGFR: 9 ML/MIN/1.73M2 — LOW
EOSINOPHIL # BLD AUTO: 0.28 K/UL — SIGNIFICANT CHANGE UP (ref 0–0.5)
EOSINOPHIL NFR BLD AUTO: 4.4 % — SIGNIFICANT CHANGE UP (ref 0–6)
GLUCOSE BLDC GLUCOMTR-MCNC: 138 MG/DL — HIGH (ref 70–99)
GLUCOSE BLDC GLUCOMTR-MCNC: 180 MG/DL — HIGH (ref 70–99)
GLUCOSE BLDC GLUCOMTR-MCNC: 197 MG/DL — HIGH (ref 70–99)
GLUCOSE BLDC GLUCOMTR-MCNC: 198 MG/DL — HIGH (ref 70–99)
GLUCOSE BLDC GLUCOMTR-MCNC: 427 MG/DL — HIGH (ref 70–99)
GLUCOSE BLDC GLUCOMTR-MCNC: 90 MG/DL — SIGNIFICANT CHANGE UP (ref 70–99)
GLUCOSE SERPL-MCNC: 143 MG/DL — HIGH (ref 70–99)
GLUCOSE SERPL-MCNC: 186 MG/DL — HIGH (ref 70–99)
HCT VFR BLD CALC: 30 % — LOW (ref 34.5–45)
HCT VFR BLD CALC: 34.6 % — SIGNIFICANT CHANGE UP (ref 34.5–45)
HGB BLD-MCNC: 10.2 G/DL — LOW (ref 11.5–15.5)
HGB BLD-MCNC: 9.1 G/DL — LOW (ref 11.5–15.5)
IMM GRANULOCYTES # BLD AUTO: 0.04 K/UL — SIGNIFICANT CHANGE UP (ref 0–0.07)
IMM GRANULOCYTES NFR BLD AUTO: 0.6 % — SIGNIFICANT CHANGE UP (ref 0–0.9)
INR BLD: 0.93 RATIO — SIGNIFICANT CHANGE UP (ref 0.85–1.16)
LYMPHOCYTES # BLD AUTO: 1.37 K/UL — SIGNIFICANT CHANGE UP (ref 1–3.3)
LYMPHOCYTES NFR BLD AUTO: 21.6 % — SIGNIFICANT CHANGE UP (ref 13–44)
MCHC RBC-ENTMCNC: 27.8 PG — SIGNIFICANT CHANGE UP (ref 27–34)
MCHC RBC-ENTMCNC: 28.1 PG — SIGNIFICANT CHANGE UP (ref 27–34)
MCHC RBC-ENTMCNC: 29.5 G/DL — LOW (ref 32–36)
MCHC RBC-ENTMCNC: 30.3 G/DL — LOW (ref 32–36)
MCV RBC AUTO: 92.6 FL — SIGNIFICANT CHANGE UP (ref 80–100)
MCV RBC AUTO: 94.3 FL — SIGNIFICANT CHANGE UP (ref 80–100)
MONOCYTES # BLD AUTO: 0.61 K/UL — SIGNIFICANT CHANGE UP (ref 0–0.9)
MONOCYTES NFR BLD AUTO: 9.6 % — SIGNIFICANT CHANGE UP (ref 2–14)
NEUTROPHILS # BLD AUTO: 4.02 K/UL — SIGNIFICANT CHANGE UP (ref 1.8–7.4)
NEUTROPHILS NFR BLD AUTO: 63.3 % — SIGNIFICANT CHANGE UP (ref 43–77)
NRBC # BLD AUTO: 0 K/UL — SIGNIFICANT CHANGE UP (ref 0–0)
NRBC # BLD AUTO: 0 K/UL — SIGNIFICANT CHANGE UP (ref 0–0)
NRBC # FLD: 0 K/UL — SIGNIFICANT CHANGE UP (ref 0–0)
NRBC # FLD: 0 K/UL — SIGNIFICANT CHANGE UP (ref 0–0)
NRBC BLD AUTO-RTO: 0 /100 WBCS — SIGNIFICANT CHANGE UP (ref 0–0)
NRBC BLD AUTO-RTO: 0 /100 WBCS — SIGNIFICANT CHANGE UP (ref 0–0)
PLATELET # BLD AUTO: 329 K/UL — SIGNIFICANT CHANGE UP (ref 150–400)
PLATELET # BLD AUTO: 347 K/UL — SIGNIFICANT CHANGE UP (ref 150–400)
PMV BLD: 10.2 FL — SIGNIFICANT CHANGE UP (ref 7–13)
PMV BLD: 10.4 FL — SIGNIFICANT CHANGE UP (ref 7–13)
POTASSIUM SERPL-MCNC: 4.5 MMOL/L — SIGNIFICANT CHANGE UP (ref 3.5–5.3)
POTASSIUM SERPL-MCNC: 4.6 MMOL/L — SIGNIFICANT CHANGE UP (ref 3.5–5.3)
POTASSIUM SERPL-SCNC: 4.5 MMOL/L — SIGNIFICANT CHANGE UP (ref 3.5–5.3)
POTASSIUM SERPL-SCNC: 4.6 MMOL/L — SIGNIFICANT CHANGE UP (ref 3.5–5.3)
PROT SERPL-MCNC: 5.9 G/DL — LOW (ref 6.6–8.7)
PROTHROM AB SERPL-ACNC: 10.8 SEC — SIGNIFICANT CHANGE UP (ref 9.9–13.4)
RBC # BLD: 3.24 M/UL — LOW (ref 3.8–5.2)
RBC # BLD: 3.67 M/UL — LOW (ref 3.8–5.2)
RBC # FLD: 16.7 % — HIGH (ref 10.3–14.5)
RBC # FLD: 16.7 % — HIGH (ref 10.3–14.5)
SODIUM SERPL-SCNC: 130 MMOL/L — LOW (ref 135–145)
SODIUM SERPL-SCNC: 133 MMOL/L — LOW (ref 135–145)
TROPONIN T, HIGH SENSITIVITY RESULT: 90 NG/L — HIGH (ref 0–51)
TROPONIN T, HIGH SENSITIVITY RESULT: 98 NG/L — HIGH (ref 0–51)
WBC # BLD: 6.35 K/UL — SIGNIFICANT CHANGE UP (ref 3.8–10.5)
WBC # BLD: 6.6 K/UL — SIGNIFICANT CHANGE UP (ref 3.8–10.5)
WBC # FLD AUTO: 6.35 K/UL — SIGNIFICANT CHANGE UP (ref 3.8–10.5)
WBC # FLD AUTO: 6.6 K/UL — SIGNIFICANT CHANGE UP (ref 3.8–10.5)

## 2025-02-27 PROCEDURE — 76942 ECHO GUIDE FOR BIOPSY: CPT | Mod: 26,59

## 2025-02-27 PROCEDURE — 70450 CT HEAD/BRAIN W/O DYE: CPT | Mod: 26

## 2025-02-27 PROCEDURE — 76937 US GUIDE VASCULAR ACCESS: CPT | Mod: 26

## 2025-02-27 PROCEDURE — 93010 ELECTROCARDIOGRAM REPORT: CPT

## 2025-02-27 PROCEDURE — 36556 INSERT NON-TUNNEL CV CATH: CPT

## 2025-02-27 PROCEDURE — 36410 VNPNXR 3YR/> PHY/QHP DX/THER: CPT | Mod: 59

## 2025-02-27 PROCEDURE — 99291 CRITICAL CARE FIRST HOUR: CPT

## 2025-02-27 RX ADMIN — FOLIC ACID 1 MILLIGRAM(S): 1 TABLET ORAL at 11:30

## 2025-02-27 RX ADMIN — Medication 2 TABLET(S): at 21:57

## 2025-02-27 RX ADMIN — ATORVASTATIN CALCIUM 20 MILLIGRAM(S): 80 TABLET, FILM COATED ORAL at 21:57

## 2025-02-27 RX ADMIN — Medication 650 MILLIGRAM(S): at 23:05

## 2025-02-27 RX ADMIN — Medication 650 MILLIGRAM(S): at 12:00

## 2025-02-27 RX ADMIN — Medication 650 MILLIGRAM(S): at 11:31

## 2025-02-27 RX ADMIN — Medication 1 APPLICATION(S): at 11:53

## 2025-02-27 RX ADMIN — HEPARIN SODIUM 5000 UNIT(S): 1000 INJECTION INTRAVENOUS; SUBCUTANEOUS at 21:57

## 2025-02-27 RX ADMIN — Medication 50 MILLIGRAM(S): at 05:40

## 2025-02-27 RX ADMIN — HEPARIN SODIUM 5000 UNIT(S): 1000 INJECTION INTRAVENOUS; SUBCUTANEOUS at 11:31

## 2025-02-27 RX ADMIN — Medication 650 MILLIGRAM(S): at 22:05

## 2025-02-27 RX ADMIN — Medication 81 MILLIGRAM(S): at 11:31

## 2025-02-27 RX ADMIN — LABETALOL HYDROCHLORIDE 200 MILLIGRAM(S): 200 TABLET, FILM COATED ORAL at 05:40

## 2025-02-27 RX ADMIN — INSULIN LISPRO 1: 100 INJECTION, SOLUTION INTRAVENOUS; SUBCUTANEOUS at 11:35

## 2025-02-27 RX ADMIN — CLOPIDOGREL BISULFATE 75 MILLIGRAM(S): 75 TABLET, FILM COATED ORAL at 11:30

## 2025-02-27 RX ADMIN — LABETALOL HYDROCHLORIDE 200 MILLIGRAM(S): 200 TABLET, FILM COATED ORAL at 15:27

## 2025-02-27 RX ADMIN — GABAPENTIN 300 MILLIGRAM(S): 400 CAPSULE ORAL at 05:40

## 2025-02-27 RX ADMIN — Medication 50 MILLIGRAM(S): at 15:26

## 2025-02-27 RX ADMIN — MONTELUKAST SODIUM 10 MILLIGRAM(S): 10 TABLET ORAL at 11:31

## 2025-02-27 RX ADMIN — GABAPENTIN 300 MILLIGRAM(S): 400 CAPSULE ORAL at 21:57

## 2025-02-27 NOTE — CONSULT NOTE ADULT - CONSULT REQUESTED DATE/TIME
15-Feb-2025 16:13
18-Feb-2025 14:17
15-Feb-2025 21:16
16-Feb-2025 17:19
18-Feb-2025 14:08
27-Feb-2025 13:46
27-Feb-2025 15:31
20-Feb-2025 15:37

## 2025-02-27 NOTE — CONSULT NOTE ADULT - SUBJECTIVE AND OBJECTIVE BOX
STROKE CODE NOTE                             [facility] Stroke Attending Note  CC: Difficulty speaking  HPI: 70 y/o F with hx of prior stroke with residual R facial droop, ESRD on HD, HTN, HLD, DM admitted for low back pain who had increased R facial droop and "difficulty thinking of words."  Stroke code activated. Nursing also noted patient to be possibly uncoordinated in the right arm.  Patient denies any weakness or paresthesias.  States prior stroke she had was in 2008, had difficulty speaking and R sided weakness. States has residual R facial weakness.    Stroke risk factors:    PAST MEDICAL & SURGICAL HISTORY:  CAD (coronary artery disease)      CKD (chronic kidney disease) requiring chronic dialysis      Type 2 diabetes mellitus      Diabetic retinopathy associated with diabetes mellitus due to underlying condition      Hypertension      HLD (hyperlipidemia)      CVA (cerebral vascular accident)        Asthma      S/P CABG (coronary artery bypass graft)      S/P coronary artery stent placement  (1)       S/P cataract extraction  OS with retained lens fragment 7/15/15      Acute hemodialysis patient  nothing in the left arm awaiting AV fistula placement - temporary cath in right upper chest quadrant      S/P  section      S/P cholecystectomy          MEDICATIONS  (STANDING):  aspirin  chewable 81 milliGRAM(s) Oral daily  atorvastatin 20 milliGRAM(s) Oral at bedtime  cefepime  Injectable. 2000 milliGRAM(s) IV Push <User Schedule>  chlorhexidine 2% Cloths 1 Application(s) Topical daily  clopidogrel Tablet 75 milliGRAM(s) Oral daily  dextrose 5%. 1000 milliLiter(s) (100 mL/Hr) IV Continuous <Continuous>  dextrose 5%. 1000 milliLiter(s) (50 mL/Hr) IV Continuous <Continuous>  dextrose 50% Injectable 25 Gram(s) IV Push once  dextrose 50% Injectable 12.5 Gram(s) IV Push once  dextrose 50% Injectable 25 Gram(s) IV Push once  epoetin dereck-epbx (RETACRIT) Injectable 22441 Unit(s) IV Push <User Schedule>  folic acid 1 milliGRAM(s) Oral daily  gabapentin 300 milliGRAM(s) Oral three times a day  glucagon  Injectable 1 milliGRAM(s) IntraMuscular once  heparin   Injectable 5000 Unit(s) SubCutaneous every 12 hours  hydrALAZINE 50 milliGRAM(s) Oral three times a day  insulin lispro (ADMELOG) corrective regimen sliding scale   SubCutaneous three times a day before meals  insulin lispro (ADMELOG) corrective regimen sliding scale   SubCutaneous at bedtime  labetalol 200 milliGRAM(s) Oral two times a day  montelukast 10 milliGRAM(s) Oral daily  senna 2 Tablet(s) Oral at bedtime  vancomycin  IVPB 1000 milliGRAM(s) IV Intermittent <User Schedule>    MEDICATIONS  (PRN):  acetaminophen     Tablet .. 650 milliGRAM(s) Oral every 6 hours PRN Temp greater or equal to 38C (100.4F), Mild Pain (1 - 3)  albuterol    90 MICROgram(s) HFA Inhaler 2 Puff(s) Inhalation every 6 hours PRN for shortness of breath and/or wheezing  aluminum hydroxide/magnesium hydroxide/simethicone Suspension 30 milliLiter(s) Oral every 4 hours PRN Dyspepsia  dextrose Oral Gel 15 Gram(s) Oral once PRN Blood Glucose LESS THAN 70 milliGRAM(s)/deciliter  melatonin 3 milliGRAM(s) Oral at bedtime PRN Insomnia  naloxone Injectable 1 milliGRAM(s) IV Push once PRN For Opioid OD  ondansetron Injectable 4 milliGRAM(s) IV Push every 8 hours PRN Nausea and/or Vomiting      Allergies    lip swelling with lobster (Swelling)  erythromycin (Vomiting)  shellfish (Swelling; Hives)    Intolerances        SOCIAL HISTORY:  no tob,   no alcohol   no drugs    FAMILY HISTORY:  FHx: breast cancer (Sibling)          ROS: 14 point ROS negative other than what is present in HPI or below    Vital Signs Last 24 Hrs  T(C): 36.4 (2025 12:49), Max: 36.9 (2025 21:28)  T(F): 97.6 (2025 12:49), Max: 98.5 (2025 21:28)  HR: 74 (2025 12:49) (69 - 87)  BP: 156/89 (2025 12:49) (98/60 - 173/50)  BP(mean): --  RR: 19 (2025 12:49) (17 - 19)  SpO2: 94% (2025 12:49) (92% - 99%)    Parameters below as of 2025 12:49  Patient On (Oxygen Delivery Method): room air          Physical Exam:  General: No acute distress.   HEENT: Head normocephalic, atraumatic. Conjunctivae clear w/o exudates or hemorrhage. Sclera non-icteric. Nares are patent bilaterally. Mucous membranes pink and moist.  No tonsillar swelling or exudates.    Neck: Supple, no adenopathy. Trachea midline. No JVD.  Cardiac: Normal rate and rhythm. S1, S2 auscultated. No murmurs, gallops, or rubs.     Respiratory: Lung sounds clear in all fields. Chest wall symmetric, nontender.   Abdominal: Soft, nondistended, nontender. Bowel sounds normoactive x 4 quadrants. No masses, hepatomegaly, or splenomegaly.   Skin: Skin is warm, dry and intact without rashes or lesions. Appropriate color for ethnicity. Nailbeds pink with no cyanosis or clubbing.   Extremities: No edema, 2+ peripheral pulses in b/l upper and b/l lower extremities. Full range of motion in all joints.     Detailed Neurologic Exam:    Mental status: The patient is awake and alert and has normal attention span.  The patient is fully oriented in 3 spheres. The patient is oriented to current events. The patient is able to name objects, follow commands, repeat sentences.    Cranial nerves: Pupils equal and react symmetrically to light. There is no visual field deficit to confrontation. Extraocular motion is full with no nystagmus. There is no ptosis. Facial sensation is intact. Mild R facial assymetry, Palate elevates symmetrically. Tongue is midline.    Motor: There is normal bulk and tone.  There is no tremor.  Strength is 5/5 in the right arm and leg.   Strength is 5/5 in the left arm and leg.  + Asterixis bilaterally, + myoclonic jerks, intermittent irregular of both shoulders    Sensation: Intact to light touch and pin in 4 extremities    Reflexes: 1-2+ throughout and plantar responses are flexor.    Cerebellar: There is no dysmetria on finger to nose testing.    Gait : deferred    NIH SS:  DATE:  TIME:  1A: Level of consciousness (0-3):   1B: Questions (0-2):   1C: Commands (0-2):   2: Gaze (0-2):   3: Visual fields (0-3):   4: Facial palsy (0-3): 1  MOTOR:  5A: Left arm motor drift (0-4):   5B: Right arm motor drift (0-4):   6A: Left leg motor drift (0-4):   6B: Right leg motor drift (0-4):   7: Limb ataxia (0-2):   SENSORY:  8: Sensation (0-2):   SPEECH:  9: Language (0-3):   10: Dysarthria (0-2):   EXTINCTION:  11: Extinction/inattention (0-2):     TOTAL SCORE: 1    prehospital mRS=      LABS:                         9.1    6.35  )-----------( 329      ( 2025 11:00 )             30.0           130[L]  |  92[L]  |  31.1[H]  ----------------------------<  186[H]  4.5   |  25.0  |  5.16[H]    Ca    8.9      2025 11:00    TPro  5.9[L]  /  Alb  2.9[L]  /  TBili  0.2[L]  /  DBili  x   /  AST  19  /  ALT  13  /  AlkPhos  82        PT/INR - ( 2025 11:00 )   PT: 10.8 sec;   INR: 0.93 ratio         PTT - ( 2025 11:00 )  PTT:31.4 sec        A1C:         RADIOLOGY & ADDITIONAL STUDIES (independently reviewed unless otherwise noted):  < from: CT Brain Stroke Protocol (25 @ 11:15) >   Mild chronic microvascular changes without evidence of an   acute transcortical infarction or hemorrhage.    < end of copied text >

## 2025-02-27 NOTE — CONSULT NOTE ADULT - SUBJECTIVE AND OBJECTIVE BOX
[place] [facility] - [facility], [facility]                                   [address] [place] [address]                                                      PHONE: [phone]                                                         FAX: [phone]  [email or web address]  ---------------------------------------------------------------------------------------------------------------------------------    Reason for Consult: = trop     HPI:  EULOGIO NIELSEN is an 69y Female with PMH of CAD/MI s/p CABG at [facility] PET CT 2023 ischemia in the anterior wall s/p cath 2023 open grafts and left subclavian stent/diagonal branches closed PVD s/p TKR HLD HTN, HLD, ESRD on HD (M/W/F), DM-2 came to the [facility] complaining of low back pain fFound to have Discitis/OM of L4-5 on mri spine lumbar was seen by Ortho and offered surgery but patient wants conservative management.  IR consulted for possible biopsy - biopsy  by IR, asa/plavix needs to hold for 5 days and now with AMS confusion called for + trop without any chest pain orthopnea or PND.     PAST MEDICAL & SURGICAL HISTORY:  CAD (coronary artery disease)      CKD (chronic kidney disease) requiring chronic dialysis      Type 2 diabetes mellitus      Diabetic retinopathy associated with diabetes mellitus due to underlying condition      Hypertension      HLD (hyperlipidemia)      CVA (cerebral vascular accident)        Asthma      S/P CABG (coronary artery bypass graft)      S/P coronary artery stent placement  (1)       S/P cataract extraction  OS with retained lens fragment 7/15/15      Acute hemodialysis patient  nothing in the left arm awaiting AV fistula placement - temporary cath in right upper chest quadrant      S/P  section      S/P cholecystectomy          lip swelling with lobster (Swelling)  erythromycin (Vomiting)  shellfish (Swelling; Hives)      MEDICATIONS  (STANDING):  aspirin  chewable 81 milliGRAM(s) Oral daily  atorvastatin 20 milliGRAM(s) Oral at bedtime  cefepime  Injectable. 2000 milliGRAM(s) IV Push <User Schedule>  chlorhexidine 2% Cloths 1 Application(s) Topical daily  clopidogrel Tablet 75 milliGRAM(s) Oral daily  dextrose 5%. 1000 milliLiter(s) (100 mL/Hr) IV Continuous <Continuous>  dextrose 5%. 1000 milliLiter(s) (50 mL/Hr) IV Continuous <Continuous>  dextrose 50% Injectable 25 Gram(s) IV Push once  dextrose 50% Injectable 12.5 Gram(s) IV Push once  dextrose 50% Injectable 25 Gram(s) IV Push once  epoetin dereck-epbx (RETACRIT) Injectable 53202 Unit(s) IV Push <User Schedule>  folic acid 1 milliGRAM(s) Oral daily  gabapentin 300 milliGRAM(s) Oral three times a day  glucagon  Injectable 1 milliGRAM(s) IntraMuscular once  heparin   Injectable 5000 Unit(s) SubCutaneous every 12 hours  hydrALAZINE 50 milliGRAM(s) Oral three times a day  insulin lispro (ADMELOG) corrective regimen sliding scale   SubCutaneous three times a day before meals  insulin lispro (ADMELOG) corrective regimen sliding scale   SubCutaneous at bedtime  labetalol 200 milliGRAM(s) Oral two times a day  montelukast 10 milliGRAM(s) Oral daily  senna 2 Tablet(s) Oral at bedtime  vancomycin  IVPB 1000 milliGRAM(s) IV Intermittent <User Schedule>    MEDICATIONS  (PRN):  acetaminophen     Tablet .. 650 milliGRAM(s) Oral every 6 hours PRN Temp greater or equal to 38C (100.4F), Mild Pain (1 - 3)  albuterol    90 MICROgram(s) HFA Inhaler 2 Puff(s) Inhalation every 6 hours PRN for shortness of breath and/or wheezing  aluminum hydroxide/magnesium hydroxide/simethicone Suspension 30 milliLiter(s) Oral every 4 hours PRN Dyspepsia  dextrose Oral Gel 15 Gram(s) Oral once PRN Blood Glucose LESS THAN 70 milliGRAM(s)/deciliter  melatonin 3 milliGRAM(s) Oral at bedtime PRN Insomnia  naloxone Injectable 1 milliGRAM(s) IV Push once PRN For Opioid OD  ondansetron Injectable 4 milliGRAM(s) IV Push every 8 hours PRN Nausea and/or Vomiting      Social History:  Cigarettes:       ex smoker              Alchohol:        none         Illicit Drug Abuse:  none    ROS: Negative other than as mentioned in HPI.    Vital Signs Last 24 Hrs  T(C): 36.4 (2025 12:49), Max: 36.9 (2025 21:28)  T(F): 97.6 (2025 12:49), Max: 98.5 (2025 21:28)  HR: 74 (2025 12:49) (69 - 87)  BP: 156/89 (2025 12:49) (98/60 - 173/50)  BP(mean): --  RR: 19 (2025 12:49) (17 - 19)  SpO2: 94% (2025 12:49) (92% - 99%)    Parameters below as of 2025 12:49  Patient On (Oxygen Delivery Method): room air      ICU Vital Signs Last 24 Hrs  EULOGIO NIELSEN  I&O's Detail    2025 07:01  -  2025 07:00  --------------------------------------------------------  IN:    Oral Fluid: 350 mL  Total IN: 350 mL    OUT:    Other (mL): 0 mL    Voided (mL): 0 mL  Total OUT: 0 mL    Total NET: 350 mL        I&O's Summary    2025 07:01  -  2025 07:00  --------------------------------------------------------  IN: 350 mL / OUT: 0 mL / NET: 350 mL      Drug Dosing Weight  EULOGIO NIELSEN      PHYSICAL EXAM:  General: Appears well developed, alert and cooperative.  HEENT: Head; normocephalic, atraumatic.  Eyes: Pupils reactive, cornea wnl.  Neck: Supple, no nodes adenopathy, no NVD or carotid bruit or thyromegaly.  CARDIOVASCULAR: Normal S1 and S2, No murmur, rub, gallop or lift.   LUNGS: No rales, rhonchi or wheeze. Normal breath sounds bilaterally.  ABDOMEN: Soft, nontender without mass or organomegaly. bowel sounds normoactive.  EXTREMITIES: No clubbing, cyanosis or edema. Distal pulses wnl.   SKIN: warm and dry with normal turgor.  NEURO: Alert/oriented x 3  PSYCH: normal affect.        LABS:                        9.1    6.35  )-----------( 329      ( 2025 11:00 )             30.0         130[L]  |  92[L]  |  31.1[H]  ----------------------------<  186[H]  4.5   |  25.0  |  5.16[H]    Ca    8.9      2025 11:00    TPro  5.9[L]  /  Alb  2.9[L]  /  TBili  0.2[L]  /  DBili  x   /  AST  19  /  ALT  13  /  AlkPhos  82      EULOGIO NIELSEN      PT/INR - ( 2025 11:00 )   PT: 10.8 sec;   INR: 0.93 ratio         PTT - ( 2025 11:00 )  PTT:31.4 sec  Urinalysis Basic - ( 2025 11:00 )    Color: x / Appearance: x / SG: x / pH: x  Gluc: 186 mg/dL / Ketone: x  / Bili: x / Urobili: x   Blood: x / Protein: x / Nitrite: x   Leuk Esterase: x / RBC: x / WBC x   Sq Epi: x / Non Sq Epi: x / Bacteria: x        RADIOLOGY & ADDITIONAL STUDIES:    INTERPRETATION OF TELEMETRY (personally reviewed):    ECG:    < from: 12 Lead ECG (25 @ 12:23) >  Diagnosis Line Sinus rhythm with sinus arrhythmia with 1st degree A-V block  Incomplete right bundle branch block  Nonspecific T wave abnormality  Abnormal ECG    Confirmed by Kam Thorne DO () on 2025 12:53:56 PM    < end of copied text >      ECHO:  < from: TTE W or WO Ultrasound Enhancing Agent (25 @ 15:26) >     CONCLUSIONS:      1. Left ventricular systolic function is normal.   2. There is normal LV mass and concentric remodeling.   3. There is moderate (grade 2) left ventricular diastolic dysfunction, with elevated left ventricular filling pressure.   4. Normal right ventricular systolic function. Tricuspid annular plane systolic excursion (TAPSE) is 2.3 cm (normal >=1.7 cm).   5. Left atrium is severely dilated.   6. The right atrium is mildly dilated.   7. Mildly dilated main pulmonary artery.   8. Trileaflet aortic valve with normal systolic excursion.   9. There is mild calcification of the mitral valve annulus.  10. Trace mitral regurgitation.    < end of copied text >        < from: CT Brain Stroke Protocol (25 @ 11:15) >  TECHNIQUE: Noncontrast CT of the head. Multiplanar reformations are   submitted.    FINDINGS:  chronic right occipital lobe infarction.  There is periventricular and subcortical white matter hypodensity without   mass effect, nonspecific, likely representing mild chronic microvascular   ischemic changes. There is no compelling evidence for an acute   transcortical infarction. There is no evidence of mass, mass effect,   midline shift or extra-axial fluid collection. The lateral ventricles and   cortical sulci are age-appropriate in sizeand configuration. The patient   is status post bilateral ocular lens replacement surgery. The orbits,   mastoid air cells and visualized paranasal sinuses are unremarkable. The   calvarium is intact. Consider MRI as clinically warranted.    IMPRESSION:  Mild chronic microvascular changes without evidence of an   acute transcortical infarction or hemorrhage. Findings discussed with ACP   Ms. Ray    --- End of Report ---    < end of copied text >      Assessment and Plan:  In summary, EULOGIO NIELSEN is an 69y Female with past medical history significant for CAD/MI s/p CABG at [facility] PET CT 2023 ischemia in the anterior wall s/p cath 2023 open grafts and left subclavian stent/diagonal branches closed PVD s/p TKR HLD HTN, HLD, ESRD on HD (M/W/F), DM-2 came to the [facility] complaining of low back pain fFound to have Discitis/OM of L4-5 on mri spine lumbar was seen by Ortho and offered surgery but patient wants conservative management.  IR consulted for possible biopsy - biopsy  by IR, asa/plavix needs to hold for 5 days and now with AMS confusion called for + trop without any chest pain orthopnea or PND.       + trop due to renal clearness and not C/W with ACS     TTE stable see above normal EF     CT head stable see above     Plan  Awaiting brain MRI/MRI  Awaiting Carotid US   Continue DAPT   [id number] current CV medications   HD as per renal

## 2025-02-27 NOTE — CONSULT NOTE ADULT - CONSULT REASON
ESRD on HD
Pain management
right total knee arthroplasty chronic patellar dislocation
Difficulty speaking
Low back pain
discitis
lumbar Discitis
+ trop

## 2025-02-27 NOTE — CHART NOTE - NSCHARTNOTEFT_GEN_A_CORE
RRT called, changed to Code stroke. Stroke team/ Neurology attending in attendance. See Code stroke note for assessment and plan.

## 2025-02-27 NOTE — PROGRESS NOTE ADULT - ASSESSMENT
70 y/o female with PMH of HTN, HLD, ESRD on HD (M/W/F), DM-2 came to the ED complaining of low back pain.Found to have Discitis/OM of L4-5 on mri spine,ct lumbar spine.Per Ortho - Offered surgery but patient wants conservative management   IR consulted for possible biopsy - biopsy  by IR, asa/plavix needs to hold for 5 days.    #Acute on chronic low back pain   #Discitis/OM of L4-5  S/P Biopsy by IR on 02/25  start cefepime and vancomycin with HD, monitor trough Per ID   f/u biopsy ngtd. f/u id rec about abx duration. ABX WITH hd  CT lumbar degenerative disc changes at L4-L5 concerning for discitis/OM   MRI with OM/Discitis of L4/5  Blood cx neg    Pain management following   Per Ortho - Offered surgery but patient wants conservative management   PT eval HOME W/ASSIST  DW SW for safe discharge.       #Right knee pain   XR knee: lateral prosthetic patellar dislocation  Seen by ortho  Pain control . Seen by pain mx  PT eval     #ESRD   On HD (M/W/F)   Nephrology following     #HTN/HLD/CAD   Aspirin /plavix 75mg    Simvastatin 40mg   Labetalol 200mg bid   Hydralazine 50mg       #Hyperglycemia with DM-2   Patient not on medication   HbA1C: 5.5 (07/24/23)   Insulin sliding scale   A1C of 6.9      DVT prophylaxis: Heparin sc        Dispo:  Pending final biopsy result. PT eval home/assist. pt wants [facility]  DW pt  DW  for safe discharge    Code stroke called around 11am  pt ,unable to express works ,rt facial drop, ataxia   Pt is currently aaox3. mild rt facial droop. no acute motor/sensory changed. see code stroke notes  stat CTH no acute stroke  trop 90--hx ckd. ekg,repeat trop. c/s cardiology. No cp  Neuro team rec MR I BRAIN, mra BRAIN/NECK W/O Contrast  NEURO CHECK Q4HR. TTE done this admission,

## 2025-02-27 NOTE — CHART NOTE - NSCHARTNOTEFT_GEN_A_CORE
RAPID RESPONSE/Code Stroke Follow up    Patient seen and examined at bedside. RR called @ 1100 changed to Code Stroke.     PHYSICAL EXAM ----------  Vital Signs Last 24 Hrs  T(C): 36.4 (27 Feb 2025 12:49), Max: 36.9 (26 Feb 2025 21:28)  T(F): 97.6 (27 Feb 2025 12:49), Max: 98.5 (26 Feb 2025 21:28)  HR: 74 (27 Feb 2025 12:49) (69 - 87)  BP: 156/89 (27 Feb 2025 12:49) (98/60 - 173/50)  BP(mean): --  RR: 19 (27 Feb 2025 12:49) (17 - 19)  SpO2: 94% (27 Feb 2025 12:49) (92% - 99%)    Parameters below as of 27 Feb 2025 12:49  Patient On (Oxygen Delivery Method): room air        PHYSICAL EXAM.    GEN - appears age appropriate. well nourished. pleasant. no distress.   HEENT - NCAT, EOMI, YAIR, no JVD/bruit.  RESP - CTA BL, no wheeze/stridor/rhonchi/crackles. not on supplemental O2. no accessory muscle use.  CARDIO - NS1S2, RRR. No murmurs/rubs/gallops.  ABD - Soft/Non tender/Non distended. Normal BS x4 quadrants.   Ext - No EDITH.  MSK - BL 5/5 strength on upper and lower extremities.   Neuro - AAOx3.     I&O's Summary    26 Feb 2025 07:01  -  27 Feb 2025 07:00  --------------------------------------------------------  IN: 350 mL / OUT: 0 mL / NET: 350 mL        LABS ----------                        9.1    6.35  )-----------( 329      ( 27 Feb 2025 11:00 )             30.0       02-27    130[L]  |  92[L]  |  31.1[H]  ----------------------------<  186[H]  4.5   |  25.0  |  5.16[H]    Ca    8.9      27 Feb 2025 11:00    TPro  5.9[L]  /  Alb  2.9[L]  /  TBili  0.2[L]  /  DBili  x   /  AST  19  /  ALT  13  /  AlkPhos  82  02-27      LIVER FUNCTIONS - ( 27 Feb 2025 11:00 )  Alb: 2.9 g/dL / Pro: 5.9 g/dL / ALK PHOS: 82 U/L / ALT: 13 U/L / AST: 19 U/L / GGT: x             CAPILLARY BLOOD GLUCOSE      POCT Blood Glucose.: 180 mg/dL (27 Feb 2025 10:59)  POCT Blood Glucose.: 197 mg/dL (27 Feb 2025 10:48)  POCT Blood Glucose.: 138 mg/dL (27 Feb 2025 08:29)  POCT Blood Glucose.: 218 mg/dL (26 Feb 2025 21:38)  POCT Blood Glucose.: 98 mg/dL (26 Feb 2025 16:59)      PT/INR - ( 27 Feb 2025 11:00 )   PT: 10.8 sec;   INR: 0.93 ratio         PTT - ( 27 Feb 2025 11:00 )  PTT:31.4 sec    Urinalysis Basic - ( 27 Feb 2025 11:00 )    Color: x / Appearance: x / SG: x / pH: x  Gluc: 186 mg/dL / Ketone: x  / Bili: x / Urobili: x   Blood: x / Protein: x / Nitrite: x   Leuk Esterase: x / RBC: x / WBC x   Sq Epi: x / Non Sq Epi: x / Bacteria: x          Culture - Acid Fast - Other w/Smear (collected 25 Feb 2025 11:27)  Source: .Other Other, L4-L5 DISC  Preliminary Report (26 Feb 2025 23:08):    Culture is being performed.    Culture - Fungal, Other (collected 25 Feb 2025 11:27)  Source: .Other Other, L4-L5 DISC  Preliminary Report (26 Feb 2025 23:03):    Culture is being performed. Fungal cultures are held for 4 weeks.    Culture - Aspirate with Gram Stain (collected 25 Feb 2025 11:27)  Source: Aspirate Other, L4-L5 DISC  Gram Stain (26 Feb 2025 00:40):    No polymorphonuclear cells seen per low power field    No organisms seen per oil power field  Preliminary Report (26 Feb 2025 18:12):    No growth                   IMAGING ----------      ASSESSMENT/ PLAN ----------    [INTRO STATEMENT] sp Rapid Response for - .         DISPOSITION:  - Maintain current level of care  - Transfer to Telemetry  - Transfer to Stepdown  - Transfer to MICU/ CT ICU/ SICU/ Neuro ICU RAPID RESPONSE/Code Stroke Follow up    Patient seen and examined at bedside. RR called @ 1100 changed to Code Stroke.     PHYSICAL EXAM ----------  Vital Signs Last 24 Hrs  T(C): 36.4 (27 Feb 2025 12:49), Max: 36.9 (26 Feb 2025 21:28)  T(F): 97.6 (27 Feb 2025 12:49), Max: 98.5 (26 Feb 2025 21:28)  HR: 74 (27 Feb 2025 12:49) (69 - 87)  BP: 156/89 (27 Feb 2025 12:49) (98/60 - 173/50)  BP(mean): --  RR: 19 (27 Feb 2025 12:49) (17 - 19)  SpO2: 94% (27 Feb 2025 12:49) (92% - 99%)    Parameters below as of 27 Feb 2025 12:49  Patient On (Oxygen Delivery Method): room air      PHYSICAL EXAM.  GEN - appears pleasant. no distress.   HEENT - NCAT, EOMI, YAIR, no JVD/bruit.  RESP - CTA BL, no wheeze/stridor/rhonchi/crackles. not on supplemental O2. no accessory muscle use.  CARDIO - NS1S2, RRR. No murmurs  ABD - Soft/Non tender/Non distended. Normal BS x4 quadrants.   Ext - No EDITH.  MSK - BL 5/5 strength on upper extremities 5/5 strength on LLE. 4/5 strength on RLE, pt states that this is chronic. 2/2 R knee replacement in 2023, with Dr. Simms.  Neuro - AAOx3. Mild R facial droop.  --------------------------------------------------------------------------  I&O's Summary    26 Feb 2025 07:01  -  27 Feb 2025 07:00  --------------------------------------------------------  IN: 350 mL / OUT: 0 mL / NET: 350 mL      LABS ----------                        9.1    6.35  )-----------( 329      ( 27 Feb 2025 11:00 )             30.0       02-27    130[L]  |  92[L]  |  31.1[H]  ----------------------------<  186[H]  4.5   |  25.0  |  5.16[H]    Ca    8.9      27 Feb 2025 11:00    TPro  5.9[L]  /  Alb  2.9[L]  /  TBili  0.2[L]  /  DBili  x   /  AST  19  /  ALT  13  /  AlkPhos  82  02-27      LIVER FUNCTIONS - ( 27 Feb 2025 11:00 )  Alb: 2.9 g/dL / Pro: 5.9 g/dL / ALK PHOS: 82 U/L / ALT: 13 U/L / AST: 19 U/L / GGT: x             CAPILLARY BLOOD GLUCOSE      POCT Blood Glucose.: 180 mg/dL (27 Feb 2025 10:59)  POCT Blood Glucose.: 197 mg/dL (27 Feb 2025 10:48)  POCT Blood Glucose.: 138 mg/dL (27 Feb 2025 08:29)  POCT Blood Glucose.: 218 mg/dL (26 Feb 2025 21:38)  POCT Blood Glucose.: 98 mg/dL (26 Feb 2025 16:59)      PT/INR - ( 27 Feb 2025 11:00 )   PT: 10.8 sec;   INR: 0.93 ratio         PTT - ( 27 Feb 2025 11:00 )  PTT:31.4 sec    Urinalysis Basic - ( 27 Feb 2025 11:00 )    Color: x / Appearance: x / SG: x / pH: x  Gluc: 186 mg/dL / Ketone: x  / Bili: x / Urobili: x   Blood: x / Protein: x / Nitrite: x   Leuk Esterase: x / RBC: x / WBC x   Sq Epi: x / Non Sq Epi: x / Bacteria: x      Culture - Acid Fast - Other w/Smear (collected 25 Feb 2025 11:27)  Source: .Other Other, L4-L5 DISC  Preliminary Report (26 Feb 2025 23:08):    Culture is being performed.    Culture - Fungal, Other (collected 25 Feb 2025 11:27)  Source: .Other Other, L4-L5 DISC  Preliminary Report (26 Feb 2025 23:03):    Culture is being performed. Fungal cultures are held for 4 weeks.    Culture - Aspirate with Gram Stain (collected 25 Feb 2025 11:27)  Source: Aspirate Other, L4-L5 DISC  Gram Stain (26 Feb 2025 00:40):    No polymorphonuclear cells seen per low power field    No organisms seen per oil power field  Preliminary Report (26 Feb 2025 18:12):    No growth  ---------------------------------------------------------------------------  IMAGING  < from: CT Brain Stroke Protocol (02.27.25 @ 11:15) >    ACC: [id number] EXAM:  CT BRAIN STROKE PROTOCOL   ORDERED BY: FARIDEH BANEGAS     PROCEDURE DATE:  02/27/2025      INTERPRETATION:  CLINICAL INDICATIONS:  Code Stroke    COMPARISON: Head CT dated 8/3/2021    TECHNIQUE: Noncontrast CT of the head. Multiplanar reformations are   submitted.    FINDINGS:  chronic right occipital lobe infarction.  There is periventricular and subcortical white matter hypodensity without   mass effect, nonspecific, likely representing mild chronic microvascular   ischemic changes. There is no compelling evidence for an acute   transcortical infarction. There is no evidence of mass, mass effect,   midline shift or extra-axial fluid collection. The lateral ventricles and   cortical sulci are age-appropriate in size and configuration. The patient   is status post bilateral ocular lens replacement surgery. The orbits,   mastoid air cells and visualized paranasal sinuses are unremarkable. The   calvarium is intact. Consider MRI as clinically warranted.    IMPRESSION:  Mild chronic microvascular changes without evidence of an   acute transcortical infarction or hemorrhage. Findings discussed with [facility]   Ms. Ray    --- End of Report ---    WAN KELLY MD; Attending Radiologist  This document has been electronically signed. Feb 27 2025 11:17AM    < end of copied text >    -----------------------------------------------------------------------------------  ASSESSMENT/ PLAN     68 y/o female with PMH of HTN, HLD, ESRD on HD (M/W/F), DM-2 came to the ED complaining of low back pain. Found to have Discitis/OM of L4-5 on mri spine, ct lumbar spine. Per Ortho - Offered surgery but patient wants conservative management.    AMS  - code stroke called  - Neurology attending at bedside  - CTH negative for hemorrhage  - cont telemetry  - q4hr Neuro assessment  - q4hr VS  - elevated trop --> trend trop as per cardiology. Most likely 2/2 CKD on dialysis.       sp Rapid Response for AMS. pt had ataxia and R facial droop. Pt CTH showed no acute infarction or hemorrhage. Pt remaining on floor with q4hr neuro checks instated.       DISPOSITION:  - Maintain level of care + continuous telemetry monitoring RAPID RESPONSE/Code Stroke Follow up    Patient seen and examined at bedside. RR called @ 1100 changed to Code Stroke.     PHYSICAL EXAM ----------  Vital Signs Last 24 Hrs  T(C): 36.4 (27 Feb 2025 12:49), Max: 36.9 (26 Feb 2025 21:28)  T(F): 97.6 (27 Feb 2025 12:49), Max: 98.5 (26 Feb 2025 21:28)  HR: 74 (27 Feb 2025 12:49) (69 - 87)  BP: 156/89 (27 Feb 2025 12:49) (98/60 - 173/50)  BP(mean): --  RR: 19 (27 Feb 2025 12:49) (17 - 19)  SpO2: 94% (27 Feb 2025 12:49) (92% - 99%)    Parameters below as of 27 Feb 2025 12:49  Patient On (Oxygen Delivery Method): room air      PHYSICAL EXAM.  GEN - appears pleasant. no distress.   HEENT - NCAT, EOMI, YAIR, no JVD/bruit.  RESP - CTA BL, no wheeze/stridor/rhonchi/crackles. not on supplemental O2. no accessory muscle use.  CARDIO - NS1S2, RRR. No murmurs  ABD - Soft/Non tender/Non distended. Normal BS x4 quadrants.   Ext - No EDITH.  MSK - BL 5/5 strength on upper extremities 5/5 strength on LLE. 4/5 strength on RLE, pt states that this is chronic. 2/2 R knee replacement in 2023, with Dr. Simms.  Neuro - AAOx3. Mild R facial droop.  --------------------------------------------------------------------------  I&O's Summary    26 Feb 2025 07:01  -  27 Feb 2025 07:00  --------------------------------------------------------  IN: 350 mL / OUT: 0 mL / NET: 350 mL      LABS ----------                        9.1    6.35  )-----------( 329      ( 27 Feb 2025 11:00 )             30.0       02-27    130[L]  |  92[L]  |  31.1[H]  ----------------------------<  186[H]  4.5   |  25.0  |  5.16[H]    Ca    8.9      27 Feb 2025 11:00    TPro  5.9[L]  /  Alb  2.9[L]  /  TBili  0.2[L]  /  DBili  x   /  AST  19  /  ALT  13  /  AlkPhos  82  02-27      LIVER FUNCTIONS - ( 27 Feb 2025 11:00 )  Alb: 2.9 g/dL / Pro: 5.9 g/dL / ALK PHOS: 82 U/L / ALT: 13 U/L / AST: 19 U/L / GGT: x             CAPILLARY BLOOD GLUCOSE      POCT Blood Glucose.: 180 mg/dL (27 Feb 2025 10:59)  POCT Blood Glucose.: 197 mg/dL (27 Feb 2025 10:48)  POCT Blood Glucose.: 138 mg/dL (27 Feb 2025 08:29)  POCT Blood Glucose.: 218 mg/dL (26 Feb 2025 21:38)  POCT Blood Glucose.: 98 mg/dL (26 Feb 2025 16:59)      PT/INR - ( 27 Feb 2025 11:00 )   PT: 10.8 sec;   INR: 0.93 ratio         PTT - ( 27 Feb 2025 11:00 )  PTT:31.4 sec    Urinalysis Basic - ( 27 Feb 2025 11:00 )    Color: x / Appearance: x / SG: x / pH: x  Gluc: 186 mg/dL / Ketone: x  / Bili: x / Urobili: x   Blood: x / Protein: x / Nitrite: x   Leuk Esterase: x / RBC: x / WBC x   Sq Epi: x / Non Sq Epi: x / Bacteria: x      Culture - Acid Fast - Other w/Smear (collected 25 Feb 2025 11:27)  Source: .Other Other, L4-L5 DISC  Preliminary Report (26 Feb 2025 23:08):    Culture is being performed.    Culture - Fungal, Other (collected 25 Feb 2025 11:27)  Source: .Other Other, L4-L5 DISC  Preliminary Report (26 Feb 2025 23:03):    Culture is being performed. Fungal cultures are held for 4 weeks.    Culture - Aspirate with Gram Stain (collected 25 Feb 2025 11:27)  Source: Aspirate Other, L4-L5 DISC  Gram Stain (26 Feb 2025 00:40):    No polymorphonuclear cells seen per low power field    No organisms seen per oil power field  Preliminary Report (26 Feb 2025 18:12):    No growth  ---------------------------------------------------------------------------  IMAGING  < from: CT Brain Stroke Protocol (02.27.25 @ 11:15) >    ACC: [id number] EXAM:  CT BRAIN STROKE PROTOCOL   ORDERED BY: FARIDEH BANEGAS     PROCEDURE DATE:  02/27/2025      INTERPRETATION:  CLINICAL INDICATIONS:  Code Stroke    COMPARISON: Head CT dated 8/3/2021    TECHNIQUE: Noncontrast CT of the head. Multiplanar reformations are   submitted.    FINDINGS:  chronic right occipital lobe infarction.  There is periventricular and subcortical white matter hypodensity without   mass effect, nonspecific, likely representing mild chronic microvascular   ischemic changes. There is no compelling evidence for an acute   transcortical infarction. There is no evidence of mass, mass effect,   midline shift or extra-axial fluid collection. The lateral ventricles and   cortical sulci are age-appropriate in size and configuration. The patient   is status post bilateral ocular lens replacement surgery. The orbits,   mastoid air cells and visualized paranasal sinuses are unremarkable. The   calvarium is intact. Consider MRI as clinically warranted.    IMPRESSION:  Mild chronic microvascular changes without evidence of an   acute transcortical infarction or hemorrhage. Findings discussed with [facility]   Ms. Ray    --- End of Report ---    WAN KELLY MD; Attending Radiologist  This document has been electronically signed. Feb 27 2025 11:17AM    < end of copied text >    -----------------------------------------------------------------------------------  ASSESSMENT/ PLAN     70 y/o female with PMH of HTN, HLD, ESRD on HD (M/W/F), DM-2 came to the ED complaining of low back pain. Found to have Discitis/OM of L4-5 on mri spine, ct lumbar spine. Per Ortho - Offered surgery but patient wants conservative management.    AMS  - code stroke called  - Neurology attending at bedside  - CTH negative for hemorrhage  - cont telemetry  - q4hr Neuro assessment  - q4hr VS  - elevated trop --> trend trop as per cardiology. Most likely 2/2 CKD on dialysis.   - see stroke note for further plan from neruology team.      sp Rapid Response for AMS. pt had ataxia and R facial droop. Pt CTH showed no acute infarction or hemorrhage. Pt remaining on floor with q4hr neuro checks instated.       DISPOSITION:  - Maintain level of care + continuous telemetry monitoring RAPID RESPONSE/Code Stroke Follow up    Patient seen and examined at bedside. RR called @ 1100 changed to Code Stroke.     PHYSICAL EXAM ----------  Vital Signs Last 24 Hrs  T(C): 36.4 (27 Feb 2025 12:49), Max: 36.9 (26 Feb 2025 21:28)  T(F): 97.6 (27 Feb 2025 12:49), Max: 98.5 (26 Feb 2025 21:28)  HR: 74 (27 Feb 2025 12:49) (69 - 87)  BP: 156/89 (27 Feb 2025 12:49) (98/60 - 173/50)  BP(mean): --  RR: 19 (27 Feb 2025 12:49) (17 - 19)  SpO2: 94% (27 Feb 2025 12:49) (92% - 99%)    Parameters below as of 27 Feb 2025 12:49  Patient On (Oxygen Delivery Method): room air      PHYSICAL EXAM.  GEN - appears pleasant. no distress.   HEENT - NCAT, EOMI, YAIR, no JVD/bruit.  RESP - CTA BL, no wheeze/stridor/rhonchi/crackles. not on supplemental O2. no accessory muscle use.  CARDIO - NS1S2, RRR. No murmurs  ABD - Soft/Non tender/Non distended. Normal BS x4 quadrants.   Ext - No EDITH.  MSK - BL 5/5 strength on upper extremities 5/5 strength on LLE. 4/5 strength on RLE, pt states that this is chronic. 2/2 R knee replacement in 2023, with Dr. Simms.  Neuro - AAOx3. Mild R facial droop.  --------------------------------------------------------------------------  I&O's Summary    26 Feb 2025 07:01  -  27 Feb 2025 07:00  --------------------------------------------------------  IN: 350 mL / OUT: 0 mL / NET: 350 mL      LABS ----------                        9.1    6.35  )-----------( 329      ( 27 Feb 2025 11:00 )             30.0       02-27    130[L]  |  92[L]  |  31.1[H]  ----------------------------<  186[H]  4.5   |  25.0  |  5.16[H]    Ca    8.9      27 Feb 2025 11:00    TPro  5.9[L]  /  Alb  2.9[L]  /  TBili  0.2[L]  /  DBili  x   /  AST  19  /  ALT  13  /  AlkPhos  82  02-27      LIVER FUNCTIONS - ( 27 Feb 2025 11:00 )  Alb: 2.9 g/dL / Pro: 5.9 g/dL / ALK PHOS: 82 U/L / ALT: 13 U/L / AST: 19 U/L / GGT: x             CAPILLARY BLOOD GLUCOSE      POCT Blood Glucose.: 180 mg/dL (27 Feb 2025 10:59)  POCT Blood Glucose.: 197 mg/dL (27 Feb 2025 10:48)  POCT Blood Glucose.: 138 mg/dL (27 Feb 2025 08:29)  POCT Blood Glucose.: 218 mg/dL (26 Feb 2025 21:38)  POCT Blood Glucose.: 98 mg/dL (26 Feb 2025 16:59)      PT/INR - ( 27 Feb 2025 11:00 )   PT: 10.8 sec;   INR: 0.93 ratio         PTT - ( 27 Feb 2025 11:00 )  PTT:31.4 sec    Urinalysis Basic - ( 27 Feb 2025 11:00 )    Color: x / Appearance: x / SG: x / pH: x  Gluc: 186 mg/dL / Ketone: x  / Bili: x / Urobili: x   Blood: x / Protein: x / Nitrite: x   Leuk Esterase: x / RBC: x / WBC x   Sq Epi: x / Non Sq Epi: x / Bacteria: x      Culture - Acid Fast - Other w/Smear (collected 25 Feb 2025 11:27)  Source: .Other Other, L4-L5 DISC  Preliminary Report (26 Feb 2025 23:08):    Culture is being performed.    Culture - Fungal, Other (collected 25 Feb 2025 11:27)  Source: .Other Other, L4-L5 DISC  Preliminary Report (26 Feb 2025 23:03):    Culture is being performed. Fungal cultures are held for 4 weeks.    Culture - Aspirate with Gram Stain (collected 25 Feb 2025 11:27)  Source: Aspirate Other, L4-L5 DISC  Gram Stain (26 Feb 2025 00:40):    No polymorphonuclear cells seen per low power field    No organisms seen per oil power field  Preliminary Report (26 Feb 2025 18:12):    No growth  ---------------------------------------------------------------------------  IMAGING  < from: CT Brain Stroke Protocol (02.27.25 @ 11:15) >    ACC: [id number] EXAM:  CT BRAIN STROKE PROTOCOL   ORDERED BY: FARIDEH BANEGAS     PROCEDURE DATE:  02/27/2025      INTERPRETATION:  CLINICAL INDICATIONS:  Code Stroke    COMPARISON: Head CT dated 8/3/2021    TECHNIQUE: Noncontrast CT of the head. Multiplanar reformations are   submitted.    FINDINGS:  chronic right occipital lobe infarction.  There is periventricular and subcortical white matter hypodensity without   mass effect, nonspecific, likely representing mild chronic microvascular   ischemic changes. There is no compelling evidence for an acute   transcortical infarction. There is no evidence of mass, mass effect,   midline shift or extra-axial fluid collection. The lateral ventricles and   cortical sulci are age-appropriate in size and configuration. The patient   is status post bilateral ocular lens replacement surgery. The orbits,   mastoid air cells and visualized paranasal sinuses are unremarkable. The   calvarium is intact. Consider MRI as clinically warranted.    IMPRESSION:  Mild chronic microvascular changes without evidence of an   acute transcortical infarction or hemorrhage. Findings discussed with [facility]   Ms. Ray    --- End of Report ---    WAN KELLY MD; Attending Radiologist  This document has been electronically signed. Feb 27 2025 11:17AM    < end of copied text >    -----------------------------------------------------------------------------------  ASSESSMENT/ PLAN     68 y/o female with PMH of HTN, HLD, ESRD on HD (M/W/F), DM-2 came to the ED complaining of low back pain. Found to have Discitis/OM of L4-5 on mri spine, ct lumbar spine. Per Ortho - Offered surgery but patient wants conservative management.    AMS  - code stroke called  - Neurology attending at bedside  - CTH negative for hemorrhage  - cont telemetry  - q4hr Neuro assessment  - q4hr VS  - elevated trop --> trend trop as per cardiology. Most likely 2/2 CKD on dialysis.   -f/u MRI ordered  - see stroke note for further plan from neruology team.      sp Rapid Response for AMS. pt had ataxia and R facial droop. Pt CTH showed no acute infarction or hemorrhage. Pt remaining on floor with q4hr neuro checks instated.       DISPOSITION:  - Maintain level of care + continuous telemetry monitoring

## 2025-02-27 NOTE — PROGRESS NOTE ADULT - SUBJECTIVE AND OBJECTIVE BOX
Patient is a 69y old  Female who presents with a chief complaint of Dorsalgia     (26 Feb 2025 15:27)    pt denies any pain. pt states she wants to go to [facility]. Her  has health condition, he will not able to care for him.   REVIEW OF SYSTEMS: All systems are reviewed and found to be negative except above    MEDICATIONS  (STANDING):  aspirin  chewable 81 milliGRAM(s) Oral daily  atorvastatin 20 milliGRAM(s) Oral at bedtime  cefepime  Injectable. 2000 milliGRAM(s) IV Push <User Schedule>  chlorhexidine 2% Cloths 1 Application(s) Topical daily  clopidogrel Tablet 75 milliGRAM(s) Oral daily  dextrose 5%. 1000 milliLiter(s) (100 mL/Hr) IV Continuous <Continuous>  dextrose 5%. 1000 milliLiter(s) (50 mL/Hr) IV Continuous <Continuous>  dextrose 50% Injectable 25 Gram(s) IV Push once  dextrose 50% Injectable 12.5 Gram(s) IV Push once  dextrose 50% Injectable 25 Gram(s) IV Push once  epoetin dereck-epbx (RETACRIT) Injectable 01004 Unit(s) IV Push <User Schedule>  folic acid 1 milliGRAM(s) Oral daily  gabapentin 300 milliGRAM(s) Oral three times a day  glucagon  Injectable 1 milliGRAM(s) IntraMuscular once  heparin   Injectable 5000 Unit(s) SubCutaneous every 12 hours  hydrALAZINE 50 milliGRAM(s) Oral three times a day  insulin lispro (ADMELOG) corrective regimen sliding scale   SubCutaneous three times a day before meals  insulin lispro (ADMELOG) corrective regimen sliding scale   SubCutaneous at bedtime  labetalol 200 milliGRAM(s) Oral two times a day  montelukast 10 milliGRAM(s) Oral daily  senna 2 Tablet(s) Oral at bedtime  vancomycin  IVPB 1000 milliGRAM(s) IV Intermittent <User Schedule>    MEDICATIONS  (PRN):  acetaminophen     Tablet .. 650 milliGRAM(s) Oral every 6 hours PRN Temp greater or equal to 38C (100.4F), Mild Pain (1 - 3)  albuterol    90 MICROgram(s) HFA Inhaler 2 Puff(s) Inhalation every 6 hours PRN for shortness of breath and/or wheezing  aluminum hydroxide/magnesium hydroxide/simethicone Suspension 30 milliLiter(s) Oral every 4 hours PRN Dyspepsia  dextrose Oral Gel 15 Gram(s) Oral once PRN Blood Glucose LESS THAN 70 milliGRAM(s)/deciliter  melatonin 3 milliGRAM(s) Oral at bedtime PRN Insomnia  naloxone Injectable 1 milliGRAM(s) IV Push once PRN For Opioid OD  ondansetron Injectable 4 milliGRAM(s) IV Push every 8 hours PRN Nausea and/or Vomiting      CAPILLARY BLOOD GLUCOSE      POCT Blood Glucose.: 180 mg/dL (27 Feb 2025 10:59)  POCT Blood Glucose.: 197 mg/dL (27 Feb 2025 10:48)  POCT Blood Glucose.: 138 mg/dL (27 Feb 2025 08:29)  POCT Blood Glucose.: 218 mg/dL (26 Feb 2025 21:38)  POCT Blood Glucose.: 98 mg/dL (26 Feb 2025 16:59)    I&O's Summary    26 Feb 2025 07:01  -  27 Feb 2025 07:00  --------------------------------------------------------  IN: 350 mL / OUT: 0 mL / NET: 350 mL        PHYSICAL EXAM:  Vital Signs Last 24 Hrs  T(C): 36.9 (27 Feb 2025 09:37), Max: 36.9 (26 Feb 2025 21:28)  T(F): 98.4 (27 Feb 2025 09:37), Max: 98.5 (26 Feb 2025 21:28)  HR: 76 (27 Feb 2025 09:37) (61 - 87)  BP: 121/65 (27 Feb 2025 09:37) (98/60 - 173/50)  BP(mean): --  RR: 17 (27 Feb 2025 09:37) (17 - 18)  SpO2: 95% (27 Feb 2025 09:37) (92% - 99%)    Parameters below as of 27 Feb 2025 09:37  Patient On (Oxygen Delivery Method): room air        CONSTITUTIONAL: NAD,  EYES: PERRLA; conjunctiva and sclera clear  ENMT: Moist oral mucosa,   RESPIRATORY: Normal respiratory effort; lungs are clear to auscultation bilaterally  CARDIOVASCULAR: Regular rate and rhythm, normal S1 and S2, no murmur   EXTS: No lower extremity edema; Peripheral pulses are 2+ bilaterally  ABDOMEN: Nontender to palpation, normoactive bowel sounds, no rebound/guarding;   MUSCLOSKELETAL: rt knee:  no joint swelling ,mild  tenderness to palpation. Back-mild discomfort lower back  PSYCH: affect appropriate  NEUROLOGY: A+O to person, place, and time; CN 2-12 are intact and symmetric; no gross sensory deficits;       LABS:                        9.1    6.35  )-----------( 329      ( 27 Feb 2025 11:00 )             30.0     02-27    130[L]  |  92[L]  |  31.1[H]  ----------------------------<  186[H]  4.5   |  25.0  |  5.16[H]    Ca    8.9      27 Feb 2025 11:00    TPro  5.9[L]  /  Alb  2.9[L]  /  TBili  0.2[L]  /  DBili  x   /  AST  19  /  ALT  13  /  AlkPhos  82  02-27    PT/INR - ( 27 Feb 2025 11:00 )   PT: 10.8 sec;   INR: 0.93 ratio         PTT - ( 27 Feb 2025 11:00 )  PTT:31.4 sec      Urinalysis Basic - ( 27 Feb 2025 11:00 )    Color: x / Appearance: x / SG: x / pH: x  Gluc: 186 mg/dL / Ketone: x  / Bili: x / Urobili: x   Blood: x / Protein: x / Nitrite: x   Leuk Esterase: x / RBC: x / WBC x   Sq Epi: x / Non Sq Epi: x / Bacteria: x        Culture - Acid Fast - Other w/Smear (collected 25 Feb 2025 11:27)  Source: .Other Other, L4-L5 DISC  Preliminary Report (26 Feb 2025 23:08):    Culture is being performed.    Culture - Fungal, Other (collected 25 Feb 2025 11:27)  Source: .Other Other, L4-L5 DISC  Preliminary Report (26 Feb 2025 23:03):    Culture is being performed. Fungal cultures are held for 4 weeks.    Culture - Aspirate with Gram Stain (collected 25 Feb 2025 11:27)  Source: Aspirate Other, L4-L5 DISC  Gram Stain (26 Feb 2025 00:40):    No polymorphonuclear cells seen per low power field    No organisms seen per oil power field  Preliminary Report (26 Feb 2025 18:12):    No growth        RADIOLOGY & ADDITIONAL TESTS:  Results Reviewed:

## 2025-02-27 NOTE — CONSULT NOTE ADULT - ASSESSMENT
Patient is a 69 year-old female admitted for back pain. IR consulted for L4-5 disc aspiration. Imaging reviewed by Dr. Maldonado, given that patient is on asa/plavix, will plan for disc aspiration on Tuesday 2/25. Keep patient NPO 23:59 on 2/24. HOLD asa/plavix.    Please call extension [phone] with any questions, concerns or issues regarding above. 
68 yo female with history of HTN, HLD, ESRD on HD (M/W/F), DM-2 came to ED complaints of lower back pain and lower extremity weakness. Her CT of L-spine showed destructive process centered at the L4-5 disc space with endplate   erosions, sclerosis, and grade 1 anterolisthesis, she was seen by Orthopedics, who recommended to do MRI of the spine with iv contrast to rule out discitis/osteomyelitis of the L-spine.   Nephrology was consulted for HD needs.     - ESRD on HD at [facility] unit MWF  HD access RUE AVG  Fluid status euvolemic  - HTN BP is uncontrolled, could be due to pain   - Anemia of CKD - Hb is below goal   - CKD MDB Hyperphosphatemia   - Secondary hyperparathyroidism   - Lower back pain, with desctructive lesions in her spine, rule out spinal metastasis    Plan   No indications for HD today   Please arrange for HD immediately after MRI with iv contrast   Patient will need 3 consecutive session after MRI with iv contrast   HD tomorrow 3 hrs  ml/min 3 K bath Goal UF 1-2L as tolerated by BP  Dose medications for HD   Aranesp 40 mg weekly with HD   Labs on HD days   Rest of management as per primary team   Discussed with primary attending   Will follow   Thank you  
70 y/o female with PMH of HTN, HLD, ESRD on HD (M/W/F), DM-2 came to the ED complaining of low back pain. Pain has been going on for few days; described as aching, radiating to both thighs anteriorly. Patient reported chronic back pain but in the past few days it has worsened; bending forward/sitting on the side exacerbate the pain.      Plan:     -Continue acetaminophen Tablet  975 milliGRAM(s) Oral every 8 hours  -HYDROmorphone  PO 2mg q6hrs PRN for moderate pain  -HYDROmorphone PO 4mg q6hrs for severe pain   -HYDROmorphone  Injectable 0.5 milliGRAM(s) IV Push every 4 hours PRN for BTP
68 y/o female with PMH of HTN, HLD, ESRD on HD (M/W/F), DM-2 came to the ED complaining of low back pain. Pain has been going on for few days; described as aching, radiating to both thighs anteriorly. Patient reported chronic back pain but in the past few days it has worsened; bending forward/sitting on the side exacerbate the pain. She has no trauma to the back, fall, fever, chills, nausea, vomiting, abdominal pain, change in bowel habit, chest pain, shortness of breath.    ID called for MRi findings reporting L4-L5 discitis/OM    - no fever  - reports low back pain for at least 2 years, now worsening since last week  - recent flu but otherwise no infections, no dental work  - now prior infections of AVG  - has had Rt knee revision in the past but no infections  - MRI LS spine= Discitis/osteomyelitis at L4-L5 with 5 mm anterolisthesis.   - mrsa screen neg  - f/u bcx, repeat x 2  - trend ESr CRP  - Continue to observe off antibiotics for now  - ortho f/u  - would obtain tissue for cultures and path  - Trend Fever  - Trend Leukocytosis    d/w Dr Venegas, patient  Will Follow      
68 y/o F with hx of prior stroke with residual R facial droop, ESRD on HD, HTN, HLD, DM admitted for low back pain who had increased R facial droop and "difficulty thinking of words."  Stroke code activated. Nursing also noted patient to be possibly uncoordinated in the right arm.  Patient denies any weakness or paresthesias.  States prior stroke she had was in 2008, had difficulty speaking and R sided weakness. States has residual R facial weakness.  Stat CT head reviewed by me: No acute changes  Neuro exam significant for bilateral asterixis and occasional myoclonic jerks and R facial assymetry.  Aphasia was not noted.    Suspect metabolic encephalopathy in setting of renal failure with possible recrudescence of prior stroke symptoms.  Her asterixis and occasional myoclonic jerks are likely to be related to gabapentin in setting of ESRD.    PLAN:  - MRI Brain if able  - SBP normotension  - Decrease Gabapentin to 300mg BID  - No Thrombolytics given mild symptoms  - Cont. ASA, Plavix, Statin  - Carotid duplex  - Check Lipid Panel  - A1c 6.9

## 2025-02-27 NOTE — CONSULT NOTE ADULT - PROVIDER SPECIALTY LIST ADULT
Infectious Disease
Neurology
Orthopedics
Pain Medicine
Intervent Radiology
Orthopedics
Cardiology
Nephrology

## 2025-02-27 NOTE — PROCEDURE NOTE - ADDITIONAL PROCEDURE DETAILS
4FR 17CM 34CIRC BARD POWER MIDLINE good flash ns flush right brachial vein by ultrasound guidance.  Patient tolerated well.

## 2025-02-28 ENCOUNTER — TRANSCRIPTION ENCOUNTER (OUTPATIENT)
Age: 70
End: 2025-02-28

## 2025-02-28 LAB
ANION GAP SERPL CALC-SCNC: 16 MMOL/L — SIGNIFICANT CHANGE UP (ref 5–17)
BUN SERPL-MCNC: 41.8 MG/DL — HIGH (ref 8–20)
CALCIUM SERPL-MCNC: 9.1 MG/DL — SIGNIFICANT CHANGE UP (ref 8.4–10.5)
CHLORIDE SERPL-SCNC: 92 MMOL/L — LOW (ref 96–108)
CHOLEST SERPL-MCNC: 127 MG/DL — SIGNIFICANT CHANGE UP
CO2 SERPL-SCNC: 25 MMOL/L — SIGNIFICANT CHANGE UP (ref 22–29)
CREAT SERPL-MCNC: 6.48 MG/DL — HIGH (ref 0.5–1.3)
EGFR: 7 ML/MIN/1.73M2 — LOW
EGFR: 7 ML/MIN/1.73M2 — LOW
GLUCOSE BLDC GLUCOMTR-MCNC: 101 MG/DL — HIGH (ref 70–99)
GLUCOSE BLDC GLUCOMTR-MCNC: 85 MG/DL — SIGNIFICANT CHANGE UP (ref 70–99)
GLUCOSE BLDC GLUCOMTR-MCNC: 86 MG/DL — SIGNIFICANT CHANGE UP (ref 70–99)
GLUCOSE BLDC GLUCOMTR-MCNC: 96 MG/DL — SIGNIFICANT CHANGE UP (ref 70–99)
GLUCOSE SERPL-MCNC: 92 MG/DL — SIGNIFICANT CHANGE UP (ref 70–99)
HCT VFR BLD CALC: 33.3 % — LOW (ref 34.5–45)
HDLC SERPL-MCNC: 48 MG/DL — LOW
HGB BLD-MCNC: 10.2 G/DL — LOW (ref 11.5–15.5)
LIPID PNL WITH DIRECT LDL SERPL: 59 MG/DL — SIGNIFICANT CHANGE UP
MCHC RBC-ENTMCNC: 28.3 PG — SIGNIFICANT CHANGE UP (ref 27–34)
MCHC RBC-ENTMCNC: 30.6 G/DL — LOW (ref 32–36)
MCV RBC AUTO: 92.2 FL — SIGNIFICANT CHANGE UP (ref 80–100)
NON HDL CHOLESTEROL: 79 MG/DL — SIGNIFICANT CHANGE UP
NRBC # BLD AUTO: 0 K/UL — SIGNIFICANT CHANGE UP (ref 0–0)
NRBC # FLD: 0 K/UL — SIGNIFICANT CHANGE UP (ref 0–0)
NRBC BLD AUTO-RTO: 0 /100 WBCS — SIGNIFICANT CHANGE UP (ref 0–0)
PLATELET # BLD AUTO: 339 K/UL — SIGNIFICANT CHANGE UP (ref 150–400)
PMV BLD: 10.3 FL — SIGNIFICANT CHANGE UP (ref 7–13)
POTASSIUM SERPL-MCNC: 5 MMOL/L — SIGNIFICANT CHANGE UP (ref 3.5–5.3)
POTASSIUM SERPL-SCNC: 5 MMOL/L — SIGNIFICANT CHANGE UP (ref 3.5–5.3)
RBC # BLD: 3.61 M/UL — LOW (ref 3.8–5.2)
RBC # FLD: 16.9 % — HIGH (ref 10.3–14.5)
SODIUM SERPL-SCNC: 133 MMOL/L — LOW (ref 135–145)
TRIGL SERPL-MCNC: 109 MG/DL — SIGNIFICANT CHANGE UP
VANCOMYCIN TROUGH SERPL-MCNC: 8.9 UG/ML — LOW (ref 10–20)
WBC # BLD: 5.39 K/UL — SIGNIFICANT CHANGE UP (ref 3.8–10.5)
WBC # FLD AUTO: 5.39 K/UL — SIGNIFICANT CHANGE UP (ref 3.8–10.5)

## 2025-02-28 PROCEDURE — 90935 HEMODIALYSIS ONE EVALUATION: CPT

## 2025-02-28 PROCEDURE — 71045 X-RAY EXAM CHEST 1 VIEW: CPT | Mod: 26

## 2025-02-28 PROCEDURE — G0545: CPT

## 2025-02-28 PROCEDURE — 99232 SBSQ HOSP IP/OBS MODERATE 35: CPT

## 2025-02-28 PROCEDURE — 93880 EXTRACRANIAL BILAT STUDY: CPT | Mod: 26

## 2025-02-28 RX ORDER — HYDROMORPHONE/SOD CHLOR,ISO/PF 2 MG/10 ML
2 SYRINGE (ML) INJECTION EVERY 6 HOURS
Refills: 0 | Status: DISCONTINUED | OUTPATIENT
Start: 2025-02-28 | End: 2025-03-04

## 2025-02-28 RX ORDER — HYDROMORPHONE/SOD CHLOR,ISO/PF 2 MG/10 ML
4 SYRINGE (ML) INJECTION EVERY 6 HOURS
Refills: 0 | Status: DISCONTINUED | OUTPATIENT
Start: 2025-02-28 | End: 2025-03-04

## 2025-02-28 RX ORDER — HYDROMORPHONE/SOD CHLOR,ISO/PF 2 MG/10 ML
0.5 SYRINGE (ML) INJECTION EVERY 4 HOURS
Refills: 0 | Status: DISCONTINUED | OUTPATIENT
Start: 2025-02-28 | End: 2025-03-01

## 2025-02-28 RX ORDER — VANCOMYCIN HCL IN 5 % DEXTROSE 1.5G/250ML
1250 PLASTIC BAG, INJECTION (ML) INTRAVENOUS
Refills: 0 | Status: DISCONTINUED | OUTPATIENT
Start: 2025-02-28 | End: 2025-03-03

## 2025-02-28 RX ADMIN — MONTELUKAST SODIUM 10 MILLIGRAM(S): 10 TABLET ORAL at 15:12

## 2025-02-28 RX ADMIN — Medication 1 APPLICATION(S): at 15:16

## 2025-02-28 RX ADMIN — Medication 0.5 MILLIGRAM(S): at 10:01

## 2025-02-28 RX ADMIN — CEFEPIME 2000 MILLIGRAM(S): 2 INJECTION, POWDER, FOR SOLUTION INTRAVENOUS at 18:54

## 2025-02-28 RX ADMIN — Medication 2 TABLET(S): at 22:53

## 2025-02-28 RX ADMIN — HEPARIN SODIUM 5000 UNIT(S): 1000 INJECTION INTRAVENOUS; SUBCUTANEOUS at 22:58

## 2025-02-28 RX ADMIN — Medication 50 MILLIGRAM(S): at 22:52

## 2025-02-28 RX ADMIN — Medication 81 MILLIGRAM(S): at 15:12

## 2025-02-28 RX ADMIN — LABETALOL HYDROCHLORIDE 200 MILLIGRAM(S): 200 TABLET, FILM COATED ORAL at 19:03

## 2025-02-28 RX ADMIN — Medication 4 MILLIGRAM(S): at 15:12

## 2025-02-28 RX ADMIN — EPOETIN ALFA 10000 UNIT(S): 10000 SOLUTION INTRAVENOUS; SUBCUTANEOUS at 13:14

## 2025-02-28 RX ADMIN — Medication 4 MILLIGRAM(S): at 16:12

## 2025-02-28 RX ADMIN — GABAPENTIN 300 MILLIGRAM(S): 400 CAPSULE ORAL at 15:12

## 2025-02-28 RX ADMIN — GABAPENTIN 300 MILLIGRAM(S): 400 CAPSULE ORAL at 22:57

## 2025-02-28 RX ADMIN — HEPARIN SODIUM 5000 UNIT(S): 1000 INJECTION INTRAVENOUS; SUBCUTANEOUS at 15:12

## 2025-02-28 RX ADMIN — ATORVASTATIN CALCIUM 20 MILLIGRAM(S): 80 TABLET, FILM COATED ORAL at 22:52

## 2025-02-28 RX ADMIN — Medication 0.5 MILLIGRAM(S): at 11:01

## 2025-02-28 RX ADMIN — Medication 4 MILLIGRAM(S): at 23:12

## 2025-02-28 RX ADMIN — CLOPIDOGREL BISULFATE 75 MILLIGRAM(S): 75 TABLET, FILM COATED ORAL at 15:12

## 2025-02-28 RX ADMIN — Medication 50 MILLIGRAM(S): at 15:12

## 2025-02-28 RX ADMIN — Medication 166.67 MILLIGRAM(S): at 18:54

## 2025-02-28 NOTE — SWALLOW BEDSIDE ASSESSMENT ADULT - SWALLOW EVAL: DIAGNOSIS
Oral phase of swallow notable for prolonged insufficient mastication of easy to chew solids 2' incomplete dentition, functional for soft/bite sized. Pharyngeal phase of swallow clinically assessed to be WFL w/ no overt s/s of aspiration. Oral phase of swallow notable for prolonged insufficient mastication of easy to chew solids 2' incomplete dentition, functional for soft/bite sized trials. Pharyngeal phase of swallow clinically assessed to be WFL w/ no overt s/s of aspiration.

## 2025-02-28 NOTE — DISCHARGE NOTE PROVIDER - ATTENDING DISCHARGE PHYSICAL EXAMINATION:
VITALS:   T(C): 36.5 (03-04-25 @ 12:35), Max: 37 (03-03-25 @ 23:59)  HR: 66 (03-04-25 @ 12:35) (66 - 78)  BP: 90/45 (03-04-25 @ 12:35) (84/40 - 157/76)  RR: 16 (03-04-25 @ 12:35) (16 - 18)  SpO2: 100% (03-04-25 @ 12:35) (100% - 100%)    CONSTITUTIONAL: NAD, well-groomed  RESPIRATORY: Normal respiratory effort; lungs are clear to auscultation bilaterally  CARDIOVASCULAR: Regular rate and rhythm, no LE edema  ABDOMEN: Nontender to palpation, normoactive bowel sounds  NEURO: Word finding difficulty. 2-3/5 of RLE (chronic per pt). No facial droop noted

## 2025-02-28 NOTE — PROGRESS NOTE ADULT - SUBJECTIVE AND OBJECTIVE BOX
[facility]                                                INFECTIOUS DISEASES  =======================================================                   Pascual Lopez#   Declan Kevin MD#    Jing Ritter MD*                           Natalia Tamayo MD*   Sylvia Mo MD*   Iva Akbar *           Diplomates American Board of Internal Medicine & Infectious Diseases                  # [place] Office - Appt - Tel  [phone] Fax [phone]                * [place] Office - Appt - Tel [phone] Fax [phone]                                  Hospital Consult line:  [phone]  =======================================================      [facility]-[id number]  EULOGIO NIELSEN   follow up for: discitis  s/p code stroke undergoing w/u  patient seen and examined.       I have personally reviewed the labs and data; pertinent labs and data are listed in this note; please see below.   ===================================================  REVIEW OF SYSTEMS:  CONSTITUTIONAL:  No Fever or chills  HEENT:  No diplopia or blurred vision.  No earache, sore throat or runny nose.  CARDIOVASCULAR:  No pressure, squeezing, strangling, tightness, heaviness or aching about the chest, neck, axilla or epigastrium.  RESPIRATORY:  No cough, shortness of breath  GASTROINTESTINAL:  No nausea, vomiting or diarrhea.  GENITOURINARY:  No dysuria, frequency or urgency. No Blood in urine  MUSCULOSKELETAL:  no joint aches, no muscle pain  SKIN:  No change in skin, hair or nails.  NEUROLOGIC:  No Headaches, seizures or weakness.  PSYCHIATRIC:  No disorder of thought or mood.  ENDOCRINE:  No heat or cold intolerance  HEMATOLOGICAL:  No easy bruising or bleeding.    =======================================================  Allergies    lip swelling with lobster (Swelling)  erythromycin (Vomiting)  shellfish (Swelling; Hives)    Intolerances    Antibiotics:    Other medications:  atorvastatin 20 milliGRAM(s) Oral at bedtime  chlorhexidine 2% Cloths 1 Application(s) Topical daily  dextrose 5%. 1000 milliLiter(s) IV Continuous <Continuous>  dextrose 5%. 1000 milliLiter(s) IV Continuous <Continuous>  dextrose 50% Injectable 25 Gram(s) IV Push once  dextrose 50% Injectable 12.5 Gram(s) IV Push once  dextrose 50% Injectable 25 Gram(s) IV Push once  epoetin dereck-epbx (RETACRIT) Injectable 63098 Unit(s) IV Push <User Schedule>  folic acid 1 milliGRAM(s) Oral daily  gabapentin 300 milliGRAM(s) Oral three times a day  glucagon  Injectable 1 milliGRAM(s) IntraMuscular once  hydrALAZINE 50 milliGRAM(s) Oral three times a day  insulin lispro (ADMELOG) corrective regimen sliding scale   SubCutaneous three times a day before meals  insulin lispro (ADMELOG) corrective regimen sliding scale   SubCutaneous at bedtime  labetalol 200 milliGRAM(s) Oral two times a day  montelukast 10 milliGRAM(s) Oral daily  senna 2 Tablet(s) Oral at bedtime    ======================================================  Physical Exam:  ============  Vital Signs Last 24 Hrs  T(C): 37.1 (28 Feb 2025 17:01), Max: 37.1 (28 Feb 2025 17:01)  T(F): 98.7 (28 Feb 2025 17:01), Max: 98.7 (28 Feb 2025 17:01)  HR: 74 (28 Feb 2025 17:01) (61 - 77)  BP: 108/65 (28 Feb 2025 17:01) (96/74 - 153/69)  BP(mean): --  RR: 17 (28 Feb 2025 17:01) (17 - 18)  SpO2: 99% (28 Feb 2025 17:01) (93% - 99%)    Parameters below as of 28 Feb 2025 17:01  Patient On (Oxygen Delivery Method): room air          General:  No acute distress.  Eye: no conjunctival pallor, no scleral icterus  Respiratory: Lungs are clear to auscultation, Respirations are non-labored.  Cardiovascular: Normal rate, Regular rhythm,  s1+s2  Gastrointestinal: Soft, Non-tender, Non-distended, Normal bowel sounds.  Genitourinary: No costovertebral angle tenderness.  msk: right le scars  Integumentary: No rash. lue avf b/l le edema  Neurologic: drowsy rt facial droop No focal deficits  Psychiatric: Appropriate mood & affect.  =======================================================  Labs:                                       10.2   5.39  )-----------( 339      ( 28 Feb 2025 06:38 )             33.3       02-28    133[L]  |  92[L]  |  41.8[H]  ----------------------------<  92  5.0   |  25.0  |  6.48[H]    Ca    9.1      28 Feb 2025 06:38    TPro  5.9[L]  /  Alb  2.9[L]  /  TBili  0.2[L]  /  DBili  x   /  AST  19  /  ALT  13  /  AlkPhos  82  02-27              Urinalysis Basic - ( 28 Feb 2025 06:38 )    Color: x / Appearance: x / SG: x / pH: x  Gluc: 92 mg/dL / Ketone: x  / Bili: x / Urobili: x   Blood: x / Protein: x / Nitrite: x   Leuk Esterase: x / RBC: x / WBC x   Sq Epi: x / Non Sq Epi: x / Bacteria: x        PT/INR - ( 27 Feb 2025 11:00 )   PT: 10.8 sec;   INR: 0.93 ratio         PTT - ( 27 Feb 2025 11:00 )  PTT:31.4 sec          CAPILLARY BLOOD GLUCOSE  131 (27 Feb 2025 13:22)      POCT Blood Glucose.: 86 mg/dL (28 Feb 2025 16:46)                Urinalysis Basic - ( 26 Feb 2025 06:55 )    Color: x / Appearance: x / SG: x / pH: x  Gluc: 98 mg/dL / Ketone: x  / Bili: x / Urobili: x   Blood: x / Protein: x / Nitrite: x   Leuk Esterase: x / RBC: x / WBC x   Sq Epi: x / Non Sq Epi: x / Bacteria: x                  CAPILLARY BLOOD GLUCOSE      POCT Blood Glucose.: 187 mg/dL (26 Feb 2025 11:25)              Culture - Blood (collected 02-19-25 @ 09:08)  Source: .Blood Blood  Final Report (02-24-25 @ 13:00):    No growth at 5 days    Culture - Blood (collected 02-19-25 @ 09:08)  Source: .Blood Blood  Final Report (02-24-25 @ 13:00):    No growth at 5 days    Culture - Blood (collected 02-16-25 @ 00:20)  Source: .Blood BLOOD  Final Report (02-21-25 @ 07:00):    No growth at 5 days

## 2025-02-28 NOTE — PROGRESS NOTE ADULT - SUBJECTIVE AND OBJECTIVE BOX
Patient is a 69y old  Female who presents with a chief complaint of + trop (27 Feb 2025 15:31)      pt is c/o fatigue,speech clear.no acute weakness/numbness  AAOX3  REVIEW OF SYSTEMS: All systems are reviewed and found to be negative except above    MEDICATIONS  (STANDING):  aspirin  chewable 81 milliGRAM(s) Oral daily  atorvastatin 20 milliGRAM(s) Oral at bedtime  cefepime  Injectable. 2000 milliGRAM(s) IV Push <User Schedule>  chlorhexidine 2% Cloths 1 Application(s) Topical daily  clopidogrel Tablet 75 milliGRAM(s) Oral daily  dextrose 5%. 1000 milliLiter(s) (50 mL/Hr) IV Continuous <Continuous>  dextrose 5%. 1000 milliLiter(s) (100 mL/Hr) IV Continuous <Continuous>  dextrose 50% Injectable 25 Gram(s) IV Push once  dextrose 50% Injectable 12.5 Gram(s) IV Push once  dextrose 50% Injectable 25 Gram(s) IV Push once  epoetin dereck-epbx (RETACRIT) Injectable 13977 Unit(s) IV Push <User Schedule>  folic acid 1 milliGRAM(s) Oral daily  gabapentin 300 milliGRAM(s) Oral three times a day  glucagon  Injectable 1 milliGRAM(s) IntraMuscular once  heparin   Injectable 5000 Unit(s) SubCutaneous every 12 hours  hydrALAZINE 50 milliGRAM(s) Oral three times a day  insulin lispro (ADMELOG) corrective regimen sliding scale   SubCutaneous three times a day before meals  insulin lispro (ADMELOG) corrective regimen sliding scale   SubCutaneous at bedtime  labetalol 200 milliGRAM(s) Oral two times a day  montelukast 10 milliGRAM(s) Oral daily  senna 2 Tablet(s) Oral at bedtime  vancomycin  IVPB 1250 milliGRAM(s) IV Intermittent <User Schedule>    MEDICATIONS  (PRN):  acetaminophen     Tablet .. 650 milliGRAM(s) Oral every 6 hours PRN Temp greater or equal to 38C (100.4F), Mild Pain (1 - 3)  albuterol    90 MICROgram(s) HFA Inhaler 2 Puff(s) Inhalation every 6 hours PRN for shortness of breath and/or wheezing  aluminum hydroxide/magnesium hydroxide/simethicone Suspension 30 milliLiter(s) Oral every 4 hours PRN Dyspepsia  dextrose Oral Gel 15 Gram(s) Oral once PRN Blood Glucose LESS THAN 70 milliGRAM(s)/deciliter  HYDROmorphone   Tablet 2 milliGRAM(s) Oral every 6 hours PRN Moderate Pain (4 - 6)  HYDROmorphone   Tablet 4 milliGRAM(s) Oral every 6 hours PRN Severe Pain (7 - 10)  HYDROmorphone  Injectable 0.5 milliGRAM(s) IV Push every 4 hours PRN Severe Pain (7 - 10)  melatonin 3 milliGRAM(s) Oral at bedtime PRN Insomnia  naloxone Injectable 1 milliGRAM(s) IV Push once PRN For Opioid OD  ondansetron Injectable 4 milliGRAM(s) IV Push every 8 hours PRN Nausea and/or Vomiting      CAPILLARY BLOOD GLUCOSE  131 (27 Feb 2025 13:22)      POCT Blood Glucose.: 101 mg/dL (28 Feb 2025 08:07)  POCT Blood Glucose.: 198 mg/dL (27 Feb 2025 21:55)  POCT Blood Glucose.: 427 mg/dL (27 Feb 2025 21:53)  POCT Blood Glucose.: 90 mg/dL (27 Feb 2025 15:49)    I&O's Summary    27 Feb 2025 07:01  -  28 Feb 2025 07:00  --------------------------------------------------------  IN: 0 mL / OUT: 0 mL / NET: 0 mL        PHYSICAL EXAM:  Vital Signs Last 24 Hrs  T(C): 36.8 (28 Feb 2025 09:23), Max: 36.8 (28 Feb 2025 04:08)  T(F): 98.3 (28 Feb 2025 09:23), Max: 98.3 (28 Feb 2025 09:23)  HR: 68 (28 Feb 2025 09:23) (61 - 75)  BP: 143/68 (28 Feb 2025 09:23) (96/74 - 156/89)  BP(mean): --  RR: 18 (28 Feb 2025 09:23) (18 - 19)  SpO2: 98% (28 Feb 2025 09:23) (93% - 98%)    Parameters below as of 28 Feb 2025 09:23  Patient On (Oxygen Delivery Method): room air        CONSTITUTIONAL: NAD,  EYES: PERRLA; conjunctiva and sclera clear  ENMT: Moist oral mucosa,   RESPIRATORY: Normal respiratory effort; lungs are clear to auscultation bilaterally  CARDIOVASCULAR: Regular rate and rhythm, normal S1 and S2, no murmur   EXTS: No lower extremity edema; Peripheral pulses are 2+ bilaterally  ABDOMEN: Nontender to palpation, normoactive bowel sounds, no rebound/guarding;   MUSCLOSKELETAL: RT KNEE:  no joint swelling, MILD  tenderness to palpation  PSYCH: COOP  NEUROLOGY: A+O to person, place, and time; CN 2-12 are intact and symmetric; no gross sensory/Motor  deficits; very mild rt facial droop      LABS:                        10.2   5.39  )-----------( 339      ( 28 Feb 2025 06:38 )             33.3     02-28    133[L]  |  92[L]  |  41.8[H]  ----------------------------<  92  5.0   |  25.0  |  6.48[H]    Ca    9.1      28 Feb 2025 06:38    TPro  5.9[L]  /  Alb  2.9[L]  /  TBili  0.2[L]  /  DBili  x   /  AST  19  /  ALT  13  /  AlkPhos  82  02-27    PT/INR - ( 27 Feb 2025 11:00 )   PT: 10.8 sec;   INR: 0.93 ratio         PTT - ( 27 Feb 2025 11:00 )  PTT:31.4 sec      Urinalysis Basic - ( 28 Feb 2025 06:38 )    Color: x / Appearance: x / SG: x / pH: x  Gluc: 92 mg/dL / Ketone: x  / Bili: x / Urobili: x   Blood: x / Protein: x / Nitrite: x   Leuk Esterase: x / RBC: x / WBC x   Sq Epi: x / Non Sq Epi: x / Bacteria: x          RADIOLOGY & ADDITIONAL TESTS:  Results Reviewed:

## 2025-02-28 NOTE — SWALLOW BEDSIDE ASSESSMENT ADULT - SLP GENERAL OBSERVATIONS
Pt recd a&ox1, cooperative, intermittently confused, +word finding deficits, trace-mild dysarthria noted, NAD, 0/10 pain scale pre/post

## 2025-02-28 NOTE — DISCHARGE NOTE PROVIDER - NSDCCPCAREPLAN_GEN_ALL_CORE_FT
PRINCIPAL DISCHARGE DIAGNOSIS  Diagnosis: Discitis of lumbar region  Assessment and Plan of Treatment:       SECONDARY DISCHARGE DIAGNOSES  Diagnosis: Aphagia  Assessment and Plan of Treatment:     Diagnosis: CAD (coronary artery disease)  Assessment and Plan of Treatment:     Diagnosis: ESRD on dialysis  Assessment and Plan of Treatment:     Diagnosis: Discitis of lumbar region  Assessment and Plan of Treatment:

## 2025-02-28 NOTE — PROGRESS NOTE ADULT - SUBJECTIVE AND OBJECTIVE BOX
Pt Name: EULOGIO NIELSEN  MRN: [id number]    69F w/ L4-5 discitis.  Patient reports lower back pain / b/l LE pain has been improving and controlled with the prescribed pain medications. The patient is participating in physical therapy. Denies nausea, vomiting, chest pain, shortness of breath, abdominal pain or fever. No new orthopedic complaints.    PAST MEDICAL & SURGICAL HISTORY:  CAD (coronary artery disease)  CKD (chronic kidney disease) requiring chronic dialysis  Type 2 diabetes mellitus  Diabetic retinopathy associated with diabetes mellitus due to underlying condition  Hypertension  HLD (hyperlipidemia)  CVA (cerebral vascular accident)    Asthma  S/P CABG (coronary artery bypass graft)  S/P coronary artery stent placement  (1)   S/P cataract extraction  OS with retained lens fragment 7/15/15  Acute hemodialysis patient  nothing in the left arm awaiting AV fistula placement - temporary cath in right upper chest quadrant  S/P  section  S/P cholecystectomy    Allergies: lip swelling with lobster (Swelling)  erythromycin (Vomiting)  shellfish (Swelling; Hives)    Ambulation: Walking independently [ ] With Cane [ ] With Walker [ ]  Bedbound [ ]       Vital Signs Last 24 Hrs  T(C): 36.8 (2025 04:08), Max: 36.9 (2025 09:37)  T(F): 98.2 (2025 04:08), Max: 98.4 (2025 09:37)  HR: 71 (2025 04:08) (61 - 76)  BP: 153/69 (2025 04:08) (96/74 - 156/89)  BP(mean): --  RR: 18 (2025 04:08) (17 - 19)  SpO2: 95% (2025 04:08) (93% - 97%)    Parameters below as of 2025 04:08  Patient On (Oxygen Delivery Method): room air      Daily     Daily                           10.2   5.39  )-----------( 339      ( 2025 06:38 )             33.3     02-    133[L]  |  92[L]  |  41.8[H]  ----------------------------<  92  5.0   |  25.0  |  6.48[H]    Ca    9.1      2025 06:38    TPro  5.9[L]  /  Alb  2.9[L]  /  TBili  0.2[L]  /  DBili  x   /  AST  19  /  ALT  13  /  AlkPhos  82      Physical Exam:  Appearance: Alert, responsive, in no acute distress.  Skin: no rash on visible skin. Skin is clean, dry and intact. No bleeding. No abrasions. No ulcerations.  Musculoskeletal:       Lumbar: dressing clean, dry, intact. +HV     Left Lower Extremity: Sensation is grossly intact to light touch. 2+ distal pulses. Cap refill < 2 sec.      Right Lower Extremity: Sensation is grossly intact to light touch. 2+ distal pulses. Cap refill < 2 sec.   Pathologic reflexes: [ ] Holt,  [ ]  Clonus            Reflexes:   Biceps, Bracioradialis, Patella, Achilles intact          Motor exam: [  ]          [ ] Lower extremity            HF(l2)    KE(l3)   TA(l4)  EHL(l5)    GS(s1)                                                 R                 5                                               L               A/P:  Pt is a  69y Female being followed fro dicitis  • DVT prophylaxis as prescribed, including use of compression devices and ankle pumps  • Continue physical therapy  • Out of Bed as tolerated with assistance of a walker  • Abx as per ID  • Pain control as clinically indicated  • F/u ID definitive plan

## 2025-02-28 NOTE — DISCHARGE NOTE PROVIDER - HOSPITAL COURSE
70 y/o female with PMH of HTN, HLD, ESRD on HD (M/W/F), DM-2 came to the ED complaining of low back pain.Found to have Discitis/OM of L4-5 on mri spine,ct lumbar spine.Per Ortho - Offered surgery but patient wants conservative management. asa/plavix held  for 5 days for biospy. .S/P Biopsy by IR on 02/25. start cefepime and vancomycin with HD, monitor trough Per ID   f/u biopsy ngtd. f/u id rec about abx duration. ABX WITH hd. Code stroke was called on 02/27 for rt facial droop, difficulty words finding.CT head no acute stroke. MRI brain, MRA brain/neck w/o contrast per neurology. Speech eval.    70 y/o female with PMH of HTN, HLD, ESRD on HD (M/W/F), DM-2 came to the ED complaining of low back pain.Found to have Discitis/OM of L4-5 on mri spine,ct lumbar spine.Per Ortho - Offered surgery but patient wants conservative management. asa/plavix held  for 5 days for biospy. S/P Biopsy by IR on 02/25. start cefepime and vancomycin with HD, monitor trough Per ID   f/u biopsy ngtd. f/u id rec about abx duration. ABX WITH hd. Code stroke was called on 02/27 for rt facial droop, difficulty words finding.CT head no acute stroke. MRI brain, MRA brain/neck w/o contrast with acute findings. Suspect neurological findings 2/2 ESRD and gabapentin use which was discontinued. Cleared by neurology for discharge. Also seen by ID, plan for abx with HD until 4/9. Cleared for discharge to [facility].

## 2025-02-28 NOTE — PROGRESS NOTE ADULT - ASSESSMENT
68 y/o female with PMH of HTN, HLD, ESRD on HD (M/W/F), DM-2 came to the ED complaining of low back pain.Found to have Discitis/OM of L4-5 on mri spine,ct lumbar spine.Per Ortho - Offered surgery but patient wants conservative management. asa/plavix held  for 5 days for biospy. .S/P Biopsy by IR on 02/25. start cefepime and vancomycin with HD, monitor trough Per ID   f/u biopsy ngtd. f/u id rec about abx duration. ABX WITH hd. Code stroke was called on 02/27 for rt facial droop, difficulty words finding.CT head no acute stroke. MRI brain, MRA brain/neck w/o contrast per neurology.       Code stroke called on 02/27  pt unable to express works ,rt facial drop, ataxia   Pt is currently aaox3. mild rt facial droop. no acute motor/sensory changed.   stat CTH no acute stroke  Neurocheck q4hr   CUS, MRI brain, MRA brain/neck w/o contrast per neurology.  CONTINUE ASA,PLAVIX ,STATIN  Bedside dysphagia screen, spoke with RN  If Failed SLP    f/u neurology rec      Elevated troponin likely from CKD  -EKG ,tte Reviewed with   -Continue DAPT   -No chest pain  -Tele          #Acute on chronic low back pain   #Discitis/OM of L4-5  S/P Biopsy by IR on 02/25  start cefepime and vancomycin with HD, monitor trough Per ID   f/u biopsy ngtd. f/u id rec about abx duration. ABX WITH hd  CT lumbar degenerative disc changes at L4-L5 concerning for discitis/OM   MRI with OM/Discitis of L4/5  Blood cx neg    Pain management following   Per Ortho - Offered surgery but patient wants conservative management   PT eval HOME W/ASSIST  DW  for safe discharge.       #Right knee pain   XR knee: lateral prosthetic patellar dislocation  Seen by ortho  Pain control . Seen by pain mx  PT eval     #ESRD   On HD (M/W/F)   Nephrology following     #HTN/HLD/CAD   Aspirin /plavix 75mg    Simvastatin 40mg   Labetalol 200mg bid   Hydralazine 50mg       #Hyperglycemia with DM-2   Patient not on medication   HbA1C: 5.5 (07/24/23)   Insulin sliding scale   A1C of 6.9      DVT prophylaxis: Heparin sc        Dispo:  Pending MRI brain, MRA BRAIN/NECK  PT eval home/assist. pt wants [facility]    CONSTANCE DESHPANDE for safe discharge       68 y/o female with PMH of HTN, HLD, ESRD on HD (M/W/F), DM-2 came to the ED complaining of low back pain.Found to have Discitis/OM of L4-5 on mri spine,ct lumbar spine.Per Ortho - Offered surgery but patient wants conservative management. asa/plavix held  for 5 days for biospy. .S/P Biopsy by IR on 02/25. start cefepime and vancomycin with HD, monitor trough Per ID   f/u biopsy ngtd. f/u id rec about abx duration. ABX WITH hd. Code stroke was called on 02/27 for rt facial droop, difficulty words finding.CT head no acute stroke. MRI brain, MRA brain/neck w/o contrast per neurology.       Code stroke called on 02/27  pt unable to express works ,rt facial drop, ataxia   Pt is currently aaox3. mild rt facial droop. no acute motor/sensory changed.   stat CTH no acute stroke  Neurocheck q4hr   CUS, MRI brain, MRA brain/neck w/o contrast per neurology.  CONTINUE ASA,PLAVIX ,STATIN  Bedside dysphagia screen, spoke with RN  If Failed SLP    f/u neurology rec      Elevated troponin likely from CKD  -EKG ,tte Reviewed with   -Continue DAPT   -No chest pain  -Tele          #Acute on chronic low back pain   #Discitis/OM of L4-5  S/P Biopsy by IR on 02/25  start cefepime and vancomycin with HD, monitor trough Per ID   f/u biopsy ngtd. f/u id rec about abx duration. ABX WITH hd  CT lumbar degenerative disc changes at L4-L5 concerning for discitis/OM   MRI with OM/Discitis of L4/5  Blood cx neg    Pain management following   Per Ortho - Offered surgery but patient wants conservative management   PT eval HOME W/ASSIST  DW  for safe discharge.       #Right knee pain   XR knee: lateral prosthetic patellar dislocation  Seen by ortho  Pain control . Seen by pain mx  PT eval     #ESRD   On HD (M/W/F)   Nephrology following     #HTN/HLD/CAD   Aspirin /plavix 75mg    Simvastatin 40mg   Labetalol 200mg bid   Hydralazine 50mg       #Hyperglycemia with DM-2   Patient not on medication   HbA1C: 5.5 (07/24/23)   Insulin sliding scale   A1C of 6.9      DVT prophylaxis: Heparin sc        Dispo:  Pending MRI brain, MRA BRAIN/NECK . dc plan  [facility] when stable    DW SW for safe discharge- hepatitis panel, cxr order. PT/OT/SLP order  Plan of card dw pt  libertad rn

## 2025-02-28 NOTE — PROGRESS NOTE ADULT - ASSESSMENT
68 y/o female with PMH of HTN, HLD, ESRD on HD (M/W/F), DM-2 came to the ED complaining of low back pain. Pain has been going on for few days; described as aching, radiating to both thighs anteriorly. Patient reported chronic back pain but in the past few days it has worsened; bending forward/sitting on the side exacerbate the pain. She has no trauma to the back, fall, fever, chills, nausea, vomiting, abdominal pain, change in bowel habit, chest pain, shortness of breath.    ID called for MRi findings reporting L4-L5 discitis/OM    - no fever  - reports low back pain for at least 2 years, now worsening since last week prior to arrival  - recent flu but otherwise no infections, no dental work  - now prior infections of AVG  - has had Rt knee revision in the past but no infections  - MRI LS spine= Discitis/osteomyelitis at L4-L5 with 5 mm anterolisthesis.   - mrsa screen neg  -bcx ngtd  - TTE no vegetations  - trend ESr CRP  - s/p IR disc biopsy, f/u cultures  -  c/w cefepime and vancomycin with HD, monitor trough  - f/u IR cultures  - Trend Fever  - Trend Leukocytosis    d/w  pharmacy  Will Follow

## 2025-02-28 NOTE — DISCHARGE NOTE PROVIDER - CARE PROVIDER_API CALL
Natalia Tamayo  Infectious Disease  [address], [address]  [place] [address]  Phone: [phone]  Fax: [phone]  Follow Up Time: 1 month

## 2025-02-28 NOTE — SWALLOW BEDSIDE ASSESSMENT ADULT - SLP PERTINENT HISTORY OF CURRENT PROBLEM
As per MD note: " 70 y/o F with hx of prior stroke with residual R facial droop, ESRD on HD, HTN, HLD, DM admitted for low back pain who had increased R facial droop and "difficulty thinking of words."  Stroke code activated. Nursing also noted patient to be possibly uncoordinated in the right arm.  Patient denies any weakness or paresthesias.  States prior stroke she had was in 2008, had difficulty speaking and R sided weakness. States has residual R facial weakness."

## 2025-02-28 NOTE — DISCHARGE NOTE PROVIDER - NSDCMRMEDTOKEN_GEN_ALL_CORE_FT
acetaminophen 325 mg oral tablet: 2 tab(s) orally every 6 hours As needed Temp greater or equal to 38C (100.4F), Mild Pain (1 - 3)  Albuterol (Eqv-ProAir HFA) 90 mcg/inh inhalation aerosol: 2 puff(s) inhaled every 6 hours as needed for  shortness of breath and/or wheezing  albuterol 2.5 mg/3 mL (0.083%) inhalation solution: 3 milliliter(s) by nebulizer every 6 hours as needed for  shortness of breath and/or wheezing  Aspir 81 oral delayed release tablet: 1 tab(s) orally once a day  folic acid 1 mg oral tablet: 1 tab(s) orally once a day  hydrALAZINE 50 mg oral tablet: 1 tab(s) orally 2 times a day  labetalol 200 mg oral tablet: 1 tab(s) orally 2 times a day  magnesium hydroxide 8% oral suspension: 30 milliliter(s) orally once a day As needed Constipation  Plavix 75 mg oral tablet: 1 tab(s) orally once a day  Singulair 10 mg oral tablet: 1 tab(s) orally once a day  Zocor 40 mg oral tablet: 1 tab(s) orally once a day (at bedtime)   acetaminophen 325 mg oral tablet: 2 tab(s) orally every 6 hours As needed Temp greater or equal to 38C (100.4F), Mild Pain (1 - 3)  Albuterol (Eqv-ProAir HFA) 90 mcg/inh inhalation aerosol: 2 puff(s) inhaled every 6 hours as needed for  shortness of breath and/or wheezing  Aspir 81 oral delayed release tablet: 1 tab(s) orally once a day  atorvastatin 40 mg oral tablet: 1 tab(s) orally once a day (at bedtime)  cefepime 2 g intravenous injection: 2 gram(s) intravenous Monday, Wednesday, and Friday give with HD until 4/9  Dilaudid 2 mg oral tablet: 1 tab(s) orally every 6 hours as needed for  severe pain  folic acid 1 mg oral tablet: 1 tab(s) orally once a day  insulin lispro 100 units/mL injectable solution: injectable 3 times a day sliding scale with meals  labetalol 200 mg oral tablet: 1 tab(s) orally 2 times a day  magnesium hydroxide 8% oral suspension: 30 milliliter(s) orally once a day As needed Constipation  Plavix 75 mg oral tablet: 1 tab(s) orally once a day  Singulair 10 mg oral tablet: 1 tab(s) orally once a day  vancomycin 750 mg intravenous injection: 750 milligram(s) intravenous Monday, Wednesday, and Friday give with HD until 4/9

## 2025-02-28 NOTE — PROGRESS NOTE ADULT - SUBJECTIVE AND OBJECTIVE BOX
[facility] DIVISION OF KIDNEY DISEASES AND HYPERTENSION -- DIALYSIS NOTE    Patient seen and examined during dialysis  Patient tolerating the procedure well.     VITALS  --------------------------------------------------------------------------------  T(C): 36.7 (02-28-25 @ 11:02), Max: 36.8 (02-28-25 @ 04:08)  HR: 68 (02-28-25 @ 11:02) (61 - 75)  BP: 125/54 (02-28-25 @ 11:02) (96/74 - 156/89)  RR: 17 (02-28-25 @ 11:02) (17 - 19)  SpO2: 97% (02-28-25 @ 11:02) (93% - 98%)  Wt(kg): --     Will continue the current medical management. Next hemodialysis as scheduled.     minimal UF today  noted plans for MRI without gadolinium- follow results

## 2025-03-01 LAB
ALBUMIN SERPL ELPH-MCNC: 3.3 G/DL — SIGNIFICANT CHANGE UP (ref 3.3–5.2)
ALP SERPL-CCNC: 76 U/L — SIGNIFICANT CHANGE UP (ref 40–120)
ALT FLD-CCNC: 9 U/L — SIGNIFICANT CHANGE UP
ANION GAP SERPL CALC-SCNC: 13 MMOL/L — SIGNIFICANT CHANGE UP (ref 5–17)
AST SERPL-CCNC: 15 U/L — SIGNIFICANT CHANGE UP
BILIRUB SERPL-MCNC: 0.3 MG/DL — LOW (ref 0.4–2)
BUN SERPL-MCNC: 25.9 MG/DL — HIGH (ref 8–20)
CALCIUM SERPL-MCNC: 9.3 MG/DL — SIGNIFICANT CHANGE UP (ref 8.4–10.5)
CHLORIDE SERPL-SCNC: 93 MMOL/L — LOW (ref 96–108)
CO2 SERPL-SCNC: 27 MMOL/L — SIGNIFICANT CHANGE UP (ref 22–29)
CREAT SERPL-MCNC: 4.42 MG/DL — HIGH (ref 0.5–1.3)
EGFR: 10 ML/MIN/1.73M2 — LOW
EGFR: 10 ML/MIN/1.73M2 — LOW
GLUCOSE BLDC GLUCOMTR-MCNC: 112 MG/DL — HIGH (ref 70–99)
GLUCOSE BLDC GLUCOMTR-MCNC: 126 MG/DL — HIGH (ref 70–99)
GLUCOSE BLDC GLUCOMTR-MCNC: 133 MG/DL — HIGH (ref 70–99)
GLUCOSE BLDC GLUCOMTR-MCNC: 188 MG/DL — HIGH (ref 70–99)
GLUCOSE BLDC GLUCOMTR-MCNC: 92 MG/DL — SIGNIFICANT CHANGE UP (ref 70–99)
GLUCOSE SERPL-MCNC: 89 MG/DL — SIGNIFICANT CHANGE UP (ref 70–99)
HCT VFR BLD CALC: 31.9 % — LOW (ref 34.5–45)
HGB BLD-MCNC: 9.3 G/DL — LOW (ref 11.5–15.5)
MAGNESIUM SERPL-MCNC: 2.2 MG/DL — SIGNIFICANT CHANGE UP (ref 1.6–2.6)
MCHC RBC-ENTMCNC: 27.4 PG — SIGNIFICANT CHANGE UP (ref 27–34)
MCHC RBC-ENTMCNC: 29.2 G/DL — LOW (ref 32–36)
MCV RBC AUTO: 94.1 FL — SIGNIFICANT CHANGE UP (ref 80–100)
NRBC # BLD AUTO: 0 K/UL — SIGNIFICANT CHANGE UP (ref 0–0)
NRBC # FLD: 0 K/UL — SIGNIFICANT CHANGE UP (ref 0–0)
NRBC BLD AUTO-RTO: 0 /100 WBCS — SIGNIFICANT CHANGE UP (ref 0–0)
PHOSPHATE SERPL-MCNC: 4.5 MG/DL — SIGNIFICANT CHANGE UP (ref 2.4–4.7)
PLATELET # BLD AUTO: 335 K/UL — SIGNIFICANT CHANGE UP (ref 150–400)
PMV BLD: 10.2 FL — SIGNIFICANT CHANGE UP (ref 7–13)
POTASSIUM SERPL-MCNC: 4.4 MMOL/L — SIGNIFICANT CHANGE UP (ref 3.5–5.3)
POTASSIUM SERPL-SCNC: 4.4 MMOL/L — SIGNIFICANT CHANGE UP (ref 3.5–5.3)
PROT SERPL-MCNC: 6.2 G/DL — LOW (ref 6.6–8.7)
RBC # BLD: 3.39 M/UL — LOW (ref 3.8–5.2)
RBC # FLD: 17.2 % — HIGH (ref 10.3–14.5)
SODIUM SERPL-SCNC: 133 MMOL/L — LOW (ref 135–145)
WBC # BLD: 5.58 K/UL — SIGNIFICANT CHANGE UP (ref 3.8–10.5)
WBC # FLD AUTO: 5.58 K/UL — SIGNIFICANT CHANGE UP (ref 3.8–10.5)

## 2025-03-01 PROCEDURE — 70547 MR ANGIOGRAPHY NECK W/O DYE: CPT | Mod: 26

## 2025-03-01 PROCEDURE — 70551 MRI BRAIN STEM W/O DYE: CPT | Mod: 26

## 2025-03-01 PROCEDURE — 99232 SBSQ HOSP IP/OBS MODERATE 35: CPT

## 2025-03-01 PROCEDURE — 70544 MR ANGIOGRAPHY HEAD W/O DYE: CPT | Mod: 26,XU

## 2025-03-01 RX ORDER — ACETAMINOPHEN 500 MG/5ML
975 LIQUID (ML) ORAL EVERY 8 HOURS
Refills: 0 | Status: DISCONTINUED | OUTPATIENT
Start: 2025-03-01 | End: 2025-03-04

## 2025-03-01 RX ORDER — HYDROMORPHONE/SOD CHLOR,ISO/PF 2 MG/10 ML
0.5 SYRINGE (ML) INJECTION EVERY 6 HOURS
Refills: 0 | Status: DISCONTINUED | OUTPATIENT
Start: 2025-03-01 | End: 2025-03-04

## 2025-03-01 RX ORDER — ALBUMIN (HUMAN) 12.5 G/50ML
100 INJECTION, SOLUTION INTRAVENOUS ONCE
Refills: 0 | Status: COMPLETED | OUTPATIENT
Start: 2025-03-01 | End: 2025-03-01

## 2025-03-01 RX ORDER — ATORVASTATIN CALCIUM 80 MG/1
40 TABLET, FILM COATED ORAL AT BEDTIME
Refills: 0 | Status: DISCONTINUED | OUTPATIENT
Start: 2025-03-01 | End: 2025-03-04

## 2025-03-01 RX ADMIN — Medication 1 APPLICATION(S): at 11:11

## 2025-03-01 RX ADMIN — Medication 975 MILLIGRAM(S): at 21:19

## 2025-03-01 RX ADMIN — CLOPIDOGREL BISULFATE 75 MILLIGRAM(S): 75 TABLET, FILM COATED ORAL at 11:03

## 2025-03-01 RX ADMIN — Medication 975 MILLIGRAM(S): at 14:00

## 2025-03-01 RX ADMIN — LABETALOL HYDROCHLORIDE 200 MILLIGRAM(S): 200 TABLET, FILM COATED ORAL at 11:04

## 2025-03-01 RX ADMIN — FOLIC ACID 1 MILLIGRAM(S): 1 TABLET ORAL at 11:03

## 2025-03-01 RX ADMIN — ATORVASTATIN CALCIUM 40 MILLIGRAM(S): 80 TABLET, FILM COATED ORAL at 21:19

## 2025-03-01 RX ADMIN — GABAPENTIN 300 MILLIGRAM(S): 400 CAPSULE ORAL at 21:19

## 2025-03-01 RX ADMIN — Medication 975 MILLIGRAM(S): at 13:43

## 2025-03-01 RX ADMIN — HEPARIN SODIUM 5000 UNIT(S): 1000 INJECTION INTRAVENOUS; SUBCUTANEOUS at 21:18

## 2025-03-01 RX ADMIN — MONTELUKAST SODIUM 10 MILLIGRAM(S): 10 TABLET ORAL at 11:03

## 2025-03-01 RX ADMIN — LABETALOL HYDROCHLORIDE 200 MILLIGRAM(S): 200 TABLET, FILM COATED ORAL at 17:10

## 2025-03-01 RX ADMIN — ALBUMIN (HUMAN) 50 MILLILITER(S): 12.5 INJECTION, SOLUTION INTRAVENOUS at 01:04

## 2025-03-01 RX ADMIN — HEPARIN SODIUM 5000 UNIT(S): 1000 INJECTION INTRAVENOUS; SUBCUTANEOUS at 11:08

## 2025-03-01 RX ADMIN — Medication 50 MILLIGRAM(S): at 06:04

## 2025-03-01 RX ADMIN — Medication 0.5 MILLIGRAM(S): at 08:36

## 2025-03-01 RX ADMIN — Medication 2 TABLET(S): at 21:20

## 2025-03-01 RX ADMIN — Medication 81 MILLIGRAM(S): at 11:04

## 2025-03-01 RX ADMIN — GABAPENTIN 300 MILLIGRAM(S): 400 CAPSULE ORAL at 13:44

## 2025-03-01 RX ADMIN — Medication 4 MILLIGRAM(S): at 11:02

## 2025-03-01 RX ADMIN — Medication 975 MILLIGRAM(S): at 22:19

## 2025-03-01 RX ADMIN — Medication 4 MILLIGRAM(S): at 12:00

## 2025-03-01 RX ADMIN — Medication 4 MILLIGRAM(S): at 00:12

## 2025-03-01 RX ADMIN — Medication 50 MILLIGRAM(S): at 13:44

## 2025-03-01 RX ADMIN — Medication 0.5 MILLIGRAM(S): at 09:00

## 2025-03-01 NOTE — CHART NOTE - NSCHARTNOTEFT_GEN_A_CORE
Contacted by RN due to patient hypotensive overnight, 95/40, previous BP a few hours prior was 150s systolic. Patient is a 70yo F with PMHx HTN, HLD, eSRD on HD, DM2, admitted for lower back pain found to have discitis/OM of L4-5. Followed by ortho, offered surgery, patient refused and wanted conservative management. While admitted patient was a code stroke on 2/27 for right facial droop and difficulty word finding, CT head negative. MRI brain, MRA brain/neck pending.     Patient overnight with low blood pressure. Seen at bedside with RN. Patient is A&Ox4, states she "feels fine" and that "hydralazine kicked my butt." Her blood pressure was 150's-160's systolic 2 hours prior around 2230 and she received 50mg Hydralazine, gabapentin, and 4mg PO Dilaudid for pain. She also went for dialysis earlier today where they took over a liter of fluids off. Patient denies chest pain, abdominal pain, dizziness, lightheadedness, n/v.     Vital Signs Last 24 Hrs  T(C): 36.8 (01 Mar 2025 00:10), Max: 37.1 (28 Feb 2025 17:01)  T(F): 98.2 (01 Mar 2025 00:10), Max: 98.7 (28 Feb 2025 17:01)  HR: 75 (01 Mar 2025 00:25) (68 - 81)  BP: 95/40 (01 Mar 2025 00:25) (90/50 - 160/68)  BP(mean): 59 (01 Mar 2025 00:25) (59 - 59)  ABP: --  ABP(mean): --  RR: 18 (01 Mar 2025 00:10) (17 - 18)  SpO2: 97% (01 Mar 2025 00:10) (94% - 99%)    O2 Parameters below as of 01 Mar 2025 00:10  Patient On (Oxygen Delivery Method): room air      CONSTITUTIONAL: NAD, laying in bed comfortably talking to family on the phone  EYES: conjunctiva and sclera clear  ENMT: Moist oral mucosa,   RESPIRATORY: Normal respiratory effort; lungs are clear to auscultation bilaterally  CARDIOVASCULAR: Regular rate and rhythm  ABDOMEN: Nontender to palpation, normoactive bowel sounds, no rebound/guarding  NEUROLOGY: A&Ox3, answers questions appropriately    A/P: Pt hypotensive overnight s/p dialysis today, hydralazine, gabapentin, and 4mg PO dilaudid for pain. Pt asymptomatic, A&Ox3, refusing STAT labs and ensuring she feels "fine".   -AM labs, CBC, BMP, Mag, T&S  -Albumin x1  -continue meds with holding parameters  -Finger stick: 133    RN to call with questions or concerns  Will endorse to day team

## 2025-03-01 NOTE — PROGRESS NOTE ADULT - ASSESSMENT
68 y/o female with PMH of HTN, HLD, ESRD on HD (M/W/F), DM-2 came to the ED complaining of low back pain.Found to have Discitis/OM of L4-5 on mri spine,ct lumbar spine.Per Ortho - Offered surgery but patient wants conservative management. asa/plavix held  for 5 days for biospy. .S/P Biopsy by IR on 02/25. start cefepime and vancomycin with HD, monitor trough Per ID   f/u biopsy ngtd. f/u id rec about abx duration. ABX WITH hd. Code stroke was called on 02/27 for rt facial droop, difficulty words finding.CT head no acute stroke. MRI brain, MRA brain/neck w/o contrast per neurology.     #R/o CVA  Code stroke called on 02/27  pt unable to express words  stat CTH no acute stroke  Neurocheck q4hr   MRI brain, MRA brain/neck w/o contrast without acute CVA  ASA, plavix, statin  neuro recs danny  carotid us noted calcified plaque within the proximal left ICA with a peak systolic velocity of 196 cm/s consistent with a 50-69% stenosis, outpt vascular followup  PT/OT eval pending      #Acute on chronic low back pain 2/2 Discitis/OM of L4-5  CT lumbar degenerative disc changes at L4-L5 concerning for discitis/OM   MRI with OM/Discitis of L4/5  S/P biopsy by IR on 02/25  cw cefepime and vancomycin with HD per ID  Biopsy ngtd  Blood cx neg    Pain regimen  Eval by Ortho - Patient wants conservative management     #Elevated troponin likely from CKD  EKG, TTE reviewed with   Continue DAPT   No chest pain  Tele    #Right knee pain   XR knee: lateral prosthetic patellar dislocation  Seen by ortho  Pain control  PT eval     #ESRD   On HD (M/W/F)   Nephrology following     #HTN/HLD/CAD   Aspirin /plavix 75mg    Statin 20mg QD  Labetalol 200mg bid   Hydralazine 50mg     #Hyperglycemia with DM-2   Patient not on medication   HbA1C: 5.5 (07/24/23)   Insulin sliding scale   A1C of 6.9    DVT prophylaxis: Heparin sc  Dispo: Neuro workup and ID recs for dispo, likely dc to [facility]    PT eval pending      68 y/o female with PMH of HTN, HLD, ESRD on HD (M/W/F), DM-2 came to the ED complaining of low back pain.Found to have Discitis/OM of L4-5 on mri spine,ct lumbar spine.Per Ortho - Offered surgery but patient wants conservative management. asa/plavix held  for 5 days for biospy. .S/P Biopsy by IR on 02/25. start cefepime and vancomycin with HD, monitor trough Per ID   f/u biopsy ngtd. f/u id rec about abx duration. ABX WITH hd. Code stroke was called on 02/27 for rt facial droop, difficulty words finding.CT head no acute stroke. MRI brain, MRA brain/neck w/o contrast per neurology.     #R/o CVA  Code stroke called on 02/27  pt unable to express words  stat CTH no acute stroke  Neurocheck q4hr   MRI brain, MRA brain/neck w/o contrast without acute CVA  ASA, plavix, statin increase to 40mg  neuro recs danny  carotid us noted calcified plaque within the proximal left ICA with a peak systolic velocity of 196 cm/s consistent with a 50-69% stenosis, outpt vascular followup  PT/OT eval pending      #Acute on chronic low back pain 2/2 Discitis/OM of L4-5  CT lumbar degenerative disc changes at L4-L5 concerning for discitis/OM   MRI with OM/Discitis of L4/5  S/P biopsy by IR on 02/25  cw cefepime and vancomycin with HD per ID  Biopsy ngtd  Blood cx neg    Pain regimen  Eval by Ortho - Patient wants conservative management     #Elevated troponin likely from CKD  EKG, TTE reviewed with   Continue DAPT   No chest pain  Tele    #Right knee pain   XR knee: lateral prosthetic patellar dislocation  Seen by ortho  Pain control  PT eval     #ESRD   On HD (M/W/F)   Nephrology following     #HTN/HLD/CAD   Aspirin /plavix 75mg    Lipitor 40mg QD  Labetalol 200mg bid   Hydralazine 50mg     #Hyperglycemia with DM-2   Patient not on medication   HbA1C: 5.5 (07/24/23)   Insulin sliding scale   A1C of 6.9    DVT prophylaxis: Heparin sc  Dispo: Neuro workup and ID recs for dispo, likely dc to [facility]    PT eval pending

## 2025-03-01 NOTE — PROGRESS NOTE ADULT - SUBJECTIVE AND OBJECTIVE BOX
The chart was reviewed, and case discussed with Dr Garcias  In brief, the patients is a 69 year old female with a history of hypertension, hyperlipidemia, DM II and ESRD admitted with back pain, and diagnosed with discitis/OM by MRI.    The patient was seen and evaluated in her room, awake and aler, in no distress, and denies chest apin, shortness of breath, nausea, vomiting and diarrhea,     ICU Vital Signs Last 24 Hrs  T(C): 36.7 (01 Mar 2025 10:54), Max: 37.1 (28 Feb 2025 17:01)  T(F): 98.1 (01 Mar 2025 10:54), Max: 98.7 (28 Feb 2025 17:01)  HR: 76 (01 Mar 2025 10:54) (67 - 81)  BP: 173/84 (01 Mar 2025 10:54) (90/50 - 173/84)  BP(mean): 114 (01 Mar 2025 10:54) (59 - 114)  ABP: --  ABP(mean): --  RR: 20 (01 Mar 2025 10:54) (17 - 20)  SpO2: 100% (01 Mar 2025 10:54) (97% - 100%)    O2 Parameters below as of 01 Mar 2025 10:54  Patient On (Oxygen Delivery Method): room air    I&O's Summary    28 Feb 2025 07:01  -  01 Mar 2025 07:00  --------------------------------------------------------  IN: 180 mL / OUT: 500 mL / NET: -320 mL    On exam:  Awake and alert, in no distress  (-)JVD  Lungs: bilateral decreased air entry, CTA  Heart: S1, S2 no aud rubs, gallops  S1,S2 no aud rubs, gallops  Abd soft  EXT minimal edema    03-01    133[L]  |  93[L]  |  25.9[H]  ----------------------------<  89  4.4   |  27.0  |  4.42[H]    Ca    9.3      01 Mar 2025 05:12  Phos  4.5     03-01  Mg     2.2     03-01    TPro  6.2[L]  /  Alb  3.3  /  TBili  0.3[L]  /  DBili  x   /  AST  15  /  ALT  9   /  AlkPhos  76  03-01                          9.3    5.58  )-----------( 335      ( 01 Mar 2025 05:12 )             31.9         MEDICATIONS  (STANDING):  acetaminophen     Tablet .. 975 milliGRAM(s) Oral every 8 hours  aspirin  chewable 81 milliGRAM(s) Oral daily  atorvastatin 40 milliGRAM(s) Oral at bedtime  cefepime  Injectable. 2000 milliGRAM(s) IV Push <User Schedule>  chlorhexidine 2% Cloths 1 Application(s) Topical daily  clopidogrel Tablet 75 milliGRAM(s) Oral daily  dextrose 5%. 1000 milliLiter(s) (100 mL/Hr) IV Continuous <Continuous>  dextrose 5%. 1000 milliLiter(s) (50 mL/Hr) IV Continuous <Continuous>  dextrose 50% Injectable 25 Gram(s) IV Push once  dextrose 50% Injectable 12.5 Gram(s) IV Push once  dextrose 50% Injectable 25 Gram(s) IV Push once  epoetin dereck-epbx (RETACRIT) Injectable 82533 Unit(s) IV Push <User Schedule>  folic acid 1 milliGRAM(s) Oral daily  gabapentin 300 milliGRAM(s) Oral three times a day  glucagon  Injectable 1 milliGRAM(s) IntraMuscular once  heparin   Injectable 5000 Unit(s) SubCutaneous every 12 hours  hydrALAZINE 50 milliGRAM(s) Oral three times a day  insulin lispro (ADMELOG) corrective regimen sliding scale   SubCutaneous three times a day before meals  insulin lispro (ADMELOG) corrective regimen sliding scale   SubCutaneous at bedtime  labetalol 200 milliGRAM(s) Oral two times a day  montelukast 10 milliGRAM(s) Oral daily  senna 2 Tablet(s) Oral at bedtime  vancomycin  IVPB 1250 milliGRAM(s) IV Intermittent <User Schedule>    MEDICATIONS  (PRN):  albuterol    90 MICROgram(s) HFA Inhaler 2 Puff(s) Inhalation every 6 hours PRN for shortness of breath and/or wheezing  aluminum hydroxide/magnesium hydroxide/simethicone Suspension 30 milliLiter(s) Oral every 4 hours PRN Dyspepsia  dextrose Oral Gel 15 Gram(s) Oral once PRN Blood Glucose LESS THAN 70 milliGRAM(s)/deciliter  HYDROmorphone   Tablet 2 milliGRAM(s) Oral every 6 hours PRN Moderate Pain (4 - 6)  HYDROmorphone   Tablet 4 milliGRAM(s) Oral every 6 hours PRN Severe Pain (7 - 10)  HYDROmorphone  Injectable 0.5 milliGRAM(s) IV Push every 6 hours PRN breakthrough pain  melatonin 3 milliGRAM(s) Oral at bedtime PRN Insomnia  naloxone Injectable 1 milliGRAM(s) IV Push once PRN For Opioid OD  ondansetron Injectable 4 milliGRAM(s) IV Push every 8 hours PRN Nausea and/or Vomiting

## 2025-03-01 NOTE — PROGRESS NOTE ADULT - ASSESSMENT
69 year old female with a history of hypertension, hyperlipidemia, DM II and ESRD admitted with back pain, and diagnosed with discitis/OM by MRI.      Plan:  Continue maintenance hemodialysis as per the patients schedule (Monday, Wednesday and Friday) as well as prn  ABx therapy per ID  [facility]    Diagnosis:  ESRD  discitis/OM  DMII  hypertension

## 2025-03-01 NOTE — PROGRESS NOTE ADULT - SUBJECTIVE AND OBJECTIVE BOX
Sherrie Tran M.D.    Patient is a 69y old  Female who presents with a chief complaint of + trop (27 Feb 2025 15:31)      SUBJECTIVE / OVERNIGHT EVENTS: no event overnight.     Patient denies chest pain, SOB, abd pain, N/V, fever, chills, dysuria or any other complaints. All remainder ROS negative.     MEDICATIONS  (STANDING):  acetaminophen     Tablet .. 975 milliGRAM(s) Oral every 8 hours  aspirin  chewable 81 milliGRAM(s) Oral daily  atorvastatin 20 milliGRAM(s) Oral at bedtime  cefepime  Injectable. 2000 milliGRAM(s) IV Push <User Schedule>  chlorhexidine 2% Cloths 1 Application(s) Topical daily  clopidogrel Tablet 75 milliGRAM(s) Oral daily  dextrose 5%. 1000 milliLiter(s) (100 mL/Hr) IV Continuous <Continuous>  dextrose 5%. 1000 milliLiter(s) (50 mL/Hr) IV Continuous <Continuous>  dextrose 50% Injectable 25 Gram(s) IV Push once  dextrose 50% Injectable 12.5 Gram(s) IV Push once  dextrose 50% Injectable 25 Gram(s) IV Push once  epoetin dereck-epbx (RETACRIT) Injectable 71920 Unit(s) IV Push <User Schedule>  folic acid 1 milliGRAM(s) Oral daily  gabapentin 300 milliGRAM(s) Oral three times a day  glucagon  Injectable 1 milliGRAM(s) IntraMuscular once  heparin   Injectable 5000 Unit(s) SubCutaneous every 12 hours  hydrALAZINE 50 milliGRAM(s) Oral three times a day  insulin lispro (ADMELOG) corrective regimen sliding scale   SubCutaneous three times a day before meals  insulin lispro (ADMELOG) corrective regimen sliding scale   SubCutaneous at bedtime  labetalol 200 milliGRAM(s) Oral two times a day  montelukast 10 milliGRAM(s) Oral daily  senna 2 Tablet(s) Oral at bedtime  vancomycin  IVPB 1250 milliGRAM(s) IV Intermittent <User Schedule>    MEDICATIONS  (PRN):  albuterol    90 MICROgram(s) HFA Inhaler 2 Puff(s) Inhalation every 6 hours PRN for shortness of breath and/or wheezing  aluminum hydroxide/magnesium hydroxide/simethicone Suspension 30 milliLiter(s) Oral every 4 hours PRN Dyspepsia  dextrose Oral Gel 15 Gram(s) Oral once PRN Blood Glucose LESS THAN 70 milliGRAM(s)/deciliter  HYDROmorphone   Tablet 2 milliGRAM(s) Oral every 6 hours PRN Moderate Pain (4 - 6)  HYDROmorphone   Tablet 4 milliGRAM(s) Oral every 6 hours PRN Severe Pain (7 - 10)  HYDROmorphone  Injectable 0.5 milliGRAM(s) IV Push every 6 hours PRN breakthrough pain  melatonin 3 milliGRAM(s) Oral at bedtime PRN Insomnia  naloxone Injectable 1 milliGRAM(s) IV Push once PRN For Opioid OD  ondansetron Injectable 4 milliGRAM(s) IV Push every 8 hours PRN Nausea and/or Vomiting      I&O's Summary    28 Feb 2025 07:01  -  01 Mar 2025 07:00  --------------------------------------------------------  IN: 180 mL / OUT: 500 mL / NET: -320 mL        PHYSICAL EXAM:  Vital Signs Last 24 Hrs  T(C): 36.7 (01 Mar 2025 10:54), Max: 37.1 (28 Feb 2025 17:01)  T(F): 98.1 (01 Mar 2025 10:54), Max: 98.7 (28 Feb 2025 17:01)  HR: 76 (01 Mar 2025 10:54) (67 - 81)  BP: 173/84 (01 Mar 2025 10:54) (90/50 - 173/84)  BP(mean): 114 (01 Mar 2025 10:54) (59 - 114)  RR: 20 (01 Mar 2025 10:54) (17 - 20)  SpO2: 100% (01 Mar 2025 10:54) (94% - 100%)    Parameters below as of 01 Mar 2025 10:54  Patient On (Oxygen Delivery Method): room air    CONSTITUTIONAL: NAD, well-groomed  RESPIRATORY: Normal respiratory effort; lungs are clear to auscultation bilaterally  CARDIOVASCULAR: Regular rate and rhythm, no LE edema  ABDOMEN: Nontender to palpation, normoactive bowel sounds  NEURO: Word finding difficulty. 2-3/5 of RLE (chronic per pt). No facial droop noted    LABS:                        9.3    5.58  )-----------( 335      ( 01 Mar 2025 05:12 )             31.9     03-01    133[L]  |  93[L]  |  25.9[H]  ----------------------------<  89  4.4   |  27.0  |  4.42[H]    Ca    9.3      01 Mar 2025 05:12  Phos  4.5     03-01  Mg     2.2     03-01    TPro  6.2[L]  /  Alb  3.3  /  TBili  0.3[L]  /  DBili  x   /  AST  15  /  ALT  9   /  AlkPhos  76  03-01          Urinalysis Basic - ( 01 Mar 2025 05:12 )    Color: x / Appearance: x / SG: x / pH: x  Gluc: 89 mg/dL / Ketone: x  / Bili: x / Urobili: x   Blood: x / Protein: x / Nitrite: x   Leuk Esterase: x / RBC: x / WBC x   Sq Epi: x / Non Sq Epi: x / Bacteria: x        CAPILLARY BLOOD GLUCOSE      POCT Blood Glucose.: 112 mg/dL (01 Mar 2025 11:07)  POCT Blood Glucose.: 92 mg/dL (01 Mar 2025 07:47)  POCT Blood Glucose.: 133 mg/dL (01 Mar 2025 00:22)  POCT Blood Glucose.: 85 mg/dL (28 Feb 2025 22:25)  POCT Blood Glucose.: 86 mg/dL (28 Feb 2025 16:46)      RADIOLOGY & ADDITIONAL TESTS:  Results Reviewed:   Imaging Personally Reviewed:  Electrocardiogram Personally Reviewed:

## 2025-03-02 LAB
ANION GAP SERPL CALC-SCNC: 14 MMOL/L — SIGNIFICANT CHANGE UP (ref 5–17)
BUN SERPL-MCNC: 37.9 MG/DL — HIGH (ref 8–20)
CALCIUM SERPL-MCNC: 8.9 MG/DL — SIGNIFICANT CHANGE UP (ref 8.4–10.5)
CHLORIDE SERPL-SCNC: 91 MMOL/L — LOW (ref 96–108)
CO2 SERPL-SCNC: 25 MMOL/L — SIGNIFICANT CHANGE UP (ref 22–29)
CREAT SERPL-MCNC: 6.06 MG/DL — HIGH (ref 0.5–1.3)
CULTURE RESULTS: SIGNIFICANT CHANGE UP
EGFR: 7 ML/MIN/1.73M2 — LOW
EGFR: 7 ML/MIN/1.73M2 — LOW
GLUCOSE BLDC GLUCOMTR-MCNC: 108 MG/DL — HIGH (ref 70–99)
GLUCOSE BLDC GLUCOMTR-MCNC: 113 MG/DL — HIGH (ref 70–99)
GLUCOSE BLDC GLUCOMTR-MCNC: 131 MG/DL — HIGH (ref 70–99)
GLUCOSE BLDC GLUCOMTR-MCNC: 159 MG/DL — HIGH (ref 70–99)
GLUCOSE SERPL-MCNC: 132 MG/DL — HIGH (ref 70–99)
HCT VFR BLD CALC: 31.7 % — LOW (ref 34.5–45)
HGB BLD-MCNC: 9.6 G/DL — LOW (ref 11.5–15.5)
MCHC RBC-ENTMCNC: 28 PG — SIGNIFICANT CHANGE UP (ref 27–34)
MCHC RBC-ENTMCNC: 30.3 G/DL — LOW (ref 32–36)
MCV RBC AUTO: 92.4 FL — SIGNIFICANT CHANGE UP (ref 80–100)
NRBC # BLD AUTO: 0 K/UL — SIGNIFICANT CHANGE UP (ref 0–0)
NRBC # FLD: 0 K/UL — SIGNIFICANT CHANGE UP (ref 0–0)
NRBC BLD AUTO-RTO: 0 /100 WBCS — SIGNIFICANT CHANGE UP (ref 0–0)
PLATELET # BLD AUTO: 345 K/UL — SIGNIFICANT CHANGE UP (ref 150–400)
PMV BLD: 10 FL — SIGNIFICANT CHANGE UP (ref 7–13)
POTASSIUM SERPL-MCNC: 4.8 MMOL/L — SIGNIFICANT CHANGE UP (ref 3.5–5.3)
POTASSIUM SERPL-SCNC: 4.8 MMOL/L — SIGNIFICANT CHANGE UP (ref 3.5–5.3)
RBC # BLD: 3.43 M/UL — LOW (ref 3.8–5.2)
RBC # FLD: 17.4 % — HIGH (ref 10.3–14.5)
SODIUM SERPL-SCNC: 130 MMOL/L — LOW (ref 135–145)
SPECIMEN SOURCE: SIGNIFICANT CHANGE UP
WBC # BLD: 5.41 K/UL — SIGNIFICANT CHANGE UP (ref 3.8–10.5)
WBC # FLD AUTO: 5.41 K/UL — SIGNIFICANT CHANGE UP (ref 3.8–10.5)

## 2025-03-02 PROCEDURE — 99232 SBSQ HOSP IP/OBS MODERATE 35: CPT

## 2025-03-02 PROCEDURE — 99233 SBSQ HOSP IP/OBS HIGH 50: CPT

## 2025-03-02 RX ADMIN — GABAPENTIN 300 MILLIGRAM(S): 400 CAPSULE ORAL at 13:24

## 2025-03-02 RX ADMIN — LABETALOL HYDROCHLORIDE 200 MILLIGRAM(S): 200 TABLET, FILM COATED ORAL at 05:08

## 2025-03-02 RX ADMIN — HEPARIN SODIUM 5000 UNIT(S): 1000 INJECTION INTRAVENOUS; SUBCUTANEOUS at 13:25

## 2025-03-02 RX ADMIN — Medication 975 MILLIGRAM(S): at 05:08

## 2025-03-02 RX ADMIN — Medication 975 MILLIGRAM(S): at 21:08

## 2025-03-02 RX ADMIN — Medication 2 TABLET(S): at 21:08

## 2025-03-02 RX ADMIN — MONTELUKAST SODIUM 10 MILLIGRAM(S): 10 TABLET ORAL at 13:24

## 2025-03-02 RX ADMIN — GABAPENTIN 300 MILLIGRAM(S): 400 CAPSULE ORAL at 05:08

## 2025-03-02 RX ADMIN — ATORVASTATIN CALCIUM 40 MILLIGRAM(S): 80 TABLET, FILM COATED ORAL at 21:08

## 2025-03-02 RX ADMIN — Medication 975 MILLIGRAM(S): at 13:24

## 2025-03-02 RX ADMIN — LABETALOL HYDROCHLORIDE 200 MILLIGRAM(S): 200 TABLET, FILM COATED ORAL at 17:22

## 2025-03-02 RX ADMIN — HEPARIN SODIUM 5000 UNIT(S): 1000 INJECTION INTRAVENOUS; SUBCUTANEOUS at 21:06

## 2025-03-02 RX ADMIN — Medication 975 MILLIGRAM(S): at 06:08

## 2025-03-02 RX ADMIN — Medication 975 MILLIGRAM(S): at 14:24

## 2025-03-02 RX ADMIN — Medication 1 APPLICATION(S): at 13:25

## 2025-03-02 RX ADMIN — INSULIN LISPRO 1: 100 INJECTION, SOLUTION INTRAVENOUS; SUBCUTANEOUS at 17:22

## 2025-03-02 RX ADMIN — Medication 81 MILLIGRAM(S): at 13:24

## 2025-03-02 RX ADMIN — CLOPIDOGREL BISULFATE 75 MILLIGRAM(S): 75 TABLET, FILM COATED ORAL at 13:24

## 2025-03-02 RX ADMIN — Medication 975 MILLIGRAM(S): at 22:08

## 2025-03-02 NOTE — PROGRESS NOTE ADULT - SUBJECTIVE AND OBJECTIVE BOX
Progress reviewed and  patient was seen and evaluated in her room, awake and aler, in no distress, and denies chest pain, shortness of breath, nausea, vomiting and diarrhea, she has however noted increasing hand tremors of unclear etiology    Vital Signs Last 24 Hrs  T(C): 36.7 (02 Mar 2025 13:20), Max: 36.7 (01 Mar 2025 21:17)  T(F): 98 (02 Mar 2025 13:20), Max: 98 (01 Mar 2025 21:17)  HR: 71 (02 Mar 2025 13:20) (63 - 71)  BP: 111/65 (02 Mar 2025 13:20) (103/47 - 167/65)  BP(mean): --  ABP: --  ABP(mean): --  RR: 13 (02 Mar 2025 13:20) (13 - 18)  SpO2: 100% (02 Mar 2025 13:20) (96% - 100%)    O2 Parameters below as of 02 Mar 2025 13:20  Patient On (Oxygen Delivery Method): room air    I&O's Summary        On exam:  Awake and alert, in no distress  (-)JVD  Lungs: bilateral decreased air entry, CTA  Heart: S1, S2 no aud rubs, gallops  S1,S2 no aud rubs, gallops  Abd soft  EXT minimal edema  03-02    130[L]  |  91[L]  |  37.9[H]  ----------------------------<  132[H]  4.8   |  25.0  |  6.06[H]    Ca    8.9      02 Mar 2025 05:26  Phos  4.5     03-01  Mg     2.2     03-01    TPro  6.2[L]  /  Alb  3.3  /  TBili  0.3[L]  /  DBili  x   /  AST  15  /  ALT  9   /  AlkPhos  76  03-01                          9.6    5.41  )-----------( 345      ( 02 Mar 2025 05:26 )             31.7   MEDICATIONS  (STANDING):  acetaminophen     Tablet .. 975 milliGRAM(s) Oral every 8 hours  aspirin  chewable 81 milliGRAM(s) Oral daily  atorvastatin 40 milliGRAM(s) Oral at bedtime  cefepime  Injectable. 2000 milliGRAM(s) IV Push <User Schedule>  chlorhexidine 2% Cloths 1 Application(s) Topical daily  clopidogrel Tablet 75 milliGRAM(s) Oral daily  dextrose 5%. 1000 milliLiter(s) (100 mL/Hr) IV Continuous <Continuous>  dextrose 5%. 1000 milliLiter(s) (50 mL/Hr) IV Continuous <Continuous>  dextrose 50% Injectable 25 Gram(s) IV Push once  dextrose 50% Injectable 12.5 Gram(s) IV Push once  dextrose 50% Injectable 25 Gram(s) IV Push once  epoetin dereck-epbx (RETACRIT) Injectable 42462 Unit(s) IV Push <User Schedule>  folic acid 1 milliGRAM(s) Oral daily  gabapentin 300 milliGRAM(s) Oral three times a day  glucagon  Injectable 1 milliGRAM(s) IntraMuscular once  heparin   Injectable 5000 Unit(s) SubCutaneous every 12 hours  hydrALAZINE 50 milliGRAM(s) Oral three times a day  insulin lispro (ADMELOG) corrective regimen sliding scale   SubCutaneous three times a day before meals  insulin lispro (ADMELOG) corrective regimen sliding scale   SubCutaneous at bedtime  labetalol 200 milliGRAM(s) Oral two times a day  montelukast 10 milliGRAM(s) Oral daily  senna 2 Tablet(s) Oral at bedtime  vancomycin  IVPB 1250 milliGRAM(s) IV Intermittent <User Schedule>    MEDICATIONS  (PRN):  albuterol    90 MICROgram(s) HFA Inhaler 2 Puff(s) Inhalation every 6 hours PRN for shortness of breath and/or wheezing  aluminum hydroxide/magnesium hydroxide/simethicone Suspension 30 milliLiter(s) Oral every 4 hours PRN Dyspepsia  dextrose Oral Gel 15 Gram(s) Oral once PRN Blood Glucose LESS THAN 70 milliGRAM(s)/deciliter  HYDROmorphone   Tablet 2 milliGRAM(s) Oral every 6 hours PRN Moderate Pain (4 - 6)  HYDROmorphone   Tablet 4 milliGRAM(s) Oral every 6 hours PRN Severe Pain (7 - 10)  HYDROmorphone  Injectable 0.5 milliGRAM(s) IV Push every 6 hours PRN breakthrough pain  melatonin 3 milliGRAM(s) Oral at bedtime PRN Insomnia  naloxone Injectable 1 milliGRAM(s) IV Push once PRN For Opioid OD  ondansetron Injectable 4 milliGRAM(s) IV Push every 8 hours PRN Nausea and/or Vomiting

## 2025-03-02 NOTE — PROGRESS NOTE ADULT - ASSESSMENT
68 y/o female with PMH of HTN, HLD, ESRD on HD (M/W/F), DM-2 came to the ED complaining of low back pain.Found to have Discitis/OM of L4-5 on mri spine,ct lumbar spine.Per Ortho - Offered surgery but patient wants conservative management. asa/plavix held  for 5 days for biospy. .S/P Biopsy by IR on 02/25. start cefepime and vancomycin with HD, monitor trough Per ID   f/u biopsy ngtd. f/u id rec about abx duration. ABX WITH hd. Code stroke was called on 02/27 for rt facial droop, difficulty words finding.CT head no acute stroke. MRI brain, MRA brain/neck w/o contrast per neurology.     #R/o CVA  Code stroke called on 02/27  pt unable to express words  stat CTH no acute stroke  Neurocheck q4hr   MRI brain, MRA brain/neck w/o contrast without acute CVA  ASA, plavix, statin increase to 40mg  neuro recs danny  carotid us noted calcified plaque within the proximal left ICA with a peak systolic velocity of 196 cm/s consistent with a 50-69% stenosis, outpt vascular followup  PT/OT eval pending      #Acute on chronic low back pain 2/2 Discitis/OM of L4-5  CT lumbar degenerative disc changes at L4-L5 concerning for discitis/OM   MRI with OM/Discitis of L4/5  S/P biopsy by IR on 02/25  cw cefepime and vancomycin with HD per ID  Biopsy ngtd  Blood cx neg    Pain regimen  Eval by Ortho - Patient wants conservative management     #Elevated troponin likely from CKD  EKG, TTE reviewed with   Continue DAPT   No chest pain  Tele    #Right knee pain   XR knee: lateral prosthetic patellar dislocation  Seen by ortho  Pain control  PT eval     #ESRD   On HD (M/W/F)   Nephrology following     #HTN/HLD/CAD   Aspirin /plavix 75mg    Lipitor 40mg QD  Labetalol 200mg bid   Hydralazine 50mg     #Hyperglycemia with DM-2   Patient not on medication   HbA1C: 5.5 (07/24/23)   Insulin sliding scale   A1C of 6.9    DVT prophylaxis: Heparin sc  Dispo: Neuro workup and ID recs for dispo, likely dc to [facility]    PT eval pending

## 2025-03-02 NOTE — PROGRESS NOTE ADULT - SUBJECTIVE AND OBJECTIVE BOX
Sherrie Tran M.D.    Patient is a 69y old  Female who presents with a chief complaint of Back pain (01 Mar 2025 16:16)      SUBJECTIVE / OVERNIGHT EVENTS:    Patient denies chest pain, SOB, abd pain, N/V, fever, chills, dysuria or any other complaints. All remainder ROS negative.     MEDICATIONS  (STANDING):  acetaminophen     Tablet .. 975 milliGRAM(s) Oral every 8 hours  aspirin  chewable 81 milliGRAM(s) Oral daily  atorvastatin 40 milliGRAM(s) Oral at bedtime  cefepime  Injectable. 2000 milliGRAM(s) IV Push <User Schedule>  chlorhexidine 2% Cloths 1 Application(s) Topical daily  clopidogrel Tablet 75 milliGRAM(s) Oral daily  dextrose 5%. 1000 milliLiter(s) (50 mL/Hr) IV Continuous <Continuous>  dextrose 5%. 1000 milliLiter(s) (100 mL/Hr) IV Continuous <Continuous>  dextrose 50% Injectable 25 Gram(s) IV Push once  dextrose 50% Injectable 12.5 Gram(s) IV Push once  dextrose 50% Injectable 25 Gram(s) IV Push once  epoetin dereck-epbx (RETACRIT) Injectable 90907 Unit(s) IV Push <User Schedule>  folic acid 1 milliGRAM(s) Oral daily  gabapentin 300 milliGRAM(s) Oral three times a day  glucagon  Injectable 1 milliGRAM(s) IntraMuscular once  heparin   Injectable 5000 Unit(s) SubCutaneous every 12 hours  hydrALAZINE 50 milliGRAM(s) Oral three times a day  insulin lispro (ADMELOG) corrective regimen sliding scale   SubCutaneous three times a day before meals  insulin lispro (ADMELOG) corrective regimen sliding scale   SubCutaneous at bedtime  labetalol 200 milliGRAM(s) Oral two times a day  montelukast 10 milliGRAM(s) Oral daily  senna 2 Tablet(s) Oral at bedtime  vancomycin  IVPB 1250 milliGRAM(s) IV Intermittent <User Schedule>    MEDICATIONS  (PRN):  albuterol    90 MICROgram(s) HFA Inhaler 2 Puff(s) Inhalation every 6 hours PRN for shortness of breath and/or wheezing  aluminum hydroxide/magnesium hydroxide/simethicone Suspension 30 milliLiter(s) Oral every 4 hours PRN Dyspepsia  dextrose Oral Gel 15 Gram(s) Oral once PRN Blood Glucose LESS THAN 70 milliGRAM(s)/deciliter  HYDROmorphone   Tablet 4 milliGRAM(s) Oral every 6 hours PRN Severe Pain (7 - 10)  HYDROmorphone   Tablet 2 milliGRAM(s) Oral every 6 hours PRN Moderate Pain (4 - 6)  HYDROmorphone  Injectable 0.5 milliGRAM(s) IV Push every 6 hours PRN breakthrough pain  melatonin 3 milliGRAM(s) Oral at bedtime PRN Insomnia  naloxone Injectable 1 milliGRAM(s) IV Push once PRN For Opioid OD  ondansetron Injectable 4 milliGRAM(s) IV Push every 8 hours PRN Nausea and/or Vomiting      I&O's Summary      PHYSICAL EXAM:  Vital Signs Last 24 Hrs  T(C): 36.7 (02 Mar 2025 11:11), Max: 36.7 (01 Mar 2025 21:17)  T(F): 98 (02 Mar 2025 11:11), Max: 98 (01 Mar 2025 21:17)  HR: 66 (02 Mar 2025 11:11) (63 - 71)  BP: 146/70 (02 Mar 2025 11:11) (103/47 - 167/65)  BP(mean): --  RR: 16 (02 Mar 2025 11:11) (14 - 18)  SpO2: 100% (02 Mar 2025 11:11) (96% - 100%)    Parameters below as of 02 Mar 2025 11:11  Patient On (Oxygen Delivery Method): room air        CONSTITUTIONAL: NAD, well-groomed  ENMT: Moist oral mucosa, no pharyngeal injection or exudates; normal dentition  RESPIRATORY: Normal respiratory effort; lungs are clear to auscultation bilaterally  CARDIOVASCULAR: Regular rate and rhythm, normal S1 and S2, no murmur/rub/gallop; No lower extremity edema; Peripheral pulses are 2+ bilaterally  ABDOMEN: Nontender to palpation, normoactive bowel sounds, no rebound/guarding; No hepatosplenomegaly  MUSCLOSKELETAL:  Normal gait; no clubbing or cyanosis of digits; no joint swelling or tenderness to palpation  PSYCH: A+O x3; affect appropriate  NEUROLOGY: CN 2-12 are intact and symmetric; no gross sensory deficits;   SKIN: No rashes; no palpable lesions    LABS:                        9.6    5.41  )-----------( 345      ( 02 Mar 2025 05:26 )             31.7     03-02    130[L]  |  91[L]  |  37.9[H]  ----------------------------<  132[H]  4.8   |  25.0  |  6.06[H]    Ca    8.9      02 Mar 2025 05:26  Phos  4.5     03-01  Mg     2.2     03-01    TPro  6.2[L]  /  Alb  3.3  /  TBili  0.3[L]  /  DBili  x   /  AST  15  /  ALT  9   /  AlkPhos  76  03-01          Urinalysis Basic - ( 02 Mar 2025 05:26 )    Color: x / Appearance: x / SG: x / pH: x  Gluc: 132 mg/dL / Ketone: x  / Bili: x / Urobili: x   Blood: x / Protein: x / Nitrite: x   Leuk Esterase: x / RBC: x / WBC x   Sq Epi: x / Non Sq Epi: x / Bacteria: x        CAPILLARY BLOOD GLUCOSE      POCT Blood Glucose.: 108 mg/dL (02 Mar 2025 11:10)  POCT Blood Glucose.: 113 mg/dL (02 Mar 2025 07:41)  POCT Blood Glucose.: 188 mg/dL (01 Mar 2025 21:22)  POCT Blood Glucose.: 126 mg/dL (01 Mar 2025 17:09)      RADIOLOGY & ADDITIONAL TESTS:  Results Reviewed:   Imaging Personally Reviewed:  Electrocardiogram Personally Reviewed: Sherrie Tran M.D.    Patient is a 69y old  Female who presents with a chief complaint of Back pain (01 Mar 2025 16:16)      SUBJECTIVE / OVERNIGHT EVENTS: still with word finding difficulties.     Patient denies chest pain, SOB, abd pain, N/V, fever, chills, dysuria or any other complaints. All remainder ROS negative.     MEDICATIONS  (STANDING):  acetaminophen     Tablet .. 975 milliGRAM(s) Oral every 8 hours  aspirin  chewable 81 milliGRAM(s) Oral daily  atorvastatin 40 milliGRAM(s) Oral at bedtime  cefepime  Injectable. 2000 milliGRAM(s) IV Push <User Schedule>  chlorhexidine 2% Cloths 1 Application(s) Topical daily  clopidogrel Tablet 75 milliGRAM(s) Oral daily  dextrose 5%. 1000 milliLiter(s) (50 mL/Hr) IV Continuous <Continuous>  dextrose 5%. 1000 milliLiter(s) (100 mL/Hr) IV Continuous <Continuous>  dextrose 50% Injectable 25 Gram(s) IV Push once  dextrose 50% Injectable 12.5 Gram(s) IV Push once  dextrose 50% Injectable 25 Gram(s) IV Push once  epoetin dereck-epbx (RETACRIT) Injectable 10491 Unit(s) IV Push <User Schedule>  folic acid 1 milliGRAM(s) Oral daily  gabapentin 300 milliGRAM(s) Oral three times a day  glucagon  Injectable 1 milliGRAM(s) IntraMuscular once  heparin   Injectable 5000 Unit(s) SubCutaneous every 12 hours  hydrALAZINE 50 milliGRAM(s) Oral three times a day  insulin lispro (ADMELOG) corrective regimen sliding scale   SubCutaneous three times a day before meals  insulin lispro (ADMELOG) corrective regimen sliding scale   SubCutaneous at bedtime  labetalol 200 milliGRAM(s) Oral two times a day  montelukast 10 milliGRAM(s) Oral daily  senna 2 Tablet(s) Oral at bedtime  vancomycin  IVPB 1250 milliGRAM(s) IV Intermittent <User Schedule>    MEDICATIONS  (PRN):  albuterol    90 MICROgram(s) HFA Inhaler 2 Puff(s) Inhalation every 6 hours PRN for shortness of breath and/or wheezing  aluminum hydroxide/magnesium hydroxide/simethicone Suspension 30 milliLiter(s) Oral every 4 hours PRN Dyspepsia  dextrose Oral Gel 15 Gram(s) Oral once PRN Blood Glucose LESS THAN 70 milliGRAM(s)/deciliter  HYDROmorphone   Tablet 4 milliGRAM(s) Oral every 6 hours PRN Severe Pain (7 - 10)  HYDROmorphone   Tablet 2 milliGRAM(s) Oral every 6 hours PRN Moderate Pain (4 - 6)  HYDROmorphone  Injectable 0.5 milliGRAM(s) IV Push every 6 hours PRN breakthrough pain  melatonin 3 milliGRAM(s) Oral at bedtime PRN Insomnia  naloxone Injectable 1 milliGRAM(s) IV Push once PRN For Opioid OD  ondansetron Injectable 4 milliGRAM(s) IV Push every 8 hours PRN Nausea and/or Vomiting      I&O's Summary      PHYSICAL EXAM:  Vital Signs Last 24 Hrs  T(C): 36.7 (02 Mar 2025 11:11), Max: 36.7 (01 Mar 2025 21:17)  T(F): 98 (02 Mar 2025 11:11), Max: 98 (01 Mar 2025 21:17)  HR: 66 (02 Mar 2025 11:11) (63 - 71)  BP: 146/70 (02 Mar 2025 11:11) (103/47 - 167/65)  BP(mean): --  RR: 16 (02 Mar 2025 11:11) (14 - 18)  SpO2: 100% (02 Mar 2025 11:11) (96% - 100%)    Parameters below as of 02 Mar 2025 11:11  Patient On (Oxygen Delivery Method): room air      CONSTITUTIONAL: NAD, well-groomed  RESPIRATORY: Normal respiratory effort; lungs are clear to auscultation bilaterally  CARDIOVASCULAR: Regular rate and rhythm, no LE edema  ABDOMEN: Nontender to palpation, normoactive bowel sounds  NEURO: Word finding difficulty. 2-3/5 of RLE (chronic per pt). No facial droop noted    LABS:                        9.6    5.41  )-----------( 345      ( 02 Mar 2025 05:26 )             31.7     03-02    130[L]  |  91[L]  |  37.9[H]  ----------------------------<  132[H]  4.8   |  25.0  |  6.06[H]    Ca    8.9      02 Mar 2025 05:26  Phos  4.5     03-01  Mg     2.2     03-01    TPro  6.2[L]  /  Alb  3.3  /  TBili  0.3[L]  /  DBili  x   /  AST  15  /  ALT  9   /  AlkPhos  76  03-01        Urinalysis Basic - ( 02 Mar 2025 05:26 )    Color: x / Appearance: x / SG: x / pH: x  Gluc: 132 mg/dL / Ketone: x  / Bili: x / Urobili: x   Blood: x / Protein: x / Nitrite: x   Leuk Esterase: x / RBC: x / WBC x   Sq Epi: x / Non Sq Epi: x / Bacteria: x        CAPILLARY BLOOD GLUCOSE      POCT Blood Glucose.: 108 mg/dL (02 Mar 2025 11:10)  POCT Blood Glucose.: 113 mg/dL (02 Mar 2025 07:41)  POCT Blood Glucose.: 188 mg/dL (01 Mar 2025 21:22)  POCT Blood Glucose.: 126 mg/dL (01 Mar 2025 17:09)      RADIOLOGY & ADDITIONAL TESTS:  Results Reviewed:   Imaging Personally Reviewed:  Electrocardiogram Personally Reviewed:

## 2025-03-02 NOTE — CHART NOTE - NSCHARTNOTEFT_GEN_A_CORE
MR Head No Cont (03.01.25 @ 10:18)   IMPRESSION:  MRI BRAIN: No acute infarction.  MRA BRAIN: No large vessel occlusion.  MRA NECK: Mild left carotid bulb and proximal internal carotid artery   stenosis.    MRI brain negative for stroke. Etiology likely metabolic encephalopathy in setting of renal failure with possible recrudescence of prior stroke symptoms.  Her asterixis and occasional myoclonic jerks are likely to be related to gabapentin in setting of ESRD.    Can follow up with outpatient neurologist for left ICA stenosis monitoring    Stroke team to sign off

## 2025-03-02 NOTE — PROGRESS NOTE ADULT - ASSESSMENT
69 year old female with a history of hypertension, hyperlipidemia, DM II and ESRD admitted with back pain, and diagnosed with discitis/OM by MRI.  In house complicated by increasing tremor, likely te result of gabapentine toxicity      Plan:  Discontinue gabapentine  Continue maintenance hemodialysis as per the patients schedule (Monday, Wednesday and Friday) as well as prn  ABx therapy per ID  Plan as per Primary care team    Diagnosis:  ESRD  discitis/OM  DMII  hypertension

## 2025-03-03 LAB
ANION GAP SERPL CALC-SCNC: 15 MMOL/L — SIGNIFICANT CHANGE UP (ref 5–17)
BUN SERPL-MCNC: 52.4 MG/DL — HIGH (ref 8–20)
CALCIUM SERPL-MCNC: 9.2 MG/DL — SIGNIFICANT CHANGE UP (ref 8.4–10.5)
CHLORIDE SERPL-SCNC: 92 MMOL/L — LOW (ref 96–108)
CO2 SERPL-SCNC: 25 MMOL/L — SIGNIFICANT CHANGE UP (ref 22–29)
CREAT SERPL-MCNC: 7.33 MG/DL — HIGH (ref 0.5–1.3)
EGFR: 6 ML/MIN/1.73M2 — LOW
EGFR: 6 ML/MIN/1.73M2 — LOW
GLUCOSE BLDC GLUCOMTR-MCNC: 73 MG/DL — SIGNIFICANT CHANGE UP (ref 70–99)
GLUCOSE BLDC GLUCOMTR-MCNC: 73 MG/DL — SIGNIFICANT CHANGE UP (ref 70–99)
GLUCOSE BLDC GLUCOMTR-MCNC: 98 MG/DL — SIGNIFICANT CHANGE UP (ref 70–99)
GLUCOSE BLDC GLUCOMTR-MCNC: 99 MG/DL — SIGNIFICANT CHANGE UP (ref 70–99)
GLUCOSE SERPL-MCNC: 99 MG/DL — SIGNIFICANT CHANGE UP (ref 70–99)
HAV IGM SER-ACNC: SIGNIFICANT CHANGE UP
HBV CORE IGM SER-ACNC: SIGNIFICANT CHANGE UP
HBV SURFACE AG SER-ACNC: SIGNIFICANT CHANGE UP
HCT VFR BLD CALC: 34.1 % — LOW (ref 34.5–45)
HCV AB S/CO SERPL IA: 0.35 S/CO — SIGNIFICANT CHANGE UP (ref 0–0.79)
HCV AB SERPL-IMP: SIGNIFICANT CHANGE UP
HGB BLD-MCNC: 10 G/DL — LOW (ref 11.5–15.5)
MCHC RBC-ENTMCNC: 27.4 PG — SIGNIFICANT CHANGE UP (ref 27–34)
MCHC RBC-ENTMCNC: 29.3 G/DL — LOW (ref 32–36)
MCV RBC AUTO: 93.4 FL — SIGNIFICANT CHANGE UP (ref 80–100)
NRBC # BLD AUTO: 0 K/UL — SIGNIFICANT CHANGE UP (ref 0–0)
NRBC # FLD: 0 K/UL — SIGNIFICANT CHANGE UP (ref 0–0)
NRBC BLD AUTO-RTO: 0 /100 WBCS — SIGNIFICANT CHANGE UP (ref 0–0)
PLATELET # BLD AUTO: 353 K/UL — SIGNIFICANT CHANGE UP (ref 150–400)
PMV BLD: 10.2 FL — SIGNIFICANT CHANGE UP (ref 7–13)
POTASSIUM SERPL-MCNC: 5.6 MMOL/L — HIGH (ref 3.5–5.3)
POTASSIUM SERPL-SCNC: 5.6 MMOL/L — HIGH (ref 3.5–5.3)
RBC # BLD: 3.65 M/UL — LOW (ref 3.8–5.2)
RBC # FLD: 17.3 % — HIGH (ref 10.3–14.5)
SODIUM SERPL-SCNC: 132 MMOL/L — LOW (ref 135–145)
VANCOMYCIN FLD-MCNC: 16.6 UG/ML — SIGNIFICANT CHANGE UP
WBC # BLD: 6.56 K/UL — SIGNIFICANT CHANGE UP (ref 3.8–10.5)
WBC # FLD AUTO: 6.56 K/UL — SIGNIFICANT CHANGE UP (ref 3.8–10.5)

## 2025-03-03 PROCEDURE — 90935 HEMODIALYSIS ONE EVALUATION: CPT

## 2025-03-03 PROCEDURE — G0545: CPT

## 2025-03-03 PROCEDURE — 99232 SBSQ HOSP IP/OBS MODERATE 35: CPT

## 2025-03-03 PROCEDURE — 99231 SBSQ HOSP IP/OBS SF/LOW 25: CPT

## 2025-03-03 RX ORDER — VANCOMYCIN HCL IN 5 % DEXTROSE 1.5G/250ML
750 PLASTIC BAG, INJECTION (ML) INTRAVENOUS
Refills: 0 | Status: DISCONTINUED | OUTPATIENT
Start: 2025-03-03 | End: 2025-03-04

## 2025-03-03 RX ORDER — SODIUM ZIRCONIUM CYCLOSILICATE 5 G/5G
5 POWDER, FOR SUSPENSION ORAL ONCE
Refills: 0 | Status: COMPLETED | OUTPATIENT
Start: 2025-03-03 | End: 2025-03-03

## 2025-03-03 RX ADMIN — Medication 81 MILLIGRAM(S): at 14:35

## 2025-03-03 RX ADMIN — Medication 1 APPLICATION(S): at 14:37

## 2025-03-03 RX ADMIN — ATORVASTATIN CALCIUM 40 MILLIGRAM(S): 80 TABLET, FILM COATED ORAL at 22:15

## 2025-03-03 RX ADMIN — Medication 975 MILLIGRAM(S): at 06:13

## 2025-03-03 RX ADMIN — HEPARIN SODIUM 5000 UNIT(S): 1000 INJECTION INTRAVENOUS; SUBCUTANEOUS at 22:16

## 2025-03-03 RX ADMIN — Medication 50 MILLIGRAM(S): at 05:14

## 2025-03-03 RX ADMIN — HEPARIN SODIUM 5000 UNIT(S): 1000 INJECTION INTRAVENOUS; SUBCUTANEOUS at 14:34

## 2025-03-03 RX ADMIN — LABETALOL HYDROCHLORIDE 200 MILLIGRAM(S): 200 TABLET, FILM COATED ORAL at 18:38

## 2025-03-03 RX ADMIN — SODIUM ZIRCONIUM CYCLOSILICATE 5 GRAM(S): 5 POWDER, FOR SUSPENSION ORAL at 08:35

## 2025-03-03 RX ADMIN — Medication 250 MILLIGRAM(S): at 18:40

## 2025-03-03 RX ADMIN — Medication 50 MILLIGRAM(S): at 22:16

## 2025-03-03 RX ADMIN — CLOPIDOGREL BISULFATE 75 MILLIGRAM(S): 75 TABLET, FILM COATED ORAL at 14:35

## 2025-03-03 RX ADMIN — Medication 975 MILLIGRAM(S): at 14:35

## 2025-03-03 RX ADMIN — MONTELUKAST SODIUM 10 MILLIGRAM(S): 10 TABLET ORAL at 14:35

## 2025-03-03 RX ADMIN — Medication 975 MILLIGRAM(S): at 23:28

## 2025-03-03 RX ADMIN — Medication 2 TABLET(S): at 22:15

## 2025-03-03 RX ADMIN — Medication 50 MILLIGRAM(S): at 14:35

## 2025-03-03 RX ADMIN — Medication 975 MILLIGRAM(S): at 22:16

## 2025-03-03 RX ADMIN — EPOETIN ALFA 10000 UNIT(S): 10000 SOLUTION INTRAVENOUS; SUBCUTANEOUS at 11:05

## 2025-03-03 RX ADMIN — CEFEPIME 2000 MILLIGRAM(S): 2 INJECTION, POWDER, FOR SOLUTION INTRAVENOUS at 18:37

## 2025-03-03 RX ADMIN — Medication 975 MILLIGRAM(S): at 15:35

## 2025-03-03 RX ADMIN — LABETALOL HYDROCHLORIDE 200 MILLIGRAM(S): 200 TABLET, FILM COATED ORAL at 05:14

## 2025-03-03 RX ADMIN — Medication 975 MILLIGRAM(S): at 05:13

## 2025-03-03 NOTE — PROGRESS NOTE ADULT - SUBJECTIVE AND OBJECTIVE BOX
Pt Name: EULOGIO NIELSEN  MRN: [id number]    69F w/ L4-5 discitis. Patient reports lower back pain when ambulating with PT. The patient is participating in physical therapy. Denies nausea, vomiting, chest pain, shortness of breath, abdominal pain or fever. No new orthopedic complaints.    PAST MEDICAL & SURGICAL HISTORY:  CAD (coronary artery disease)  CKD (chronic kidney disease) requiring chronic dialysis  Type 2 diabetes mellitus  Diabetic retinopathy associated with diabetes mellitus due to underlying condition  Hypertension  HLD (hyperlipidemia)  CVA (cerebral vascular accident)    Asthma  S/P CABG (coronary artery bypass graft)  S/P coronary artery stent placement  (1)   S/P cataract extraction  OS with retained lens fragment 7/15/15  Acute hemodialysis patient  nothing in the left arm awaiting AV fistula placement - temporary cath in right upper chest quadrant  S/P  section  S/P cholecystectomy    Allergies: lip swelling with lobster (Swelling)  erythromycin (Vomiting)  shellfish (Swelling; Hives)    Vital Signs Last 24 Hrs  T(C): 36.6 (03 Mar 2025 07:40), Max: 36.7 (02 Mar 2025 11:11)  T(F): 97.9 (03 Mar 2025 07:40), Max: 98 (02 Mar 2025 11:11)  HR: 65 (03 Mar 2025 07:40) (65 - 75)  BP: 145/68 (03 Mar 2025 07:40) (101/70 - 162/60)  BP(mean): --  RR: 14 (03 Mar 2025 07:40) (13 - 18)  SpO2: 100% (03 Mar 2025 07:40) (97% - 100%)    Parameters below as of 03 Mar 2025 07:40  Patient On (Oxygen Delivery Method): room air    Physical Exam:  Appearance: Alert, responsive, in no acute distress.  Skin: no rash on visible skin. Skin is clean, dry and intact. No bleeding. No abrasions. No ulcerations.  Musculoskeletal:         Left Lower Extremity: Sensation is grossly intact to light touch. 2+ distal pulses. Cap refill < 2 sec.      Right Lower Extremity: Sensation is grossly intact to light touch. 2+ distal pulses. Cap refill < 2 sec.            Motor exam: [  ]          [ ] Lower extremity            HF(l2)    KE(l3)   TA(l4)  EHL(l5)    GS(s1)                                                 R        5/5        5/5        5/5       5/5         5/5                                               L             A/P:  Pt is a  69y Female being followed for L4-L5 discitis    • DVT prophylaxis as prescribed, including use of compression devices and ankle pumps  • Continue physical therapy  • Out of Bed as tolerated with assistance of a walker encouraged  • Abx as per ID  • Pain control as clinically indicated  • No surgical intervention at this time

## 2025-03-03 NOTE — PROGRESS NOTE ADULT - TIME BILLING
chart review, patient eval
Time spent reviewing the chart documentation, reviewing labs and imaging studies, evaluating the patient, discussing the plan of care with the medical team and/or all necessary consultants, and documenting.
chart review, patient eval

## 2025-03-03 NOTE — PROGRESS NOTE ADULT - ASSESSMENT
70 y/o female with PMH of HTN, HLD, ESRD on HD (M/W/F), DM-2 came to the ED complaining of low back pain. Pain has been going on for few days; described as aching, radiating to both thighs anteriorly. Patient reported chronic back pain but in the past few days it has worsened; bending forward/sitting on the side exacerbate the pain. She has no trauma to the back, fall, fever, chills, nausea, vomiting, abdominal pain, change in bowel habit, chest pain, shortness of breath.    ID called for MRI findings reporting L4-L5 discitis/OM    - no fever  - reports low back pain for at least 2 years, now worsening since last week prior to arrival  - recent flu but otherwise no infections, no dental work  - now prior infections of AVG  - has had RT knee revision in the past but no infections  - MRI LS spine= Discitis/osteomyelitis at L4-L5 with 5 mm anterolisthesis. ? severe spondylosis due to brown tumor  - patient not interested in orthopedic intervention  - mrsa screen neg  - bcx ngtd  - TTE no vegetations  - trend ESr CRP  - s/p IR disc biopsy, cultures negative so far  - plan for 6 weeks of IV abx with HD  - c/w cefepime 2g post HD  and vancomycin 750mg post HD Monday Wednesday Friday end 4/9/25. weekly CBc CMP ESR CRP vanco trough (goal trough 15-20)  - f/u ortho outpatient for repeat MRI    - Trend Fever  - Trend Leukocytosis    d/w  pharmacy, Dr Tran

## 2025-03-03 NOTE — PROGRESS NOTE ADULT - SUBJECTIVE AND OBJECTIVE BOX
[facility]                                                INFECTIOUS DISEASES  =======================================================                   Pascual Lopez#   Declan Kevin MD#    Jing Ritter MD*                           Natalia Tamayo MD*   Sylvia Mo MD*   Iva Akbar *           Diplomates American Board of Internal Medicine & Infectious Diseases                  # [place] Office - Appt - Tel  [phone] Fax [phone]                * [place] Office - Appt - Tel [phone] Fax [phone]                                  Hospital Consult line:  [phone]  =======================================================      [facility]-[id number]  EULOIGO NIELSEN   follow up for: discitis  back pain better  patient seen and examined.       I have personally reviewed the labs and data; pertinent labs and data are listed in this note; please see below.   ===================================================  REVIEW OF SYSTEMS:  CONSTITUTIONAL:  No Fever or chills  HEENT:  No diplopia or blurred vision.  No earache, sore throat or runny nose.  CARDIOVASCULAR:  No pressure, squeezing, strangling, tightness, heaviness or aching about the chest, neck, axilla or epigastrium.  RESPIRATORY:  No cough, shortness of breath  GASTROINTESTINAL:  No nausea, vomiting or diarrhea.  GENITOURINARY:  No dysuria, frequency or urgency. No Blood in urine  MUSCULOSKELETAL:  no joint aches, no muscle pain  SKIN:  No change in skin, hair or nails.  NEUROLOGIC:  No Headaches, seizures or weakness.  PSYCHIATRIC:  No disorder of thought or mood.  ENDOCRINE:  No heat or cold intolerance  HEMATOLOGICAL:  No easy bruising or bleeding.    =======================================================  Allergies    lip swelling with lobster (Swelling)  erythromycin (Vomiting)  shellfish (Swelling; Hives)    Intolerances    Antibiotics:    Other medications:  atorvastatin 20 milliGRAM(s) Oral at bedtime  chlorhexidine 2% Cloths 1 Application(s) Topical daily  dextrose 5%. 1000 milliLiter(s) IV Continuous <Continuous>  dextrose 5%. 1000 milliLiter(s) IV Continuous <Continuous>  dextrose 50% Injectable 25 Gram(s) IV Push once  dextrose 50% Injectable 12.5 Gram(s) IV Push once  dextrose 50% Injectable 25 Gram(s) IV Push once  epoetin dereck-epbx (RETACRIT) Injectable 48678 Unit(s) IV Push <User Schedule>  folic acid 1 milliGRAM(s) Oral daily  gabapentin 300 milliGRAM(s) Oral three times a day  glucagon  Injectable 1 milliGRAM(s) IntraMuscular once  hydrALAZINE 50 milliGRAM(s) Oral three times a day  insulin lispro (ADMELOG) corrective regimen sliding scale   SubCutaneous three times a day before meals  insulin lispro (ADMELOG) corrective regimen sliding scale   SubCutaneous at bedtime  labetalol 200 milliGRAM(s) Oral two times a day  montelukast 10 milliGRAM(s) Oral daily  senna 2 Tablet(s) Oral at bedtime    ======================================================  Physical Exam:  ============  Vital Signs Last 24 Hrs  T(C): 36.8 (03 Mar 2025 17:33), Max: 36.8 (03 Mar 2025 17:33)  T(F): 98.3 (03 Mar 2025 17:33), Max: 98.3 (03 Mar 2025 17:33)  HR: 78 (03 Mar 2025 17:33) (65 - 80)  BP: 148/55 (03 Mar 2025 17:33) (102/59 - 162/60)  BP(mean): --  RR: 17 (03 Mar 2025 17:33) (14 - 18)  SpO2: 100% (03 Mar 2025 17:33) (96% - 100%)    Parameters below as of 03 Mar 2025 17:33  Patient On (Oxygen Delivery Method): room air              General:  No acute distress.  Eye: no conjunctival pallor, no scleral icterus  Respiratory: Lungs are clear to auscultation, Respirations are non-labored.  Cardiovascular: Normal rate, Regular rhythm,  s1+s2  Gastrointestinal: Soft, Non-tender, Non-distended, Normal bowel sounds.  Genitourinary: No costovertebral angle tenderness.  msk: right le scars  Integumentary: No rash. lue avf b/l le edema  Neurologic: no focal deficits  Psychiatric: Appropriate mood & affect.  =======================================================  Labs:                                                           10.0   6.56  )-----------( 353      ( 03 Mar 2025 05:50 )             34.1       03-03    132[L]  |  92[L]  |  52.4[H]  ----------------------------<  99  5.6[H]   |  25.0  |  7.33[H]    Ca    9.2      03 Mar 2025 05:50                Urinalysis Basic - ( 03 Mar 2025 05:50 )    Color: x / Appearance: x / SG: x / pH: x  Gluc: 99 mg/dL / Ketone: x  / Bili: x / Urobili: x   Blood: x / Protein: x / Nitrite: x   Leuk Esterase: x / RBC: x / WBC x   Sq Epi: x / Non Sq Epi: x / Bacteria: x                  CAPILLARY BLOOD GLUCOSE      POCT Blood Glucose.: 73 mg/dL (03 Mar 2025 17:00)                  Urinalysis Basic - ( 28 Feb 2025 06:38 )    Color: x / Appearance: x / SG: x / pH: x  Gluc: 92 mg/dL / Ketone: x  / Bili: x / Urobili: x   Blood: x / Protein: x / Nitrite: x   Leuk Esterase: x / RBC: x / WBC x   Sq Epi: x / Non Sq Epi: x / Bacteria: x        PT/INR - ( 27 Feb 2025 11:00 )   PT: 10.8 sec;   INR: 0.93 ratio         PTT - ( 27 Feb 2025 11:00 )  PTT:31.4 sec          CAPILLARY BLOOD GLUCOSE  131 (27 Feb 2025 13:22)      POCT Blood Glucose.: 86 mg/dL (28 Feb 2025 16:46)                Urinalysis Basic - ( 26 Feb 2025 06:55 )    Color: x / Appearance: x / SG: x / pH: x  Gluc: 98 mg/dL / Ketone: x  / Bili: x / Urobili: x   Blood: x / Protein: x / Nitrite: x   Leuk Esterase: x / RBC: x / WBC x   Sq Epi: x / Non Sq Epi: x / Bacteria: x                  CAPILLARY BLOOD GLUCOSE      POCT Blood Glucose.: 187 mg/dL (26 Feb 2025 11:25)              Culture - Blood (collected 02-19-25 @ 09:08)  Source: .Blood Blood  Final Report (02-24-25 @ 13:00):    No growth at 5 days    Culture - Blood (collected 02-19-25 @ 09:08)  Source: .Blood Blood  Final Report (02-24-25 @ 13:00):    No growth at 5 days    Culture - Blood (collected 02-16-25 @ 00:20)  Source: .Blood BLOOD  Final Report (02-21-25 @ 07:00):    No growth at 5 days

## 2025-03-03 NOTE — PROGRESS NOTE ADULT - SUBJECTIVE AND OBJECTIVE BOX
Sherrie Tran M.D.    Patient is a 69y old  Female who presents with a chief complaint of Discitis (02 Mar 2025 14:04)      SUBJECTIVE / OVERNIGHT EVENTS: no event overnight.     Patient denies chest pain, SOB, abd pain, N/V, fever, chills, dysuria or any other complaints. All remainder ROS negative.     MEDICATIONS  (STANDING):  acetaminophen     Tablet .. 975 milliGRAM(s) Oral every 8 hours  aspirin  chewable 81 milliGRAM(s) Oral daily  atorvastatin 40 milliGRAM(s) Oral at bedtime  cefepime  Injectable. 2000 milliGRAM(s) IV Push <User Schedule>  chlorhexidine 2% Cloths 1 Application(s) Topical daily  clopidogrel Tablet 75 milliGRAM(s) Oral daily  dextrose 5%. 1000 milliLiter(s) (100 mL/Hr) IV Continuous <Continuous>  dextrose 5%. 1000 milliLiter(s) (50 mL/Hr) IV Continuous <Continuous>  dextrose 50% Injectable 25 Gram(s) IV Push once  dextrose 50% Injectable 12.5 Gram(s) IV Push once  dextrose 50% Injectable 25 Gram(s) IV Push once  epoetin dereck-epbx (RETACRIT) Injectable 92167 Unit(s) IV Push <User Schedule>  folic acid 1 milliGRAM(s) Oral daily  glucagon  Injectable 1 milliGRAM(s) IntraMuscular once  heparin   Injectable 5000 Unit(s) SubCutaneous every 12 hours  hydrALAZINE 50 milliGRAM(s) Oral three times a day  insulin lispro (ADMELOG) corrective regimen sliding scale   SubCutaneous three times a day before meals  insulin lispro (ADMELOG) corrective regimen sliding scale   SubCutaneous at bedtime  labetalol 200 milliGRAM(s) Oral two times a day  montelukast 10 milliGRAM(s) Oral daily  senna 2 Tablet(s) Oral at bedtime  vancomycin  IVPB 750 milliGRAM(s) IV Intermittent <User Schedule>    MEDICATIONS  (PRN):  albuterol    90 MICROgram(s) HFA Inhaler 2 Puff(s) Inhalation every 6 hours PRN for shortness of breath and/or wheezing  aluminum hydroxide/magnesium hydroxide/simethicone Suspension 30 milliLiter(s) Oral every 4 hours PRN Dyspepsia  dextrose Oral Gel 15 Gram(s) Oral once PRN Blood Glucose LESS THAN 70 milliGRAM(s)/deciliter  HYDROmorphone   Tablet 2 milliGRAM(s) Oral every 6 hours PRN Moderate Pain (4 - 6)  HYDROmorphone   Tablet 4 milliGRAM(s) Oral every 6 hours PRN Severe Pain (7 - 10)  HYDROmorphone  Injectable 0.5 milliGRAM(s) IV Push every 6 hours PRN breakthrough pain  melatonin 3 milliGRAM(s) Oral at bedtime PRN Insomnia  naloxone Injectable 1 milliGRAM(s) IV Push once PRN For Opioid OD  ondansetron Injectable 4 milliGRAM(s) IV Push every 8 hours PRN Nausea and/or Vomiting      I&O's Summary    02 Mar 2025 07:01  -  03 Mar 2025 07:00  --------------------------------------------------------  IN: 60 mL / OUT: 0 mL / NET: 60 mL        PHYSICAL EXAM:  Vital Signs Last 24 Hrs  T(C): 36.7 (03 Mar 2025 09:40), Max: 36.7 (02 Mar 2025 13:20)  T(F): 98 (03 Mar 2025 09:40), Max: 98 (02 Mar 2025 13:20)  HR: 69 (03 Mar 2025 09:40) (65 - 75)  BP: 104/51 (03 Mar 2025 09:40) (101/70 - 162/60)  BP(mean): --  RR: 17 (03 Mar 2025 09:40) (13 - 18)  SpO2: 96% (03 Mar 2025 09:40) (96% - 100%)    Parameters below as of 03 Mar 2025 09:40  Patient On (Oxygen Delivery Method): room air    CONSTITUTIONAL: NAD, well-groomed  RESPIRATORY: Normal respiratory effort; lungs are clear to auscultation bilaterally  CARDIOVASCULAR: Regular rate and rhythm, no LE edema  ABDOMEN: Nontender to palpation, normoactive bowel sounds  NEURO: Word finding difficulty. 2-3/5 of RLE (chronic per pt). No facial droop noted    LABS:                        10.0   6.56  )-----------( 353      ( 03 Mar 2025 05:50 )             34.1     03-03    132[L]  |  92[L]  |  52.4[H]  ----------------------------<  99  5.6[H]   |  25.0  |  7.33[H]    Ca    9.2      03 Mar 2025 05:50        Urinalysis Basic - ( 03 Mar 2025 05:50 )    Color: x / Appearance: x / SG: x / pH: x  Gluc: 99 mg/dL / Ketone: x  / Bili: x / Urobili: x   Blood: x / Protein: x / Nitrite: x   Leuk Esterase: x / RBC: x / WBC x   Sq Epi: x / Non Sq Epi: x / Bacteria: x      CAPILLARY BLOOD GLUCOSE      POCT Blood Glucose.: 98 mg/dL (03 Mar 2025 11:59)  POCT Blood Glucose.: 99 mg/dL (03 Mar 2025 07:39)  POCT Blood Glucose.: 131 mg/dL (02 Mar 2025 21:02)  POCT Blood Glucose.: 159 mg/dL (02 Mar 2025 17:17)      RADIOLOGY & ADDITIONAL TESTS:  Results Reviewed:   Imaging Personally Reviewed:  Electrocardiogram Personally Reviewed:

## 2025-03-03 NOTE — PROGRESS NOTE ADULT - SUBJECTIVE AND OBJECTIVE BOX
[facility] DIVISION OF KIDNEY DISEASES AND HYPERTENSION -- HEMODIALYSIS NOTE  --------------------------------------------------------------------------------  Chief Complaint: ESRD/Ongoing hemodialysis requirement    24 hour events/subjective:        PAST HISTORY  --------------------------------------------------------------------------------  No significant changes to PMH, PSH, FHx, SHx, unless otherwise noted    ALLERGIES & MEDICATIONS  --------------------------------------------------------------------------------  Allergies    lip swelling with lobster (Swelling)  erythromycin (Vomiting)  shellfish (Swelling; Hives)      Standing Inpatient Medications  acetaminophen     Tablet .. 975 milliGRAM(s) Oral every 8 hours  aspirin  chewable 81 milliGRAM(s) Oral daily  atorvastatin 40 milliGRAM(s) Oral at bedtime  cefepime  Injectable. 2000 milliGRAM(s) IV Push <User Schedule>  chlorhexidine 2% Cloths 1 Application(s) Topical daily  clopidogrel Tablet 75 milliGRAM(s) Oral daily  dextrose 5%. 1000 milliLiter(s) IV Continuous <Continuous>  dextrose 5%. 1000 milliLiter(s) IV Continuous <Continuous>  dextrose 50% Injectable 25 Gram(s) IV Push once  dextrose 50% Injectable 12.5 Gram(s) IV Push once  dextrose 50% Injectable 25 Gram(s) IV Push once  epoetin dereck-epbx (RETACRIT) Injectable 78773 Unit(s) IV Push <User Schedule>  folic acid 1 milliGRAM(s) Oral daily  glucagon  Injectable 1 milliGRAM(s) IntraMuscular once  heparin   Injectable 5000 Unit(s) SubCutaneous every 12 hours  hydrALAZINE 50 milliGRAM(s) Oral three times a day  insulin lispro (ADMELOG) corrective regimen sliding scale   SubCutaneous three times a day before meals  insulin lispro (ADMELOG) corrective regimen sliding scale   SubCutaneous at bedtime  labetalol 200 milliGRAM(s) Oral two times a day  montelukast 10 milliGRAM(s) Oral daily  senna 2 Tablet(s) Oral at bedtime  vancomycin  IVPB 1250 milliGRAM(s) IV Intermittent <User Schedule>    PRN Inpatient Medications  albuterol    90 MICROgram(s) HFA Inhaler 2 Puff(s) Inhalation every 6 hours PRN  aluminum hydroxide/magnesium hydroxide/simethicone Suspension 30 milliLiter(s) Oral every 4 hours PRN  dextrose Oral Gel 15 Gram(s) Oral once PRN  HYDROmorphone   Tablet 2 milliGRAM(s) Oral every 6 hours PRN  HYDROmorphone   Tablet 4 milliGRAM(s) Oral every 6 hours PRN  HYDROmorphone  Injectable 0.5 milliGRAM(s) IV Push every 6 hours PRN  melatonin 3 milliGRAM(s) Oral at bedtime PRN  naloxone Injectable 1 milliGRAM(s) IV Push once PRN  ondansetron Injectable 4 milliGRAM(s) IV Push every 8 hours PRN      REVIEW OF SYSTEMS  --------------------------------------------------------------------------------  Gen: No weight changes, fatigue, fevers/chills, weakness  Skin: No rashes  Head/Eyes/Ears/Mouth: No headache  Respiratory: No dyspnea, cough,  CV: No chest pain, orthopnea  GI: No abdominal pain, diarrhea, constipation, nausea, vomiting,  MSK: No joint pain  Neuro: No dizziness/lightheadedness, weakness  Heme: No bleeding  Psych: No significant nervousness, anxiety, stress, depression    All other systems were reviewed and are negative, except as noted.    VITALS/PHYSICAL EXAM  --------------------------------------------------------------------------------  T(C): 36.6 (03-03-25 @ 07:40), Max: 36.7 (03-02-25 @ 11:11)  HR: 65 (03-03-25 @ 07:40) (65 - 75)  BP: 145/68 (03-03-25 @ 07:40) (101/70 - 162/60)  RR: 14 (03-03-25 @ 07:40) (13 - 18)  SpO2: 100% (03-03-25 @ 07:40) (97% - 100%)  Wt(kg): --        03-02-25 @ 07:01  -  03-03-25 @ 07:00  --------------------------------------------------------  IN: 60 mL / OUT: 0 mL / NET: 60 mL      Physical Exam:  	Gen: NAD, well-appearing  	HEENT: PERRL, supple neck,  	Pulm: CTA B/L  	CV: RRR, S1S2; no rub  	Abd: +BS, soft, nontender  	UE: Warm, intact strength; no asterixis  	LE: Warm, + edema  	Neuro: No focal deficits  	Psych: Normal affect and mood  	Skin: Warm, without rashes  	Vascular access:    LABS/STUDIES  --------------------------------------------------------------------------------              10.0   6.56  >-----------<  353      [03-03-25 @ 05:50]              34.1     132  |  92  |  52.4  ----------------------------<  99      [03-03-25 @ 05:50]  5.6   |  25.0  |  7.33        Ca     9.2     [03-03-25 @ 05:50]            Lipid: chol 127, , HDL 48, LDL --      [02-28-25 @ 06:38]    HBsAb 66.0      [02-19-25 @ 15:20]  HBsAg Nonreact      [02-19-25 @ 15:20]  HCV 0.39, Nonreact      [02-19-25 @ 15:20]   [facility] DIVISION OF KIDNEY DISEASES AND HYPERTENSION -- HEMODIALYSIS NOTE  --------------------------------------------------------------------------------  Chief Complaint: ESRD/Ongoing hemodialysis requirement    24 hour events/subjective:  pt seen and examined; feels well      PAST HISTORY  --------------------------------------------------------------------------------  No significant changes to PMH, PSH, FHx, SHx, unless otherwise noted    ALLERGIES & MEDICATIONS  --------------------------------------------------------------------------------  Allergies    lip swelling with lobster (Swelling)  erythromycin (Vomiting)  shellfish (Swelling; Hives)      Standing Inpatient Medications  acetaminophen     Tablet .. 975 milliGRAM(s) Oral every 8 hours  aspirin  chewable 81 milliGRAM(s) Oral daily  atorvastatin 40 milliGRAM(s) Oral at bedtime  cefepime  Injectable. 2000 milliGRAM(s) IV Push <User Schedule>  chlorhexidine 2% Cloths 1 Application(s) Topical daily  clopidogrel Tablet 75 milliGRAM(s) Oral daily  dextrose 5%. 1000 milliLiter(s) IV Continuous <Continuous>  dextrose 5%. 1000 milliLiter(s) IV Continuous <Continuous>  dextrose 50% Injectable 25 Gram(s) IV Push once  dextrose 50% Injectable 12.5 Gram(s) IV Push once  dextrose 50% Injectable 25 Gram(s) IV Push once  epoetin dereck-epbx (RETACRIT) Injectable 69739 Unit(s) IV Push <User Schedule>  folic acid 1 milliGRAM(s) Oral daily  glucagon  Injectable 1 milliGRAM(s) IntraMuscular once  heparin   Injectable 5000 Unit(s) SubCutaneous every 12 hours  hydrALAZINE 50 milliGRAM(s) Oral three times a day  insulin lispro (ADMELOG) corrective regimen sliding scale   SubCutaneous three times a day before meals  insulin lispro (ADMELOG) corrective regimen sliding scale   SubCutaneous at bedtime  labetalol 200 milliGRAM(s) Oral two times a day  montelukast 10 milliGRAM(s) Oral daily  senna 2 Tablet(s) Oral at bedtime  vancomycin  IVPB 1250 milliGRAM(s) IV Intermittent <User Schedule>    PRN Inpatient Medications  albuterol    90 MICROgram(s) HFA Inhaler 2 Puff(s) Inhalation every 6 hours PRN  aluminum hydroxide/magnesium hydroxide/simethicone Suspension 30 milliLiter(s) Oral every 4 hours PRN  dextrose Oral Gel 15 Gram(s) Oral once PRN  HYDROmorphone   Tablet 2 milliGRAM(s) Oral every 6 hours PRN  HYDROmorphone   Tablet 4 milliGRAM(s) Oral every 6 hours PRN  HYDROmorphone  Injectable 0.5 milliGRAM(s) IV Push every 6 hours PRN  melatonin 3 milliGRAM(s) Oral at bedtime PRN  naloxone Injectable 1 milliGRAM(s) IV Push once PRN  ondansetron Injectable 4 milliGRAM(s) IV Push every 8 hours PRN      REVIEW OF SYSTEMS  --------------------------------------------------------------------------------  Gen: No weight changes, fatigue, fevers/chills, weakness  Skin: No rashes  Head/Eyes/Ears/Mouth: No headache  Respiratory: No dyspnea, cough,  CV: No chest pain, orthopnea  GI: No abdominal pain, diarrhea, constipation, nausea, vomiting,  MSK: No joint pain  Neuro: No dizziness/lightheadedness, weakness  Heme: No bleeding  Psych: No significant nervousness, anxiety, stress, depression    All other systems were reviewed and are negative, except as noted.    VITALS/PHYSICAL EXAM  --------------------------------------------------------------------------------  T(C): 36.6 (03-03-25 @ 07:40), Max: 36.7 (03-02-25 @ 11:11)  HR: 65 (03-03-25 @ 07:40) (65 - 75)  BP: 145/68 (03-03-25 @ 07:40) (101/70 - 162/60)  RR: 14 (03-03-25 @ 07:40) (13 - 18)  SpO2: 100% (03-03-25 @ 07:40) (97% - 100%)  Wt(kg): --        03-02-25 @ 07:01  -  03-03-25 @ 07:00  --------------------------------------------------------  IN: 60 mL / OUT: 0 mL / NET: 60 mL      Physical Exam:  	Gen: NAD, well-appearing  	HEENT: PERRL, supple neck,  	Pulm: CTA B/L  	CV: RRR, S1S2; no rub  	Abd: +BS, soft, nontender  	UE: Warm  	LE: Warm, no edema  	Neuro: No focal deficits  	Psych: Normal affect and mood  	Skin: Warm, without rashes  	Vascular access: AVG    LABS/STUDIES  --------------------------------------------------------------------------------              10.0   6.56  >-----------<  353      [03-03-25 @ 05:50]              34.1     132  |  92  |  52.4  ----------------------------<  99      [03-03-25 @ 05:50]  5.6   |  25.0  |  7.33        Ca     9.2     [03-03-25 @ 05:50]            Lipid: chol 127, , HDL 48, LDL --      [02-28-25 @ 06:38]    HBsAb 66.0      [02-19-25 @ 15:20]  HBsAg Nonreact      [02-19-25 @ 15:20]  HCV 0.39, Nonreact      [02-19-25 @ 15:20]

## 2025-03-03 NOTE — PROGRESS NOTE ADULT - ASSESSMENT
ESRD on HD  MWF schedule   Access is LUE AVG  HD today    Anemia of CKD  Hb at goal  HOWARD with HD    HTN/ volume  Bp stable- pt euvolemic  UF as tolerated with HD    CKD MBD  ca++ and phos at goal  Continue phos binders with meals    Monitor PTH levels     Discitis/osteomyelitis at L4-L5 with 5 mm anterolisthesis.   continue antibiotics as per ID

## 2025-03-03 NOTE — PROGRESS NOTE ADULT - ASSESSMENT
70 y/o female with PMH of HTN, HLD, ESRD on HD (M/W/F), DM-2 came to the ED complaining of low back pain.Found to have Discitis/OM of L4-5 on mri spine,ct lumbar spine.Per Ortho - Offered surgery but patient wants conservative management. asa/plavix held  for 5 days for biospy. .S/P Biopsy by IR on 02/25. start cefepime and vancomycin with HD, monitor trough Per ID   f/u biopsy ngtd. f/u id rec about abx duration. ABX WITH hd. Code stroke was called on 02/27 for rt facial droop, difficulty words finding.CT head no acute stroke. MRI brain, MRA brain/neck w/o contrast per neurology.     #Acute on chronic low back pain 2/2 Discitis/OM of L4-5  CT lumbar degenerative disc changes at L4-L5 concerning for discitis/OM   MRI with OM/Discitis of L4/5  S/P biopsy by IR on 02/25  cw cefepime and vancomycin with HD per ID, f/u ID recs for dispo regimen   Biopsy ngtd  Blood cx neg    Pain regimen  Eval by Ortho - Patient wants conservative management     #R/o CVA  Code stroke called on 02/27  pt unable to expresssome  words  stat CTH no acute stroke  Neurocheck q4hr   MRI brain, MRA brain/neck w/o contrast without acute CVA  ASA, plavix, statin increase to 40mg  neuro recs danny  carotid us noted calcified plaque within the proximal left ICA with a peak systolic velocity of 196 cm/s consistent with a 50-69% stenosis, outpt vascular followup  cleared by neurology for discharge   PT/OT eval pending      #Elevated troponin likely from CKD  EKG, TTE reviewed with   Continue DAPT   No chest pain  Tele    #Right knee pain   XR knee: lateral prosthetic patellar dislocation  Seen by ortho  Pain control  PT eval     #ESRD   On HD (M/W/F)   Nephrology following     #HTN/HLD/CAD   Aspirin /plavix 75mg    Lipitor 40mg QD  Labetalol 200mg bid   Hydralazine 50mg     #Hyperglycemia with DM-2   Patient not on medication   HbA1C: 5.5 (07/24/23)   Insulin sliding scale   A1C of 6.9    DVT prophylaxis: Heparin sc  Dispo: ID recs for discharge and PT eval     PT eval pending

## 2025-03-04 ENCOUNTER — TRANSCRIPTION ENCOUNTER (OUTPATIENT)
Age: 70
End: 2025-03-04

## 2025-03-04 VITALS
DIASTOLIC BLOOD PRESSURE: 67 MMHG | HEART RATE: 66 BPM | TEMPERATURE: 98 F | RESPIRATION RATE: 18 BRPM | SYSTOLIC BLOOD PRESSURE: 163 MMHG | OXYGEN SATURATION: 100 %

## 2025-03-04 DIAGNOSIS — R13.0 APHAGIA: ICD-10-CM

## 2025-03-04 LAB
ANION GAP SERPL CALC-SCNC: 16 MMOL/L — SIGNIFICANT CHANGE UP (ref 5–17)
BLD GP AB SCN SERPL QL: SIGNIFICANT CHANGE UP
BUN SERPL-MCNC: 34.8 MG/DL — HIGH (ref 8–20)
CALCIUM SERPL-MCNC: 9 MG/DL — SIGNIFICANT CHANGE UP (ref 8.4–10.5)
CHLORIDE SERPL-SCNC: 94 MMOL/L — LOW (ref 96–108)
CO2 SERPL-SCNC: 24 MMOL/L — SIGNIFICANT CHANGE UP (ref 22–29)
CREAT SERPL-MCNC: 5.32 MG/DL — HIGH (ref 0.5–1.3)
EGFR: 8 ML/MIN/1.73M2 — LOW
EGFR: 8 ML/MIN/1.73M2 — LOW
GLUCOSE BLDC GLUCOMTR-MCNC: 110 MG/DL — HIGH (ref 70–99)
GLUCOSE BLDC GLUCOMTR-MCNC: 72 MG/DL — SIGNIFICANT CHANGE UP (ref 70–99)
GLUCOSE SERPL-MCNC: 88 MG/DL — SIGNIFICANT CHANGE UP (ref 70–99)
HCT VFR BLD CALC: 30.3 % — LOW (ref 34.5–45)
HCT VFR BLD CALC: 30.5 % — LOW (ref 34.5–45)
HGB BLD-MCNC: 9.2 G/DL — LOW (ref 11.5–15.5)
HGB BLD-MCNC: 9.4 G/DL — LOW (ref 11.5–15.5)
MCHC RBC-ENTMCNC: 28.2 PG — SIGNIFICANT CHANGE UP (ref 27–34)
MCHC RBC-ENTMCNC: 28.2 PG — SIGNIFICANT CHANGE UP (ref 27–34)
MCHC RBC-ENTMCNC: 30.4 G/DL — LOW (ref 32–36)
MCHC RBC-ENTMCNC: 30.8 G/DL — LOW (ref 32–36)
MCV RBC AUTO: 91.6 FL — SIGNIFICANT CHANGE UP (ref 80–100)
MCV RBC AUTO: 92.9 FL — SIGNIFICANT CHANGE UP (ref 80–100)
NRBC # BLD AUTO: 0 K/UL — SIGNIFICANT CHANGE UP (ref 0–0)
NRBC # BLD AUTO: 0 K/UL — SIGNIFICANT CHANGE UP (ref 0–0)
NRBC # FLD: 0 K/UL — SIGNIFICANT CHANGE UP (ref 0–0)
NRBC # FLD: 0 K/UL — SIGNIFICANT CHANGE UP (ref 0–0)
NRBC BLD AUTO-RTO: 0 /100 WBCS — SIGNIFICANT CHANGE UP (ref 0–0)
NRBC BLD AUTO-RTO: 0 /100 WBCS — SIGNIFICANT CHANGE UP (ref 0–0)
PLATELET # BLD AUTO: 326 K/UL — SIGNIFICANT CHANGE UP (ref 150–400)
PLATELET # BLD AUTO: 331 K/UL — SIGNIFICANT CHANGE UP (ref 150–400)
PMV BLD: 10.2 FL — SIGNIFICANT CHANGE UP (ref 7–13)
PMV BLD: 9.9 FL — SIGNIFICANT CHANGE UP (ref 7–13)
POTASSIUM SERPL-MCNC: 4.2 MMOL/L — SIGNIFICANT CHANGE UP (ref 3.5–5.3)
POTASSIUM SERPL-SCNC: 4.2 MMOL/L — SIGNIFICANT CHANGE UP (ref 3.5–5.3)
RBC # BLD: 3.26 M/UL — LOW (ref 3.8–5.2)
RBC # BLD: 3.33 M/UL — LOW (ref 3.8–5.2)
RBC # FLD: 17.8 % — HIGH (ref 10.3–14.5)
RBC # FLD: 17.9 % — HIGH (ref 10.3–14.5)
SODIUM SERPL-SCNC: 134 MMOL/L — LOW (ref 135–145)
WBC # BLD: 5.12 K/UL — SIGNIFICANT CHANGE UP (ref 3.8–10.5)
WBC # BLD: 5.35 K/UL — SIGNIFICANT CHANGE UP (ref 3.8–10.5)
WBC # FLD AUTO: 5.12 K/UL — SIGNIFICANT CHANGE UP (ref 3.8–10.5)
WBC # FLD AUTO: 5.35 K/UL — SIGNIFICANT CHANGE UP (ref 3.8–10.5)

## 2025-03-04 PROCEDURE — 70547 MR ANGIOGRAPHY NECK W/O DYE: CPT | Mod: MC

## 2025-03-04 PROCEDURE — 85027 COMPLETE CBC AUTOMATED: CPT

## 2025-03-04 PROCEDURE — 86901 BLOOD TYPING SEROLOGIC RH(D): CPT

## 2025-03-04 PROCEDURE — 86706 HEP B SURFACE ANTIBODY: CPT

## 2025-03-04 PROCEDURE — 80202 ASSAY OF VANCOMYCIN: CPT

## 2025-03-04 PROCEDURE — 87340 HEPATITIS B SURFACE AG IA: CPT

## 2025-03-04 PROCEDURE — 85730 THROMBOPLASTIN TIME PARTIAL: CPT

## 2025-03-04 PROCEDURE — 85652 RBC SED RATE AUTOMATED: CPT

## 2025-03-04 PROCEDURE — 97116 GAIT TRAINING THERAPY: CPT

## 2025-03-04 PROCEDURE — 93306 TTE W/DOPPLER COMPLETE: CPT

## 2025-03-04 PROCEDURE — 97110 THERAPEUTIC EXERCISES: CPT

## 2025-03-04 PROCEDURE — 87040 BLOOD CULTURE FOR BACTERIA: CPT

## 2025-03-04 PROCEDURE — 84484 ASSAY OF TROPONIN QUANT: CPT

## 2025-03-04 PROCEDURE — 97167 OT EVAL HIGH COMPLEX 60 MIN: CPT

## 2025-03-04 PROCEDURE — 87102 FUNGUS ISOLATION CULTURE: CPT

## 2025-03-04 PROCEDURE — P9047: CPT

## 2025-03-04 PROCEDURE — 87641 MR-STAPH DNA AMP PROBE: CPT

## 2025-03-04 PROCEDURE — 87205 SMEAR GRAM STAIN: CPT

## 2025-03-04 PROCEDURE — 87116 MYCOBACTERIA CULTURE: CPT

## 2025-03-04 PROCEDURE — 99285 EMERGENCY DEPT VISIT HI MDM: CPT

## 2025-03-04 PROCEDURE — 70450 CT HEAD/BRAIN W/O DYE: CPT | Mod: MC

## 2025-03-04 PROCEDURE — 86850 RBC ANTIBODY SCREEN: CPT

## 2025-03-04 PROCEDURE — 99261: CPT

## 2025-03-04 PROCEDURE — 72131 CT LUMBAR SPINE W/O DYE: CPT | Mod: MC

## 2025-03-04 PROCEDURE — 70544 MR ANGIOGRAPHY HEAD W/O DYE: CPT | Mod: MC

## 2025-03-04 PROCEDURE — 85610 PROTHROMBIN TIME: CPT

## 2025-03-04 PROCEDURE — 36415 COLL VENOUS BLD VENIPUNCTURE: CPT

## 2025-03-04 PROCEDURE — 71045 X-RAY EXAM CHEST 1 VIEW: CPT

## 2025-03-04 PROCEDURE — 70551 MRI BRAIN STEM W/O DYE: CPT | Mod: MC

## 2025-03-04 PROCEDURE — 93005 ELECTROCARDIOGRAM TRACING: CPT

## 2025-03-04 PROCEDURE — 87015 SPECIMEN INFECT AGNT CONCNTJ: CPT

## 2025-03-04 PROCEDURE — 85025 COMPLETE CBC W/AUTO DIFF WBC: CPT

## 2025-03-04 PROCEDURE — 80074 ACUTE HEPATITIS PANEL: CPT

## 2025-03-04 PROCEDURE — 82962 GLUCOSE BLOOD TEST: CPT

## 2025-03-04 PROCEDURE — 73562 X-RAY EXAM OF KNEE 3: CPT

## 2025-03-04 PROCEDURE — 83036 HEMOGLOBIN GLYCOSYLATED A1C: CPT

## 2025-03-04 PROCEDURE — 97530 THERAPEUTIC ACTIVITIES: CPT

## 2025-03-04 PROCEDURE — 99239 HOSP IP/OBS DSCHRG MGMT >30: CPT

## 2025-03-04 PROCEDURE — 93970 EXTREMITY STUDY: CPT

## 2025-03-04 PROCEDURE — 84100 ASSAY OF PHOSPHORUS: CPT

## 2025-03-04 PROCEDURE — 86140 C-REACTIVE PROTEIN: CPT

## 2025-03-04 PROCEDURE — 86900 BLOOD TYPING SEROLOGIC ABO: CPT

## 2025-03-04 PROCEDURE — 93880 EXTRACRANIAL BILAT STUDY: CPT

## 2025-03-04 PROCEDURE — 80061 LIPID PANEL: CPT

## 2025-03-04 PROCEDURE — 72158 MRI LUMBAR SPINE W/O & W/DYE: CPT | Mod: MC

## 2025-03-04 PROCEDURE — 87075 CULTR BACTERIA EXCEPT BLOOD: CPT

## 2025-03-04 PROCEDURE — 72192 CT PELVIS W/O DYE: CPT | Mod: MC

## 2025-03-04 PROCEDURE — 87070 CULTURE OTHR SPECIMN AEROBIC: CPT

## 2025-03-04 PROCEDURE — 80048 BASIC METABOLIC PNL TOTAL CA: CPT

## 2025-03-04 PROCEDURE — 82550 ASSAY OF CK (CPK): CPT

## 2025-03-04 PROCEDURE — 83735 ASSAY OF MAGNESIUM: CPT

## 2025-03-04 PROCEDURE — 87206 SMEAR FLUORESCENT/ACID STAI: CPT

## 2025-03-04 PROCEDURE — 77012 CT SCAN FOR NEEDLE BIOPSY: CPT

## 2025-03-04 PROCEDURE — 87640 STAPH A DNA AMP PROBE: CPT

## 2025-03-04 PROCEDURE — 87637 SARSCOV2&INF A&B&RSV AMP PRB: CPT

## 2025-03-04 PROCEDURE — 80053 COMPREHEN METABOLIC PANEL: CPT

## 2025-03-04 PROCEDURE — 86803 HEPATITIS C AB TEST: CPT

## 2025-03-04 PROCEDURE — 99232 SBSQ HOSP IP/OBS MODERATE 35: CPT

## 2025-03-04 RX ORDER — ATORVASTATIN CALCIUM 80 MG/1
1 TABLET, FILM COATED ORAL
Qty: 0 | Refills: 0 | DISCHARGE
Start: 2025-03-04

## 2025-03-04 RX ORDER — VANCOMYCIN HCL IN 5 % DEXTROSE 1.5G/250ML
750 PLASTIC BAG, INJECTION (ML) INTRAVENOUS
Qty: 0 | Refills: 0 | DISCHARGE
Start: 2025-03-04

## 2025-03-04 RX ORDER — INSULIN LISPRO 100 U/ML
0 INJECTION, SOLUTION INTRAVENOUS; SUBCUTANEOUS
Qty: 0 | Refills: 0 | DISCHARGE
Start: 2025-03-04

## 2025-03-04 RX ORDER — ALBUMIN (HUMAN) 12.5 G/50ML
100 INJECTION, SOLUTION INTRAVENOUS ONCE
Refills: 0 | Status: COMPLETED | OUTPATIENT
Start: 2025-03-04 | End: 2025-03-04

## 2025-03-04 RX ORDER — CEFEPIME 2 G/20ML
2 INJECTION, POWDER, FOR SOLUTION INTRAVENOUS
Qty: 0 | Refills: 0 | DISCHARGE
Start: 2025-03-04

## 2025-03-04 RX ORDER — HYDROMORPHONE/SOD CHLOR,ISO/PF 2 MG/10 ML
1 SYRINGE (ML) INJECTION
Qty: 0 | Refills: 0 | DISCHARGE
Start: 2025-03-04

## 2025-03-04 RX ADMIN — CLOPIDOGREL BISULFATE 75 MILLIGRAM(S): 75 TABLET, FILM COATED ORAL at 10:50

## 2025-03-04 RX ADMIN — Medication 4 MILLIGRAM(S): at 16:14

## 2025-03-04 RX ADMIN — Medication 4 MILLIGRAM(S): at 07:58

## 2025-03-04 RX ADMIN — HEPARIN SODIUM 5000 UNIT(S): 1000 INJECTION INTRAVENOUS; SUBCUTANEOUS at 10:49

## 2025-03-04 RX ADMIN — LABETALOL HYDROCHLORIDE 200 MILLIGRAM(S): 200 TABLET, FILM COATED ORAL at 07:56

## 2025-03-04 RX ADMIN — Medication 975 MILLIGRAM(S): at 05:12

## 2025-03-04 RX ADMIN — Medication 81 MILLIGRAM(S): at 10:50

## 2025-03-04 RX ADMIN — Medication 975 MILLIGRAM(S): at 07:20

## 2025-03-04 RX ADMIN — Medication 975 MILLIGRAM(S): at 14:48

## 2025-03-04 RX ADMIN — ALBUMIN (HUMAN) 50 MILLILITER(S): 12.5 INJECTION, SOLUTION INTRAVENOUS at 00:34

## 2025-03-04 RX ADMIN — FOLIC ACID 1 MILLIGRAM(S): 1 TABLET ORAL at 10:50

## 2025-03-04 RX ADMIN — Medication 1 APPLICATION(S): at 10:50

## 2025-03-04 NOTE — PROGRESS NOTE ADULT - SUBJECTIVE AND OBJECTIVE BOX
[facility] DIVISION OF KIDNEY DISEASES AND HYPERTENSION -- HEMODIALYSIS NOTE  --------------------------------------------------------------------------------  Chief Complaint: ESRD/Ongoing hemodialysis requirement    24 hour events/subjective:  Status post HD yesterday, tolerated well    Patient seen and examined, feels well.      PAST HISTORY  --------------------------------------------------------------------------------  No significant changes to PMH, PSH, FHx, SHx, unless otherwise noted    ALLERGIES & MEDICATIONS  --------------------------------------------------------------------------------  Allergies    lip swelling with lobster (Swelling)  erythromycin (Vomiting)  shellfish (Swelling; Hives)    Intolerances      Standing Inpatient Medications  acetaminophen     Tablet .. 975 milliGRAM(s) Oral every 8 hours  aspirin  chewable 81 milliGRAM(s) Oral daily  atorvastatin 40 milliGRAM(s) Oral at bedtime  cefepime  Injectable. 2000 milliGRAM(s) IV Push <User Schedule>  chlorhexidine 2% Cloths 1 Application(s) Topical daily  clopidogrel Tablet 75 milliGRAM(s) Oral daily  dextrose 5%. 1000 milliLiter(s) IV Continuous <Continuous>  dextrose 5%. 1000 milliLiter(s) IV Continuous <Continuous>  dextrose 50% Injectable 25 Gram(s) IV Push once  dextrose 50% Injectable 12.5 Gram(s) IV Push once  dextrose 50% Injectable 25 Gram(s) IV Push once  epoetin dereck-epbx (RETACRIT) Injectable 34420 Unit(s) IV Push <User Schedule>  folic acid 1 milliGRAM(s) Oral daily  glucagon  Injectable 1 milliGRAM(s) IntraMuscular once  heparin   Injectable 5000 Unit(s) SubCutaneous every 12 hours  insulin lispro (ADMELOG) corrective regimen sliding scale   SubCutaneous three times a day before meals  insulin lispro (ADMELOG) corrective regimen sliding scale   SubCutaneous at bedtime  labetalol 200 milliGRAM(s) Oral two times a day  montelukast 10 milliGRAM(s) Oral daily  senna 2 Tablet(s) Oral at bedtime  vancomycin  IVPB 750 milliGRAM(s) IV Intermittent <User Schedule>    PRN Inpatient Medications  albuterol    90 MICROgram(s) HFA Inhaler 2 Puff(s) Inhalation every 6 hours PRN  aluminum hydroxide/magnesium hydroxide/simethicone Suspension 30 milliLiter(s) Oral every 4 hours PRN  dextrose Oral Gel 15 Gram(s) Oral once PRN  HYDROmorphone   Tablet 2 milliGRAM(s) Oral every 6 hours PRN  HYDROmorphone   Tablet 4 milliGRAM(s) Oral every 6 hours PRN  HYDROmorphone  Injectable 0.5 milliGRAM(s) IV Push every 6 hours PRN  melatonin 3 milliGRAM(s) Oral at bedtime PRN  naloxone Injectable 1 milliGRAM(s) IV Push once PRN  ondansetron Injectable 4 milliGRAM(s) IV Push every 8 hours PRN      REVIEW OF SYSTEMS  --------------------------------------------------------------------------------  Gen: No weight changes, fatigue, fevers/chills, weakness  Skin: No rashes  Head/Eyes/Ears/Mouth: No headache  Respiratory: No dyspnea, cough,  CV: No chest pain, orthopnea  GI: No abdominal pain, diarrhea, constipation, nausea, vomiting,  MSK: No joint pain  Neuro: No dizziness/lightheadedness, weakness  Heme: No bleeding  Psych: No significant nervousness, anxiety, stress, depression    All other systems were reviewed and are negative, except as noted.    VITALS/PHYSICAL EXAM  --------------------------------------------------------------------------------  T(C): 36.5 (03-04-25 @ 12:35), Max: 37 (03-03-25 @ 23:59)  HR: 66 (03-04-25 @ 12:35) (66 - 78)  BP: 136/75 (03-04-25 @ 13:54) (84/40 - 157/76)  RR: 16 (03-04-25 @ 12:35) (16 - 18)  SpO2: 100% (03-04-25 @ 12:35) (100% - 100%)  Wt(kg): --        03-03-25 @ 07:01  -  03-04-25 @ 07:00  --------------------------------------------------------  IN: 1300 mL / OUT: 1300 mL / NET: 0 mL      Physical Exam:  	Gen: NAD, well-appearing  	HEENT: PERRL, supple neck,  	Pulm: CTA B/L  	CV: RRR, S1S2; no rub  	Abd: +BS, soft, nontender  	UE: Warm, intact strength; no asterixis  	LE: Warm, + edema  	Neuro: No focal deficits  	Psych: Normal affect and mood  	Skin: Warm, without rashes  	Vascular access: JOANA ARAGON    LABS/STUDIES  --------------------------------------------------------------------------------              9.2    5.35  >-----------<  331      [03-04-25 @ 05:47]              30.3     134  |  94  |  34.8  ----------------------------<  88      [03-04-25 @ 05:47]  4.2   |  24.0  |  5.32        Ca     9.0     [03-04-25 @ 05:47]            Lipid: chol 127, , HDL 48, LDL --      [02-28-25 @ 06:38]    HBsAb 66.0      [02-19-25 @ 15:20]  HBsAg Nonreact      [03-01-25 @ 05:12]  HCV 0.35, Nonreact      [03-01-25 @ 05:12]

## 2025-03-04 NOTE — PROGRESS NOTE ADULT - PROVIDER SPECIALTY LIST ADULT
Hospitalist
Infectious Disease
Nephrology
Nephrology
Orthopedics
Pain Medicine
Pain Medicine
Hospitalist
Infectious Disease
Internal Medicine
Nephrology
Orthopedics
Hospitalist
Nephrology
Nephrology
Pain Medicine
Infectious Disease
Infectious Disease

## 2025-03-04 NOTE — PROGRESS NOTE ADULT - SUBJECTIVE AND OBJECTIVE BOX
[id number]  EULOGIO NIELSEN    Patient is a 69y Female s/p kyphoplasty T5-6, POD#1. Patient seen and evaluated at bedside. Patient is doing well. Patient is ambulating with PT. Denies any acute motor or sensory changes. Denies any fever/chills, SOB, CP, N/V, numbness/tingling. No other orthopedic complaints.     T(C): 36.6 (03-04-25 @ 04:10), Max: 37 (03-03-25 @ 23:59)  HR: 72 (03-04-25 @ 04:10) (65 - 80)  BP: 129/59 (03-04-25 @ 04:10) (84/40 - 161/80)  RR: 18 (03-04-25 @ 04:10) (14 - 18)  SpO2: 100% (03-04-25 @ 04:10) (96% - 100%)                          9.2    5.35  )-----------( 331      ( 04 Mar 2025 05:47 )             30.3       Physical Exam:    General: Awake, alert, in NAD  Spine: Dressing C/D/I  Motor exam: [ x ]    Upper extremity                                Delt        Bic         Tric       WF      WE                                               R         5/5        5/5        5/5       5/5       5/5                                               L          5/5        5/5        5/5       5/5      5/5           Lower extremity                                HF         KE          TA       EHL         GS                                                 R        5/5        5/5        5/5       5/5         5/5                                               L         5/5        5/5       5/5       5/5          5/5    SILT C5-T1 B/L, L2-S1 intact B/L, DP pulses 2+ B/L  Calf soft, NT B/L    03-03-25 @ 07:01  -  03-04-25 @ 07:00  --------------------------------------------------------  IN: 1300 mL / OUT: 1300 mL / NET: 0 mL        A/P: Patient is a 69y Female s/p kyphoplasty T5-6, POD#1    * Pain control  * DVTP: SCDs onl    * PT  * WBAT  * Dispo: Home today when cleared by PT and medicine     [id number]  EULOGIO NIELSEN    Patient is a 69y Female with L4-5 discitis. Patient seen and evaluated at bedside. Patient is doing better today than yesterday. Endorsing pain to both legs with right worse than left. Pain is described as constant burning. Patient is ambulating with PT. Denies any acute motor or sensory changes. Denies any fever/chills, SOB, CP, N/V, numbness/tingling. No other orthopedic complaints.     T(C): 36.6 (03-04-25 @ 04:10), Max: 37 (03-03-25 @ 23:59)  HR: 72 (03-04-25 @ 04:10) (65 - 80)  BP: 129/59 (03-04-25 @ 04:10) (84/40 - 161/80)  RR: 18 (03-04-25 @ 04:10) (14 - 18)  SpO2: 100% (03-04-25 @ 04:10) (96% - 100%)                          9.2    5.35  )-----------( 331      ( 04 Mar 2025 05:47 )             30.3       Physical Exam:    General: Awake, alert, in NAD  Motor exam: [ x ]          Lower extremity                                   HF         KE          TA       EHL         GS                                                 R        5/5        5/5        5/5       5/5         5/5                                               L         5/5        5/5       5/5       5/5          5/5    SILT L2-S1 intact B/L, DP pulses 2+ B/L  Calf soft, NT B/L    03-03-25 @ 07:01  -  03-04-25 @ 07:00  --------------------------------------------------------  IN: 1300 mL / OUT: 1300 mL / NET: 0 mL        A/P: Patient is a 69y Female with L4-5 discitis    * No surgical intervention at this time  * IV ABX as per ID  * Pain control. Avoid NSAIDs x 6 weeks   * DVTP  * PT, OOB w/ assistance as tolerated  * WBAT

## 2025-03-04 NOTE — DISCHARGE NOTE NURSING/CASE MANAGEMENT/SOCIAL WORK - FINANCIAL ASSISTANCE
[facility] provides services at a reduced cost to those who are determined to be eligible through [facility]’s financial assistance program. Information regarding [facility]’s financial assistance program can be found by going to [email or web address] or by calling [phone].

## 2025-03-04 NOTE — PHARMACOTHERAPY INTERVENTION NOTE - COMMENTS
Vancomycin level ordered pre-HD
Vancomycin level ordered pre-HD
As per policy, ordered a vancomycin level for 3/3 AM in order to assist with vancomycin pharmacokinetic monitoring.    Don Green, PharmD, BCIDP  Clinical Pharmacy Specialist, Infectious Diseases  Tele-Antimicrobial Stewardship Program (Tele-ASP)  Available on Microsoft Teams  
Pre-HD vancomycin level 16.6, adjusted dose to 750 mg IV post HD.
Vancomycin level 8.9, redose today at 1250 mg post HD.

## 2025-03-04 NOTE — DISCHARGE NOTE NURSING/CASE MANAGEMENT/SOCIAL WORK - NSDCPEFALRISK_GEN_ALL_CORE
For information on Fall & Injury Prevention, visit: [email or web address] OR  [email or web address] OR  https://www.cdc.gov/steadi/patient.html

## 2025-03-04 NOTE — PROGRESS NOTE ADULT - ASSESSMENT
70 y/o female with PMH of HTN, HLD, ESRD on HD (M/W/F), DM-2 came to the ED complaining of low back pain.Found to have Discitis/OM of L4-5 on mri spine,ct lumbar spine.Per Ortho - Offered surgery but patient wants conservative management. asa/plavix held  for 5 days for biospy. .S/P Biopsy by IR on 02/25. start cefepime and vancomycin with HD, monitor trough Per ID   f/u biopsy ngtd. f/u id rec about abx duration. ABX WITH hd. Code stroke was called on 02/27 for rt facial droop, difficulty words finding.CT head no acute stroke. MRI brain, MRA brain/neck w/o contrast per neurology.     #Acute on chronic low back pain 2/2 Discitis/OM of L4-5  CT lumbar degenerative disc changes at L4-L5 concerning for discitis/OM   MRI with OM/Discitis of L4/5  S/P biopsy by IR on 02/25  cw cefepime and vancomycin with HD per ID until 4/9  weekly CBc CMP ESR CRP vanco trough (goal trough 15-20)  Biopsy ngtd  Blood cx neg    Pain regimen  Eval by Ortho - Patient wants conservative management     #R/o CVA  Code stroke called on 02/27  pt unable to expresssome  words  stat CTH no acute stroke  Neurocheck q4hr   MRI brain, MRA brain/neck w/o contrast without acute CVA  ASA, plavix, statin increase to 40mg  neuro recs danny  carotid us noted calcified plaque within the proximal left ICA with a peak systolic velocity of 196 cm/s consistent with a 50-69% stenosis, outpt vascular followup  cleared by neurology for discharge   PT/OT eval pending      #Elevated troponin likely from CKD  EKG, TTE reviewed with   Continue DAPT   No chest pain  Tele    #Right knee pain   XR knee: lateral prosthetic patellar dislocation  Seen by ortho  Pain control  PT eval     #ESRD   On HD (M/W/F)   Nephrology following     #HTN/HLD/CAD   Aspirin /plavix 75mg    Lipitor 40mg QD  Labetalol 200mg bid   Hydralazine 50mg     #Hyperglycemia with DM-2   Patient not on medication   HbA1C: 5.5 (07/24/23)   Insulin sliding scale   A1C of 6.9    DVT prophylaxis: Heparin sc  Dispo: [facility] with HD on site pending      68 y/o female with PMH of HTN, HLD, ESRD on HD (M/W/F), DM-2 came to the ED complaining of low back pain.Found to have Discitis/OM of L4-5 on mri spine,ct lumbar spine.Per Ortho - Offered surgery but patient wants conservative management. asa/plavix held  for 5 days for biospy. .S/P Biopsy by IR on 02/25. start cefepime and vancomycin with HD, monitor trough Per ID   f/u biopsy ngtd. f/u id rec about abx duration. ABX WITH hd. Code stroke was called on 02/27 for rt facial droop, difficulty words finding.CT head no acute stroke. MRI brain, MRA brain/neck w/o contrast per neurology.     #Acute on chronic low back pain 2/2 Discitis/OM of L4-5  CT lumbar degenerative disc changes at L4-L5 concerning for discitis/OM   MRI with OM/Discitis of L4/5  S/P biopsy by IR on 02/25  cw cefepime and vancomycin with HD per ID until 4/9  weekly CBc CMP ESR CRP vanco trough (goal trough 15-20)  Biopsy ngtd  Blood cx neg    Pain regimen  Eval by Ortho - Patient wants conservative management     #R/o CVA  Code stroke called on 02/27  pt unable to expresssome  words  stat CTH no acute stroke  Neurocheck q4hr   MRI brain, MRA brain/neck w/o contrast without acute CVA  ASA, plavix, statin increase to 40mg  neuro recs danny  carotid us noted calcified plaque within the proximal left ICA with a peak systolic velocity of 196 cm/s consistent with a 50-69% stenosis, outpt vascular followup  cleared by neurology for discharge   PT/OT eval pending      #Elevated troponin likely from CKD  EKG, TTE reviewed with   Continue DAPT   No chest pain  Tele    #Right knee pain   XR knee: lateral prosthetic patellar dislocation  Seen by ortho  Pain control  PT eval     #ESRD   On HD (M/W/F)   Nephrology following     #HTN/HLD/CAD   Aspirin /plavix 75mg    Lipitor 40mg QD  Labetalol 200mg bid   dc hydralazine as BP soft     #Hyperglycemia with DM-2   Patient not on medication   HbA1C: 5.5 (07/24/23)   Insulin sliding scale   A1C of 6.9    DVT prophylaxis: Heparin sc  Dispo: [facility] with HD on site pending

## 2025-03-04 NOTE — PROGRESS NOTE ADULT - ASSESSMENT
ESRD on HD  MWF schedule   Access is LUE AVG  HD tomorow    Anemia of CKD  Hb below goal  HOWARD with HD    HTN/ volume  Bp stable- pt euvolemic  UF as tolerated with HD    CKD MBD  ca++ and phos at goal  Continue phos binders with meals    Monitor PTH levels     Discitis/osteomyelitis at L4-L5 with 5 mm anterolisthesis.   continue antibiotics as per ID

## 2025-03-04 NOTE — PHARMACOTHERAPY INTERVENTION NOTE - NSPHARMCOMMASP
ASP - Lab/ test recommended
ASP - Dose optimization/Non-Renal dose adjustment
ASP - Dose optimization/Non-Renal dose adjustment

## 2025-03-04 NOTE — CHART NOTE - NSCHARTNOTEFT_GEN_A_CORE
Notified by RN pt BP 84/40 . Pt asymptomatic. Notified by RN pt BP 84/40 (MAP 55mmHg) . Pt asymptomatic.   70 y/o F PMH HTN, HLD, ESRD on HD (M/W/F), DM-2 admitted with acute on chronic back pain secondary to discitis/OM of L4-5 on vancomycin and cefepime. ID onboard.  Patient lying in bed awake, A&O X4. Pt c/o chronic pain to lower back. Pt denies CP, palpitations, SOB, lightheadedness, dizziness, abd pain or discomfort, N/V, hematuria, BRBPR, melena.  Patient s/p HD today, received labetalol 200mg @18:38, Hydralazine 50mg PO @ 22:15.     T 98.6 F oral, P 71, RR 18, O2 sat 100% on RA  GENERAL: awake, NAD  CHEST/LUNG: CTA bilaterally. No wheezing, rhonchi, rales. Respirations even and unlabored.  HEART: +S1/S2, Regular rate and rhythm  ABDOMEN: Soft, Nontender, Nondistended. Bowel sounds present x4 quads  EXTREMITIES:  2+ Peripheral Pulses, No edema.  NERVOUS SYSTEM:  Alert & Oriented X3    - STAT CBC, type and screen  - Trial Albumin IVPB X1 dose. Repeat BP s/p administration and PRN.   - Continue analgesics as ordered and PRN.  - Pt is stable at this time, NAD.  - RN instructed to continue to monitor pt and notify provider of any changes in pt status.

## 2025-03-04 NOTE — OCCUPATIONAL THERAPY INITIAL EVALUATION ADULT - PERTINENT HX OF CURRENT PROBLEM, REHAB EVAL
As per MD note: Patient is a 69y Female with L4-5 discitis. Patient seen and evaluated at bedside. Patient is doing better today than yesterday. Endorsing pain to both legs with right worse than left. Pain is described as constant burning. Patient is ambulating with PT. Denies any acute motor or sensory changes. Denies any fever/chills, SOB, CP, N/V, numbness/tingling. No other orthopedic complaints.

## 2025-03-04 NOTE — PROGRESS NOTE ADULT - SUBJECTIVE AND OBJECTIVE BOX
Sherrie Tran M.D.    Patient is a 69y old  Female who presents with a chief complaint of - (03 Mar 2025 09:13)      SUBJECTIVE / OVERNIGHT EVENTS: no event overnight.     Patient denies chest pain, SOB, abd pain, N/V, fever, chills, dysuria or any other complaints. All remainder ROS negative.     MEDICATIONS  (STANDING):  acetaminophen     Tablet .. 975 milliGRAM(s) Oral every 8 hours  aspirin  chewable 81 milliGRAM(s) Oral daily  atorvastatin 40 milliGRAM(s) Oral at bedtime  cefepime  Injectable. 2000 milliGRAM(s) IV Push <User Schedule>  chlorhexidine 2% Cloths 1 Application(s) Topical daily  clopidogrel Tablet 75 milliGRAM(s) Oral daily  dextrose 5%. 1000 milliLiter(s) (100 mL/Hr) IV Continuous <Continuous>  dextrose 5%. 1000 milliLiter(s) (50 mL/Hr) IV Continuous <Continuous>  dextrose 50% Injectable 25 Gram(s) IV Push once  dextrose 50% Injectable 12.5 Gram(s) IV Push once  dextrose 50% Injectable 25 Gram(s) IV Push once  epoetin dereck-epbx (RETACRIT) Injectable 80133 Unit(s) IV Push <User Schedule>  folic acid 1 milliGRAM(s) Oral daily  glucagon  Injectable 1 milliGRAM(s) IntraMuscular once  heparin   Injectable 5000 Unit(s) SubCutaneous every 12 hours  insulin lispro (ADMELOG) corrective regimen sliding scale   SubCutaneous three times a day before meals  insulin lispro (ADMELOG) corrective regimen sliding scale   SubCutaneous at bedtime  labetalol 200 milliGRAM(s) Oral two times a day  montelukast 10 milliGRAM(s) Oral daily  senna 2 Tablet(s) Oral at bedtime  vancomycin  IVPB 750 milliGRAM(s) IV Intermittent <User Schedule>    MEDICATIONS  (PRN):  albuterol    90 MICROgram(s) HFA Inhaler 2 Puff(s) Inhalation every 6 hours PRN for shortness of breath and/or wheezing  aluminum hydroxide/magnesium hydroxide/simethicone Suspension 30 milliLiter(s) Oral every 4 hours PRN Dyspepsia  dextrose Oral Gel 15 Gram(s) Oral once PRN Blood Glucose LESS THAN 70 milliGRAM(s)/deciliter  HYDROmorphone   Tablet 2 milliGRAM(s) Oral every 6 hours PRN Moderate Pain (4 - 6)  HYDROmorphone   Tablet 4 milliGRAM(s) Oral every 6 hours PRN Severe Pain (7 - 10)  HYDROmorphone  Injectable 0.5 milliGRAM(s) IV Push every 6 hours PRN breakthrough pain  melatonin 3 milliGRAM(s) Oral at bedtime PRN Insomnia  naloxone Injectable 1 milliGRAM(s) IV Push once PRN For Opioid OD  ondansetron Injectable 4 milliGRAM(s) IV Push every 8 hours PRN Nausea and/or Vomiting      I&O's Summary    03 Mar 2025 07:01  -  04 Mar 2025 07:00  --------------------------------------------------------  IN: 1300 mL / OUT: 1300 mL / NET: 0 mL        PHYSICAL EXAM:  Vital Signs Last 24 Hrs  T(C): 36.6 (04 Mar 2025 08:03), Max: 37 (03 Mar 2025 23:59)  T(F): 97.9 (04 Mar 2025 08:03), Max: 98.6 (03 Mar 2025 23:59)  HR: 74 (04 Mar 2025 08:03) (70 - 80)  BP: 157/76 (04 Mar 2025 08:03) (84/40 - 161/80)  BP(mean): --  RR: 17 (04 Mar 2025 08:03) (17 - 18)  SpO2: 100% (04 Mar 2025 08:03) (97% - 100%)    Parameters below as of 04 Mar 2025 08:03  Patient On (Oxygen Delivery Method): room air    CONSTITUTIONAL: NAD, well-groomed  RESPIRATORY: Normal respiratory effort; lungs are clear to auscultation bilaterally  CARDIOVASCULAR: Regular rate and rhythm, no LE edema  ABDOMEN: Nontender to palpation, normoactive bowel sounds  NEURO: Word finding difficulty. 2-3/5 of RLE (chronic per pt). No facial droop noted    LABS:                        9.2    5.35  )-----------( 331      ( 04 Mar 2025 05:47 )             30.3     03-04    134[L]  |  94[L]  |  34.8[H]  ----------------------------<  88  4.2   |  24.0  |  5.32[H]    Ca    9.0      04 Mar 2025 05:47            Urinalysis Basic - ( 04 Mar 2025 05:47 )    Color: x / Appearance: x / SG: x / pH: x  Gluc: 88 mg/dL / Ketone: x  / Bili: x / Urobili: x   Blood: x / Protein: x / Nitrite: x   Leuk Esterase: x / RBC: x / WBC x   Sq Epi: x / Non Sq Epi: x / Bacteria: x        CAPILLARY BLOOD GLUCOSE      POCT Blood Glucose.: 110 mg/dL (04 Mar 2025 11:37)  POCT Blood Glucose.: 72 mg/dL (04 Mar 2025 07:35)  POCT Blood Glucose.: 73 mg/dL (03 Mar 2025 22:14)  POCT Blood Glucose.: 73 mg/dL (03 Mar 2025 17:00)  POCT Blood Glucose.: 98 mg/dL (03 Mar 2025 11:59)      RADIOLOGY & ADDITIONAL TESTS:  Results Reviewed:   Imaging Personally Reviewed:  Electrocardiogram Personally Reviewed:

## 2025-03-04 NOTE — DISCHARGE NOTE NURSING/CASE MANAGEMENT/SOCIAL WORK - PATIENT PORTAL LINK FT
You can access the FollowMyHealth Patient Portal offered by [facility] by registering at the following website: [email or web address]. By joining [employer]’s FollowMyHealth portal, you will also be able to view your health information using other applications (apps) compatible with our system.

## 2025-03-26 LAB
CULTURE RESULTS: SIGNIFICANT CHANGE UP
SPECIMEN SOURCE: SIGNIFICANT CHANGE UP

## 2025-06-26 ENCOUNTER — APPOINTMENT (OUTPATIENT)
Age: 70
End: 2025-06-26
Payer: MEDICARE

## 2025-06-26 VITALS
HEIGHT: 67 IN | WEIGHT: 170 LBS | SYSTOLIC BLOOD PRESSURE: 88 MMHG | HEART RATE: 76 BPM | DIASTOLIC BLOOD PRESSURE: 59 MMHG | RESPIRATION RATE: 14 BRPM | BODY MASS INDEX: 26.68 KG/M2

## 2025-06-26 PROCEDURE — 99213 OFFICE O/P EST LOW 20 MIN: CPT

## 2025-07-16 ENCOUNTER — APPOINTMENT (OUTPATIENT)
Dept: ORTHOPEDIC SURGERY | Facility: CLINIC | Age: 70
End: 2025-07-16
Payer: MEDICARE

## 2025-07-16 VITALS
HEIGHT: 67 IN | SYSTOLIC BLOOD PRESSURE: 120 MMHG | WEIGHT: 170 LBS | DIASTOLIC BLOOD PRESSURE: 74 MMHG | BODY MASS INDEX: 26.68 KG/M2

## 2025-07-16 PROCEDURE — 99214 OFFICE O/P EST MOD 30 MIN: CPT

## 2025-07-16 PROCEDURE — G2211 COMPLEX E/M VISIT ADD ON: CPT
